# Patient Record
Sex: FEMALE | Race: BLACK OR AFRICAN AMERICAN | NOT HISPANIC OR LATINO | Employment: OTHER | URBAN - METROPOLITAN AREA
[De-identification: names, ages, dates, MRNs, and addresses within clinical notes are randomized per-mention and may not be internally consistent; named-entity substitution may affect disease eponyms.]

---

## 2017-08-07 ENCOUNTER — OFFICE VISIT (OUTPATIENT)
Dept: OCCUPATIONAL MEDICINE | Facility: CLINIC | Age: 57
End: 2017-08-07
Payer: OTHER MISCELLANEOUS

## 2017-08-07 VITALS
WEIGHT: 145 LBS | OXYGEN SATURATION: 97 % | BODY MASS INDEX: 24.75 KG/M2 | RESPIRATION RATE: 16 BRPM | HEART RATE: 94 BPM | HEIGHT: 64 IN | DIASTOLIC BLOOD PRESSURE: 75 MMHG | TEMPERATURE: 99 F | SYSTOLIC BLOOD PRESSURE: 107 MMHG

## 2017-08-07 DIAGNOSIS — S63.501A SPRAIN OF RIGHT WRIST, INITIAL ENCOUNTER: ICD-10-CM

## 2017-08-07 DIAGNOSIS — S43.401A SPRAIN OF RIGHT SHOULDER, UNSPECIFIED SHOULDER SPRAIN TYPE, INITIAL ENCOUNTER: ICD-10-CM
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
## 2017-08-07 PROCEDURE — 99203 OFFICE O/P NEW LOW 30 MIN: CPT | Mod: S$GLB,,, | Performed by: NURSE PRACTITIONER

## 2017-08-07 PROCEDURE — 3008F BODY MASS INDEX DOCD: CPT | Mod: S$GLB,,, | Performed by: NURSE PRACTITIONER

## 2017-08-07 RX ORDER — LISINOPRIL 10 MG/1
10 TABLET ORAL DAILY
COMMUNITY
End: 2020-09-30

## 2017-08-07 RX ORDER — TRAMADOL HYDROCHLORIDE 100 MG/1
100 TABLET, EXTENDED RELEASE ORAL DAILY
COMMUNITY
End: 2020-09-30
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
## 2018-12-12 DIAGNOSIS — M19.012 OSTEOARTHRITIS OF LEFT SHOULDER: Primary | ICD-10-CM

## 2019-01-02 ENCOUNTER — CLINICAL SUPPORT (OUTPATIENT)
Dept: REHABILITATION | Facility: OTHER | Age: 59
End: 2019-01-02
Payer: MEDICAID

## 2019-01-02 DIAGNOSIS — G89.29 CHRONIC LEFT SHOULDER PAIN: ICD-10-CM

## 2019-01-02 DIAGNOSIS — M25.512 CHRONIC LEFT SHOULDER PAIN: ICD-10-CM

## 2019-01-02 DIAGNOSIS — R53.1 DECREASED STRENGTH: ICD-10-CM

## 2019-01-02 DIAGNOSIS — M25.612 DECREASED RANGE OF MOTION OF LEFT SHOULDER: ICD-10-CM

## 2019-01-02 PROCEDURE — 97161 PT EVAL LOW COMPLEX 20 MIN: CPT | Mod: PN

## 2019-01-02 NOTE — PLAN OF CARE
[facility]  Physical Therapy Initial Evaluation    Name: Brandy Alegria  Clinic Number: [id number]    Therapy Diagnosis:   Encounter Diagnoses   Name Primary?    Chronic left shoulder pain     Decreased range of motion of left shoulder     Decreased strength      Physician: Haven Estrada, NP    Physician Orders: PT Eval and Treat   Medical Diagnosis from Referral: primary OA - left shoulder  Evaluation Date: 2019  Authorization Period Expiration: 2019  Plan of Care Expiration: 2019  Visit # / Visits authorized:     Time In: 10:00  Time Out: 11:00  Total Billable Time: 60 minutes    Precautions: Standard, smoker, Hx neck/back pain    Subjective   Date of onset:   History of current condition - BRANDY reports: Insidious onset of L shoulder pain since  without HUMBERTO or trauma at onset. Pt notes gradual worsening with severe pain and difficulty with raising arm, extending, reaching behind the back. Pt was told by the MD that it was arthritis. Pt reports that she must keep the arm close to her side otherwise doing too much or reaching too far will make it pop and hurt.    Finished PT for neck and back 1-2 months ago.  R handed. Kids live with her to help as needed. Needs occasional assistance with HHCs and ADLs, including dressing.      Medical History:   Past Medical History:   Diagnosis Date    Hypertension        Surgical History:   Brandy Alegria  has a past surgical history that includes  section.    Medications:   Brandy TURK has a current medication list which includes the following prescription(s): lisinopril and tramadol.    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[employer])  Occupation: not working  Prior Level of Function: independent with all ADLs,  "HHCs  Current Level of Function: occasionally requires assistance with ADLs, such as dressing, and is UA to perform regular ADLs/HHCs due to increased pain    Pain:  Current 9/10, worst 10/10, best 9/10   Location: left shoulder   Description: Variable - deep inside, dull ache - occasional sharp pain up to neck and down LUE  Aggravating Factors: sleeping on L side, using L arm to reach in all directions  Easing Factors: muscle relaxers, tylenol - change position and keep the arm at the side, heating pad, warm bath    Pts goals: "I don't really want to be here at therapy, but I just want something to help with my pain."    Objective     Posture Alignment: slouched posture with rounded shoulders, poor endurance and requires occasional trunk SB to R with prop up on RUE  DTR's: 1+  Dermatomes: Sensation: Light Touch: Intact    Cervical AROM: WFL all directions, no pain. Max Limited = flex (chin 2" from chest)    Shoulder  Right   Left  Pain/Dysfunction with Movement    AROM PROM MMT AROM PROM MMT    flexion 150 165 3+ 90* 100* 3- *indicates Guarding/pain limits A/PROM. Empty EF with PROM 2* pain     extension 55 NT NT 35 36* NT    abduction 160 170 3+ 60* 70* 3-    Internal rotation T7 NT 4 Mid-glute* 40* 4    ER at 0° abd 76 NT 4 45* 50* 4      Scapular Strength: grossly 3-/5  Elbow ROM: WNL nneka  Elbow Strength: Right Left  -Flexion  4+ 4  -Extension  4+ 4    Flexibility:   - Pectoralis Major/Minor: poor  - Latissimus Dorsi: NT 2* time constraints, assess prn  -Subscapularis: NT 2* time constraints, assess prn    Joint Mobility: 2-/6 inf/AP glides  Special Tests: UA to accurately assess 2* pain/guarding        CMS Impairment/Limitation/Restriction for FOTO Shoulder Survey    Therapist reviewed FOTO scores for Aileen Alegria on 1/2/2019.   FOTO documents entered into [employer] - see Media section.    Limitation Score: 53%  Category: Mobility    Current : CK = at least 40% but < 60% impaired, limited or restricted  Goal: CJ = " at least 20% but < 40% impaired, limited or restricted  Discharge: N/A         TREATMENT   Treatment Time In: 10:30  Treatment Time Out: 11:00  Total Treatment time separate from Evaluation: 30 minutes    BRANDY received therapeutic exercises to develop strength, endurance, ROM, flexibility, posture and core stabilization for 15 minutes including:  Table flexion slides 15x  Table scaption slides 15x  Pendulum hang 2 min x 2#  Pendulums M/L 10x 2#    BRANDY received the following manual therapy techniques: Joint mobilizations and Manual traction were applied to the: L shoulder for 8 minutes    Home Exercises and Patient Education Provided    Education provided:   - - Patient educated regarding surgical procedure, pathogenesis, diagnosis, protocol, prognosis, POC, and HEP. Written Home Exercises Provided with written and verbal instructions for frequency and duration of the following exercises: see media tab in EMR for written directions. Pt educated on HEP and activity modifications to reduce c/o pain and improve overall function.     - Pt was educated in posture and body mechanics.  Encouraged pt to use HEP several times throughout the day to reduce and prevent symptom occurrence or from worsening pain as well as to improve functional mobiltiy and ease with tasks    - Pt also educated on use of modalities prn to reduce c/o pain and dysfunction.     - Pt educated on clinic's cancellation/no-show policy of missing 3 consecutive PT appointments, which will result in an automatic discharge from therapy services 2* to non-compliance, unless otherwise stated.     - Patient demo good understanding of the education provided. Patient demo good return demo of skill of exercises.        Written Home Exercises Provided: yes.  Exercises were reviewed and BRANDY was able to demonstrate them prior to the end of the session.  BRANDY demonstrated good  understanding of the education provided.     See EMR under Media for exercises  provided 1/2/2019.    Assessment   Aileen TURK is a 58 y.o. female referred to outpatient Physical Therapy with a medical diagnosis of primary OA of the left shoulder. Pt presents with marked limitations in joint mobility, active and passive ROM due to pain and guarding, as well as strength, which limits pt with all ADLs. Pt also presents with poor postural awareness and endurance, which continues to facilitate malalignment of the GHJ and facilitate pain and difficulty with functional mobility.    Pt prognosis is Good.   Pt will benefit from skilled outpatient Physical Therapy to address the deficits stated above and in the chart below, provide pt/family education, and to maximize pt's level of independence.     Plan of care discussed with patient: Yes  Pt's spiritual, cultural and educational needs considered and patient is agreeable to the plan of care and goals as stated below:     Anticipated Barriers for therapy: requires transportation, dwelling location ([facility]), financial considerations    Medical Necessity is demonstrated by the following  History  Co-morbidities and personal factors that may impact the plan of care Co-morbidities:   coping style/mechanism, difficulty sleeping, education level, excessive commute time/distance, financial considerations, level of undertstanding of current condition and transportation assistance required    Personal Factors:   education level  coping style  social background  lifestyle  character     moderate   Examination  Body Structures and Functions, activity limitations and participation restrictions that may impact the plan of care Body Regions:   back  upper extremities  trunk    Body Systems:    gross symmetry  ROM  strength  gross coordinated movement  motor control  motor learning  joint mobility, posture    Participation Restrictions:   ADLs, HHCs, dressing/grooming, cooking, sleeping    Activity limitations:   Learning and applying knowledge  no  deficits    General Tasks and Commands  no deficits    Communication  no deficits    Mobility  lifting and carrying objects    Self care  washing oneself (bathing, drying, washing hands)  caring for body parts (brushing teeth, shaving, grooming)  toileting  dressing  eating  drinking  looking after one's health    Domestic Life  shopping  cooking  doing house work (cleaning house, washing dishes, laundry)  assisting others    Interactions/Relationships  no deficits    Life Areas  employment    Community and Social Life  community life  recreation and leisure  [employer] and spirituality         moderate   Clinical Presentation stable and uncomplicated low   Decision Making/ Complexity Score: low     Goals:  Short Term Goals (4 Weeks):   1. Pt to demonstrate improved PROM by 10% to allow pt to perform self care activities with increased ease.  2. Pt to demonstrate improved flexibility by a half grade to allow improved ADLs with increased ease.   3. Pt will report <7/10 pain within the left shoulder for ease with donning/doffing sling.  4. Pt will report being independent with her HEP for maintenance of improvements gained during therapy sessions  5. Pt to demonstrate improved functional ability with FOTO limitation <=47% disability.    Long Term Goals (20 Weeks):   1. Pt to demonstrate improved ROM to WNL, R=L, to allow pt to perform self care with increased ease.  2. Pt to demonstrate improved flexibility by a full grade to allow improved postural alignment with increased ease.  3. Pt will demonstrate >=4+/5 strength within the left shoulder for ease with house hold chores  4. Pt to demonstrate improved functional ability with FOTO limitation <=40% disability.  5. Pt independent with HEP and demonstrates good return technique.      Plan   Plan of care Certification: 1/2/2019 to 04/02/2019.    Outpatient Physical Therapy 2 times weekly for 8 weeks to include the following interventions: Aquatic Therapy, Electrical  Stimulation prn, Iontophoresis (with dexamethasone prn), Manual Therapy, Moist Heat/ Ice, Neuromuscular Re-ed, Patient Education, Self Care, Therapeutic Activites, Therapeutic Exercise and IASTYM, therapeutic taping, dry needling. Progress HEP towards D/C. Recommend F/U with MD if symptoms worsen or do not resolve. Patient may be seen by a PTA for treatment to carry out their plan of care.  Face-to-face conferences will be held.        Hailey Zacarias, PT

## 2019-02-01 ENCOUNTER — CLINICAL SUPPORT (OUTPATIENT)
Dept: REHABILITATION | Facility: HOSPITAL | Age: 59
End: 2019-02-01
Payer: MEDICAID

## 2019-02-01 DIAGNOSIS — G89.29 CHRONIC LEFT SHOULDER PAIN: ICD-10-CM

## 2019-02-01 DIAGNOSIS — R53.1 DECREASED STRENGTH: ICD-10-CM

## 2019-02-01 DIAGNOSIS — M25.612 DECREASED RANGE OF MOTION OF LEFT SHOULDER: ICD-10-CM

## 2019-02-01 DIAGNOSIS — M25.512 CHRONIC LEFT SHOULDER PAIN: ICD-10-CM

## 2019-02-01 PROCEDURE — 97140 MANUAL THERAPY 1/> REGIONS: CPT | Mod: PO

## 2019-02-01 PROCEDURE — 97110 THERAPEUTIC EXERCISES: CPT | Mod: PO

## 2019-02-01 NOTE — PROGRESS NOTES
"  Physical Therapy Daily Treatment Note     Name: Aileen Alegria  Clinic Number: [id number]    Therapy Diagnosis:   Encounter Diagnoses   Name Primary?    Chronic left shoulder pain     Decreased range of motion of left shoulder     Decreased strength      Physician: Haven Estrada NP    Visit Date: 2/1/2019  Physician Orders: PT Eval and Treat   Medical Diagnosis from Referral: primary OA - left shoulder  Evaluation Date: 1/2/2019  Authorization Period Expiration: 02/28/2019  Plan of Care Expiration: 04/02/2019  Visit # / Visits authorized: 2/ 20    Time In: 2:10  Time Out:3:05  Total Billable Time: 40 minutes    Precautions: Standard, smoker, Hx neck/back pain    Subjective     Pt reports: her L shoulder pain is the same since she was last seen in PT 4 weeks ago.  States as long as she keeps her arm at her side, she doesn't have pain. But once she reaches forward or backward, the pain gets severe. C/o difficulty sleeping due to L shoulder pain.  Pain anterior and posteriorly.    Objective     MADELINE received therapeutic exercises to develop strength, endurance, ROM, flexibility and posture for 26 minutes including:    scaption ball rolls x 15  scap squeeze on 1/2 roll 5" hold x 2 minutes  Pulleys flexion/scaption 1 min each  Supine cane flexion 1 x 10  Serratus punches 2 x 10  SL cap squeeze x 2 minutes    MADELINE received the following manual therapy techniques: Joint mobilizations were applied to the: L shoulder for 8 minutes, including:    Gentle PROM all planes L shoulder  Grade II inferior/posterior mobs L GH joint    PROM L shoulder:  Flexion 90 deg  Abduction 90 deg  ER 30 deg  IR 60 deg    Reviewed HEP issued previously, pt states she does them "sometimes". PT educated pt on importance of daily stretching use of ice    Assessment     Patient previously evaluated at different location on 1/2/2019, returns to PT this date as transfer patient.  She presents with moderate functional limitations secondary to " L shoulder pain, weakness, and limited AROM/PROM. Pt demo moderate mm guarding during PROM with PT.  She tolerates AAROM and low level scapular recruitment well this date, denies increased pain throughout session. Pt will benefit from progressive therex initially focused on restoring normal mobility and decreasing pain/inflammation with progression of AAROM, AROM, and RC and periscapular strengthening as tolerated.        MADELINE is progressing well towards her goals.   Pt prognosis is Good.     Pt will continue to benefit from skilled outpatient physical therapy to address the deficits listed in the problem list box on initial evaluation, provide pt/family education and to maximize pt's level of independence in the home and community environment.     Pt's spiritual, cultural and educational needs considered and pt agreeable to plan of care and goals.    Anticipated barriers to physical therapy:     Goals:   Short Term Goals (4 Weeks):   1. Pt to demonstrate improved PROM by 10% to allow pt to perform self care activities with increased ease.  2. Pt to demonstrate improved flexibility by a half grade to allow improved ADLs with increased ease.   3. Pt will report <7/10 pain within the left shoulder for ease with donning/doffing sling.  4. Pt will report being independent with her HEP for maintenance of improvements gained during therapy sessions  5. Pt to demonstrate improved functional ability with FOTO limitation <=47% disability.     Long Term Goals (20 Weeks):   1. Pt to demonstrate improved ROM to WNL, R=L, to allow pt to perform self care with increased ease.  2. Pt to demonstrate improved flexibility by a full grade to allow improved postural alignment with increased ease.  3. Pt will demonstrate >=4+/5 strength within the left shoulder for ease with house hold chores  4. Pt to demonstrate improved functional ability with FOTO limitation <=40% disability.  5. Pt independent with HEP and demonstrates good  return technique    Plan     Plan of care Certification: 1/2/2019 to 04/02/2019.     Outpatient Physical Therapy 2 times weekly for 8 weeks to include the following interventions: Aquatic Therapy, Electrical Stimulation prn, Iontophoresis (with dexamethasone prn), Manual Therapy, Moist Heat/ Ice, Neuromuscular Re-ed, Patient Education, Self Care, Therapeutic Activites, Therapeutic Exercise, therapeutic taping, dry needling. Progress HEP towards D/C. Recommend F/U with MD if symptoms worsen or do not resolve    Liliane Ramirez, PT

## 2019-02-05 ENCOUNTER — CLINICAL SUPPORT (OUTPATIENT)
Dept: REHABILITATION | Facility: HOSPITAL | Age: 59
End: 2019-02-05
Payer: MEDICAID

## 2019-02-05 DIAGNOSIS — M25.512 CHRONIC LEFT SHOULDER PAIN: ICD-10-CM

## 2019-02-05 DIAGNOSIS — R53.1 DECREASED STRENGTH: ICD-10-CM

## 2019-02-05 DIAGNOSIS — G89.29 CHRONIC LEFT SHOULDER PAIN: ICD-10-CM

## 2019-02-05 DIAGNOSIS — M25.612 DECREASED RANGE OF MOTION OF LEFT SHOULDER: ICD-10-CM

## 2019-02-05 PROCEDURE — 97110 THERAPEUTIC EXERCISES: CPT | Mod: PO

## 2019-02-05 PROCEDURE — 97140 MANUAL THERAPY 1/> REGIONS: CPT | Mod: PO

## 2019-02-05 NOTE — PROGRESS NOTES
"  Physical Therapy Daily Treatment Note     Name: Aileen Alegria  Clinic Number: [id number]    Therapy Diagnosis:   Encounter Diagnoses   Name Primary?    Chronic left shoulder pain     Decreased range of motion of left shoulder     Decreased strength      Physician: Haven Estrada NP    Visit Date: 2/5/2019  Physician Orders: PT Eval and Treat   Medical Diagnosis from Referral: primary OA - left shoulder  Evaluation Date: 1/2/2019  Authorization Period Expiration: 02/28/2019  Plan of Care Expiration: 04/02/2019  Visit # / Visits authorized: 3/ 20    Time In: 10:02  Time Out:11:00  Total Billable Time: 40 minutes    Precautions: Standard, smoker, Hx neck/back pain    Subjective     Pt reports: her L arm felt better after last session, but overall "It's horrible".    Objective     MADELINE received therapeutic exercises to develop strength, endurance, ROM, flexibility and posture for 26 minutes including:    Standing ball rolls flexion/scaption 2 min each  scap squeeze on 1/2 roll 5" hold x 2 minutes  Pulleys flexion/scaption 2 min each  Supine cane flexion 2 x 10  Serratus punches 3 x 10  SL ER 3 x 10  Supine cane ER 5 x 30"    MADELINE received the following manual therapy techniques: Joint mobilizations were applied to the: L shoulder for 8 minutes, including:    Gentle PROM all planes L shoulder  Grade II inferior/posterior mobs L GH joint    Reviewed current HEP and educated pt on importance of stretching to end ranges daily    Assessment     Patient reporting poor compliance with HEP, stating she's only been doing the pendulums.  Moderate limitations in capsular mobility which is preventing normal shoulder mechanics.    MADELINE is progressing well towards her goals.   Pt prognosis is Good.     Pt will continue to benefit from skilled outpatient physical therapy to address the deficits listed in the problem list box on initial evaluation, provide pt/family education and to maximize pt's level of independence in " the home and community environment.     Pt's spiritual, cultural and educational needs considered and pt agreeable to plan of care and goals.    Anticipated barriers to physical therapy:     Goals:   Short Term Goals (4 Weeks):   1. Pt to demonstrate improved PROM by 10% to allow pt to perform self care activities with increased ease.  2. Pt to demonstrate improved flexibility by a half grade to allow improved ADLs with increased ease.   3. Pt will report <7/10 pain within the left shoulder for ease with donning/doffing sling.  4. Pt will report being independent with her HEP for maintenance of improvements gained during therapy sessions  5. Pt to demonstrate improved functional ability with FOTO limitation <=47% disability.     Long Term Goals (20 Weeks):   1. Pt to demonstrate improved ROM to WNL, R=L, to allow pt to perform self care with increased ease.  2. Pt to demonstrate improved flexibility by a full grade to allow improved postural alignment with increased ease.  3. Pt will demonstrate >=4+/5 strength within the left shoulder for ease with house hold chores  4. Pt to demonstrate improved functional ability with FOTO limitation <=40% disability.  5. Pt independent with HEP and demonstrates good return technique    Plan     Plan of care Certification: 1/2/2019 to 04/02/2019.     Outpatient Physical Therapy 2 times weekly for 8 weeks to include the following interventions: Aquatic Therapy, Electrical Stimulation prn, Iontophoresis (with dexamethasone prn), Manual Therapy, Moist Heat/ Ice, Neuromuscular Re-ed, Patient Education, Self Care, Therapeutic Activites, Therapeutic Exercise, therapeutic taping, dry needling. Progress HEP towards D/C. Recommend F/U with MD if symptoms worsen or do not resolve    Liliane Ramirez, PT

## 2019-02-07 ENCOUNTER — CLINICAL SUPPORT (OUTPATIENT)
Dept: REHABILITATION | Facility: HOSPITAL | Age: 59
End: 2019-02-07
Payer: MEDICAID

## 2019-02-07 DIAGNOSIS — G89.29 CHRONIC LEFT SHOULDER PAIN: ICD-10-CM

## 2019-02-07 DIAGNOSIS — M25.512 CHRONIC LEFT SHOULDER PAIN: ICD-10-CM

## 2019-02-07 DIAGNOSIS — R53.1 DECREASED STRENGTH: ICD-10-CM

## 2019-02-07 DIAGNOSIS — M25.612 DECREASED RANGE OF MOTION OF LEFT SHOULDER: ICD-10-CM

## 2019-02-07 PROCEDURE — 97110 THERAPEUTIC EXERCISES: CPT | Mod: PO

## 2019-02-07 PROCEDURE — 97140 MANUAL THERAPY 1/> REGIONS: CPT | Mod: PO

## 2019-02-07 NOTE — PROGRESS NOTES
"  Physical Therapy Daily Treatment Note     Name: Aileen Alegria  Clinic Number: [id number]    Therapy Diagnosis:   Encounter Diagnoses   Name Primary?    Chronic left shoulder pain     Decreased range of motion of left shoulder     Decreased strength      Physician: Haven Estrada NP    Visit Date: 2/7/2019  Physician Orders: PT Eval and Treat   Medical Diagnosis from Referral: primary OA - left shoulder  Evaluation Date: 1/2/2019  Authorization Period Expiration: 02/28/2019  Plan of Care Expiration: 04/02/2019  Visit # / Visits authorized: 4/ 20    Time In: 10:02  Time Out:11:00  Total Billable Time: 50 minutes    Precautions: Standard, smoker, Hx neck/back pain    Subjective     Pt reports: having 9/10 pain in her arm all day yesterday, stating she holds her arm for relief.  Patient states overall the pain in her arm in the past week has stayed the same, but states some days the pain feels better, usually after therapy. Continues to have pain with sleep, and cannot tolerate L SL    Objective     AROM scaption upon arrival 110 deg.      MADELINE received therapeutic exercises to develop strength, endurance, ROM, flexibility and posture for 26 minutes including:    Standing ball rolls flexion/scaption 2 min each  scap squeeze on 1/2 roll 5" hold x 2 minutes  Pulleys flexion/scaption 2 min each  Supine cane flexion 2 x 10  Serratus punches 3 x 10  SL ER 3 x 10  Supine cane ER 5 x 30"  Supine pec stretch on 1/2 roll 2m x 2  Prone row 2 x 10  Prone extension 3 x 10    MADELINE received the following manual therapy techniques: Joint mobilizations were applied to the: L shoulder for 8 minutes, including:    Gentle PROM all planes L shoulder  Grade II inferior/posterior mobs L GH joint  Passive pec/biceps stretch    Assessment     Shoulder mobility improving, yet pt denies decrease in pain levels. Tolerates progression of scapular strengthening well this date.    MADELINE is progressing well towards her goals.   Pt " prognosis is Good.     Pt will continue to benefit from skilled outpatient physical therapy to address the deficits listed in the problem list box on initial evaluation, provide pt/family education and to maximize pt's level of independence in the home and community environment.     Pt's spiritual, cultural and educational needs considered and pt agreeable to plan of care and goals.    Anticipated barriers to physical therapy:     Goals:   Short Term Goals (4 Weeks):   1. Pt to demonstrate improved PROM by 10% to allow pt to perform self care activities with increased ease.  2. Pt to demonstrate improved flexibility by a half grade to allow improved ADLs with increased ease.   3. Pt will report <7/10 pain within the left shoulder for ease with donning/doffing sling.  4. Pt will report being independent with her HEP for maintenance of improvements gained during therapy sessions  5. Pt to demonstrate improved functional ability with FOTO limitation <=47% disability.     Long Term Goals (20 Weeks):   1. Pt to demonstrate improved ROM to WNL, R=L, to allow pt to perform self care with increased ease.  2. Pt to demonstrate improved flexibility by a full grade to allow improved postural alignment with increased ease.  3. Pt will demonstrate >=4+/5 strength within the left shoulder for ease with house hold chores  4. Pt to demonstrate improved functional ability with FOTO limitation <=40% disability.  5. Pt independent with HEP and demonstrates good return technique    Plan     Plan of care Certification: 1/2/2019 to 04/02/2019.     Outpatient Physical Therapy 2 times weekly for 8 weeks to include the following interventions: Aquatic Therapy, Electrical Stimulation prn, Iontophoresis (with dexamethasone prn), Manual Therapy, Moist Heat/ Ice, Neuromuscular Re-ed, Patient Education, Self Care, Therapeutic Activites, Therapeutic Exercise, therapeutic taping, dry needling. Progress HEP towards D/C. Recommend F/U with MD if  symptoms worsen or do not resolve    Liliane Ramirez, PT

## 2019-02-18 ENCOUNTER — CLINICAL SUPPORT (OUTPATIENT)
Dept: REHABILITATION | Facility: HOSPITAL | Age: 59
End: 2019-02-18
Payer: MEDICAID

## 2019-02-18 DIAGNOSIS — M25.512 CHRONIC LEFT SHOULDER PAIN: ICD-10-CM

## 2019-02-18 DIAGNOSIS — R53.1 DECREASED STRENGTH: ICD-10-CM

## 2019-02-18 DIAGNOSIS — G89.29 CHRONIC LEFT SHOULDER PAIN: ICD-10-CM

## 2019-02-18 DIAGNOSIS — M25.612 DECREASED RANGE OF MOTION OF LEFT SHOULDER: ICD-10-CM

## 2019-02-18 PROCEDURE — 97110 THERAPEUTIC EXERCISES: CPT | Mod: PO

## 2019-02-18 NOTE — PROGRESS NOTES
"  Physical Therapy Daily Treatment Note     Name: Aileen Alegria  Clinic Number: [id number]    Therapy Diagnosis:   Encounter Diagnoses   Name Primary?    Chronic left shoulder pain     Decreased range of motion of left shoulder     Decreased strength      Physician: Haven Estrada NP    Visit Date: 2/18/2019  Physician Orders: PT Eval and Treat   Medical Diagnosis from Referral: primary OA - left shoulder  Evaluation Date: 1/2/2019  Authorization Period Expiration: 02/28/2019  Plan of Care Expiration: 04/02/2019  Visit # / Visits authorized: 5/ 20    Time In: 8:33  Time Out:9:15  Total Billable Time: 30 minutes    Precautions: Standard, smoker, Hx neck/back pain    Subjective     Pt reports: increased L shoulder pain in the last two weeks, states the pain will now refer across to her R shoulder.  No improvements noted in functional use.  Current pain report: 11/10. States when she tries to reach overhead or pushes through her L arm, she feels stabbing pain "deep in my shoulder".  Pt states she is planning to contact MD to make appt regarding progressively increasing pain.   Objective     AROM L shoulder:  Flexion: 97 deg  Abduction: 90 deg  IR thumb to sacrum (R thumb to T12)  Extension: 35 deg  **reported increased pain at all ranges    PROM:  Flexion 120 deg  Abduction 100 deg  ER 35 deg (at 70 deg abd)  IR 35 deg (at 70 deg abd)  **c/o pain at all end ranges    Palpation: moderate tenderness reported at L UT  Special testing: unable to perform due to mm guarding      MADELINE received therapeutic exercises to develop strength, endurance, ROM, flexibility and posture for 26 minutes including:    Standing ball rolls flexion/scaption 2 min each  scap squeeze on 1/2 roll 5" hold x 2 minutes  Pulleys flexion/scaption 2 min each  Supine cane flexion 2 x 10  Serratus punches 3 x 10  SL ER 3 x 10-NP  Supine cane ER 5 x 30"-NP  Supine pec stretch on towel roll 2m x 2  Prone row 3 x 10  Prone extension 3 x 10    MADELINE " received the following manual therapy techniques: Joint mobilizations were applied to the: L shoulder for 8 minutes, including:    Gentle PROM all planes L shoulder  Grade II inferior/posterior mobs L GH joint  Passive pec/biceps stretch    Assessment     Patient has attended 5 sessions of physical therapy for treatment of L shoulder pain, limited mobility, and weakness . Patient is currently making very minimal progress regarding strength and mobility and is reporting progressively worsening L UE pain.  Difficult to perform special testing due to mm guarding, however signs and symptoms are consistent with rotator cuff and possible labral involvement and pt would benefit from further diagnostic testing at this time.  Patient will benefit from continued skilled PT to progress further towards long term goals, however if no significant improvement noted in the next 2 weeks, will plan to discharge and refer patient back to MD.    MADELINE is progressing well towards her goals.   Pt prognosis is Good.     Pt will continue to benefit from skilled outpatient physical therapy to address the deficits listed in the problem list box on initial evaluation, provide pt/family education and to maximize pt's level of independence in the home and community environment.     Pt's spiritual, cultural and educational needs considered and pt agreeable to plan of care and goals.    Anticipated barriers to physical therapy:     Goals:   Short Term Goals (4 Weeks):   1. Pt to demonstrate improved PROM by 10% to allow pt to perform self care activities with increased ease.  2. Pt to demonstrate improved flexibility by a half grade to allow improved ADLs with increased ease.   3. Pt will report <7/10 pain within the left shoulder for ease with donning/doffing sling.  4. Pt will report being independent with her HEP for maintenance of improvements gained during therapy sessions  5. Pt to demonstrate improved functional ability with FOTO  limitation <=47% disability.     Long Term Goals (20 Weeks):   1. Pt to demonstrate improved ROM to WNL, R=L, to allow pt to perform self care with increased ease.  2. Pt to demonstrate improved flexibility by a full grade to allow improved postural alignment with increased ease.  3. Pt will demonstrate >=4+/5 strength within the left shoulder for ease with house hold chores  4. Pt to demonstrate improved functional ability with FOTO limitation <=40% disability.  5. Pt independent with HEP and demonstrates good return technique    Plan     Plan of care Certification: 1/2/2019 to 04/02/2019.     Outpatient Physical Therapy 2 times weekly for 8 weeks to include the following interventions: Aquatic Therapy, Electrical Stimulation prn, Iontophoresis (with dexamethasone prn), Manual Therapy, Moist Heat/ Ice, Neuromuscular Re-ed, Patient Education, Self Care, Therapeutic Activites, Therapeutic Exercise, therapeutic taping, dry needling. Progress HEP towards D/C. Recommend F/U with MD if symptoms worsen or do not resolve.   Patient to call referring to make appt due to progressively worsening pain.    Liliane Ramirez, PT

## 2019-02-20 ENCOUNTER — CLINICAL SUPPORT (OUTPATIENT)
Dept: REHABILITATION | Facility: HOSPITAL | Age: 59
End: 2019-02-20
Payer: MEDICAID

## 2019-02-20 DIAGNOSIS — M25.512 CHRONIC LEFT SHOULDER PAIN: ICD-10-CM

## 2019-02-20 DIAGNOSIS — M25.612 DECREASED RANGE OF MOTION OF LEFT SHOULDER: ICD-10-CM

## 2019-02-20 DIAGNOSIS — G89.29 CHRONIC LEFT SHOULDER PAIN: ICD-10-CM

## 2019-02-20 DIAGNOSIS — R53.1 DECREASED STRENGTH: ICD-10-CM

## 2019-02-20 PROCEDURE — 97110 THERAPEUTIC EXERCISES: CPT | Mod: PO

## 2019-02-20 PROCEDURE — 97140 MANUAL THERAPY 1/> REGIONS: CPT | Mod: PO

## 2019-02-20 NOTE — PROGRESS NOTES
"  Physical Therapy Daily Treatment Note     Name: Aileen Alegria  Clinic Number: [id number]    Therapy Diagnosis:   Encounter Diagnoses   Name Primary?    Chronic left shoulder pain     Decreased range of motion of left shoulder     Decreased strength      Physician: Haven Estrada NP    Visit Date: 2/20/2019  Physician Orders: PT Eval and Treat   Medical Diagnosis from Referral: primary OA - left shoulder  Evaluation Date: 1/2/2019  Authorization Period Expiration: 02/28/2019  Plan of Care Expiration: 04/02/2019  Visit # / Visits authorized: 6/ 20    Time In: 8:30  Time Out:9:20  Total Billable Time: 50 minutes    Precautions: Standard, smoker, Hx neck/back pain    Subjective     Pt reports: increased L shoulder pain in the last two weeks, states the pain will now refer across to her R shoulder.  No improvements noted in functional use.  Current pain report: 11/10. States when she tries to reach overhead or pushes through her L arm, she feels stabbing pain "deep in my shoulder".  Patient to f/u with referring MD this date.   Objective     AROM L shoulder:  Flexion: 97 deg  Abduction: 90 deg  IR thumb to sacrum (R thumb to T12)  Extension: 35 deg  **reported increased pain at all ranges    PROM:  Flexion 120 deg  Abduction 100 deg  ER 35 deg (at 70 deg abd)  IR 35 deg (at 70 deg abd)  **c/o pain at all end ranges    Palpation: moderate tenderness reported at L UT  Special testing: unable to perform due to mm guarding      MADELINE received therapeutic exercises to develop strength, endurance, ROM, flexibility and posture for 42 minutes including:    Standing ball rolls flexion/scaption 2 min each  scap squeeze on 1/2 roll 5" hold x 2 minutes  Pulleys flexion/scaption 2 min each  Supine cane flexion 2 x 10  Serratus punches 3 x 10  SL ER 3 x 10  Supine cane ER 5 x 30"-NP  Supine pec stretch on towel roll 2m x 2  Prone row 3 x 10  Prone extension 3 x 10    MADELINE received the following manual therapy techniques: " Joint mobilizations were applied to the: L shoulder for 8 minutes, including:    Gentle PROM all planes L shoulder  Grade II inferior/posterior mobs L GH joint  Passive pec/biceps stretch    Assessment     Patient has attended 6 sessions of physical therapy for treatment of L shoulder pain, limited mobility, and weakness . Patient is currently making very minimal progress regarding strength and mobility and is reporting progressively worsening L UE pain.  Difficult to perform special testing due to mm guarding, however signs and symptoms are consistent with rotator cuff and possible labral involvement and pt would benefit from further diagnostic testing at this time.      MADELINE is progressing well towards her goals.   Pt prognosis is Good.     Pt will continue to benefit from skilled outpatient physical therapy to address the deficits listed in the problem list box on initial evaluation, provide pt/family education and to maximize pt's level of independence in the home and community environment.     Pt's spiritual, cultural and educational needs considered and pt agreeable to plan of care and goals.    Anticipated barriers to physical therapy:     Goals:   Short Term Goals (4 Weeks):   1. Pt to demonstrate improved PROM by 10% to allow pt to perform self care activities with increased ease. MET  2. Pt to demonstrate improved flexibility by a half grade to allow improved ADLs with increased ease.  IN PROGRESS  3. Pt will report <7/10 pain within the left shoulder IN PROGRESS  4. Pt will report being independent with her HEP for maintenance of improvements gained during therapy sessions MET  5. Pt to demonstrate improved functional ability with FOTO limitation <=47% disability.     Long Term Goals (16 Weeks):   1. Pt to demonstrate improved ROM to WNL, R=L, to allow pt to perform self care with increased ease.  2. Pt to demonstrate improved flexibility by a full grade to allow improved postural alignment with  increased ease.  3. Pt will demonstrate >=4+/5 strength within the left shoulder for ease with house hold chores  4. Pt to demonstrate improved functional ability with FOTO limitation <=40% disability.  5. Pt independent with HEP and demonstrates good return technique    Plan     Plan of care Certification: 1/2/2019 to 04/02/2019.     Outpatient Physical Therapy 2 times weekly for 8 weeks to include the following interventions: Aquatic Therapy, Electrical Stimulation prn, Iontophoresis (with dexamethasone prn), Manual Therapy, Moist Heat/ Ice, Neuromuscular Re-ed, Patient Education, Self Care, Therapeutic Activites, Therapeutic Exercise, therapeutic taping, dry needling. Progress HEP towards D/C. Recommend F/U with MD if symptoms worsen or do not resolve.   Patient to f/u with referring MD this date.    Liliane Ramirez, PT

## 2019-03-18 ENCOUNTER — CLINICAL SUPPORT (OUTPATIENT)
Dept: REHABILITATION | Facility: HOSPITAL | Age: 59
End: 2019-03-18
Payer: MEDICAID

## 2019-03-18 DIAGNOSIS — G89.29 CHRONIC LEFT SHOULDER PAIN: ICD-10-CM

## 2019-03-18 DIAGNOSIS — M25.612 DECREASED RANGE OF MOTION OF LEFT SHOULDER: ICD-10-CM

## 2019-03-18 DIAGNOSIS — R53.1 DECREASED STRENGTH: ICD-10-CM

## 2019-03-18 DIAGNOSIS — M25.512 CHRONIC LEFT SHOULDER PAIN: ICD-10-CM

## 2019-03-18 PROCEDURE — 97110 THERAPEUTIC EXERCISES: CPT | Mod: PO

## 2019-03-18 NOTE — PROGRESS NOTES
"  Physical Therapy Daily Treatment Note     Name: Aileen Alegria  Clinic Number: [id number]    Therapy Diagnosis:   Encounter Diagnoses   Name Primary?    Chronic left shoulder pain     Decreased range of motion of left shoulder     Decreased strength      Physician: Haven Estrada NP    Visit Date: 3/18/2019  Physician Orders: PT Eval and Treat   Medical Diagnosis from Referral: primary OA - left shoulder  Evaluation Date: 1/2/2019  Authorization Period Expiration: 02/28/2019  Plan of Care Expiration: 04/02/2019  Visit # / Visits authorized: 7/ 20    Time In: 12:37  Time Out:1:22  Total Billable Time: 30 minutes    Precautions: Standard, smoker, Hx neck/back pain    Subjective     Pt reports: she rec'd cortisone injection in L shoulder and since then the pain has been better and she can move it easier.  Reports 7/10 L shoulder pain upon arrival.  Objective       MADELINE received therapeutic exercises to develop strength, endurance, ROM, flexibility and posture for 42 minutes including:    Standing ball rolls flexion/scaption 2 min each  scap squeeze on 1/2 roll 5" hold x 2 minutes  Pulleys flexion/scaption 2 min each  Supine cane flexion 2 x 10 with 1# dowel  Serratus punches 3 x 10  SL ER 3 x 10  Supine cane ER 5 x 30"-NP  Supine pec stretch on towel roll 2m x 2  Prone row 3 x 10 1#  Prone extension 3 x 10 1#    MADELINE received the following manual therapy techniques: Joint mobilizations were applied to the: L shoulder for 8 minutes, including:  Not performed this date:  Gentle PROM all planes L shoulder  Grade II inferior/posterior mobs L GH joint  Passive pec/biceps stretch    Assessment     Patient returns to PT this date after receiving cortisone injection reporting decreased pain and improved mobility.  She continues to demo weakness in RC and periscapular mm's and will benefit from continued skilled PT to restore functional strength and mobility.     MADELINE is progressing well towards her goals.   Pt " prognosis is Good.     Pt will continue to benefit from skilled outpatient physical therapy to address the deficits listed in the problem list box on initial evaluation, provide pt/family education and to maximize pt's level of independence in the home and community environment.     Pt's spiritual, cultural and educational needs considered and pt agreeable to plan of care and goals.    Anticipated barriers to physical therapy:     Goals:   Short Term Goals (4 Weeks):   1. Pt to demonstrate improved PROM by 10% to allow pt to perform self care activities with increased ease. MET  2. Pt to demonstrate improved flexibility by a half grade to allow improved ADLs with increased ease.  IN PROGRESS  3. Pt will report <7/10 pain within the left shoulder IN PROGRESS  4. Pt will report being independent with her HEP for maintenance of improvements gained during therapy sessions MET  5. Pt to demonstrate improved functional ability with FOTO limitation <=47% disability.     Long Term Goals (16 Weeks):   1. Pt to demonstrate improved ROM to WNL, R=L, to allow pt to perform self care with increased ease.  2. Pt to demonstrate improved flexibility by a full grade to allow improved postural alignment with increased ease.  3. Pt will demonstrate >=4+/5 strength within the left shoulder for ease with house hold chores  4. Pt to demonstrate improved functional ability with FOTO limitation <=40% disability.  5. Pt independent with HEP and demonstrates good return technique    Plan     Plan of care Certification: 1/2/2019 to 04/02/2019.     Outpatient Physical Therapy 2 times weekly for 8 weeks to include the following interventions: Aquatic Therapy, Electrical Stimulation prn, Iontophoresis (with dexamethasone prn), Manual Therapy, Moist Heat/ Ice, Neuromuscular Re-ed, Patient Education, Self Care, Therapeutic Activites, Therapeutic Exercise, therapeutic taping, dry needling. Progress HEP towards D/C. Recommend F/U with MD if symptoms  worsen or do not resolve.   Patient to f/u with referring MD this date.    Liliane Ramirez, PT

## 2019-03-25 ENCOUNTER — CLINICAL SUPPORT (OUTPATIENT)
Dept: REHABILITATION | Facility: HOSPITAL | Age: 59
End: 2019-03-25
Payer: MEDICAID

## 2019-03-25 DIAGNOSIS — M25.612 DECREASED RANGE OF MOTION OF LEFT SHOULDER: ICD-10-CM

## 2019-03-25 DIAGNOSIS — R53.1 DECREASED STRENGTH: ICD-10-CM

## 2019-03-25 DIAGNOSIS — M25.512 CHRONIC LEFT SHOULDER PAIN: ICD-10-CM

## 2019-03-25 DIAGNOSIS — G89.29 CHRONIC LEFT SHOULDER PAIN: ICD-10-CM

## 2019-03-25 PROCEDURE — 97110 THERAPEUTIC EXERCISES: CPT | Mod: PO

## 2019-03-25 PROCEDURE — 97140 MANUAL THERAPY 1/> REGIONS: CPT | Mod: PO

## 2019-03-25 NOTE — PROGRESS NOTES
Physical Therapy Daily Treatment Note     Name: Aileen Alegria  Clinic Number: [id number]    Therapy Diagnosis:   Encounter Diagnoses   Name Primary?    Chronic left shoulder pain     Decreased range of motion of left shoulder     Decreased strength      Physician: Haven Estrada NP    Visit Date: 3/25/2019  Physician Orders: PT Eval and Treat   Medical Diagnosis from Referral: primary OA - left shoulder  Evaluation Date: 1/2/2019  Authorization Period Expiration: 02/28/2019  Plan of Care Expiration: 04/02/2019  Visit # / Visits authorized: 8/ 20    Time In: 9:39  Time Out:10:25  Total Billable Time: 45 minutes    Precautions: Standard, smoker, Hx neck/back pain    Subjective     Pt reports: she still has difficulty reaching up behind her back.    Objective       MADELINE received therapeutic exercises to develop strength, endurance, ROM, flexibility and posture for 38 minutes including:    Standing wall wipes B flexion 2 x 10  Pulleys flexion/scaption 2 min each  Supine cane flexion 2 x 10 with 2# dowel  Serratus punches 3 x 12 2#  SL ER 3 x 10 1#  supine pec stretch on towel roll 2m   Prone row 3 x 10 2#  Prone extension 3 x 12 1#  B shoulder extension BTB 3 x 10  B tricep pulldowns BTB 3 x 10    MADELINE received the following manual therapy techniques: Joint mobilizations were applied to the: L shoulder for 8 minutes, including:    Gentle PROM all planes L shoulder  Grade II inferior/posterior mobs L GH joint  Passive pec/biceps stretch    Assessment     Good tolerance PRE's. Continues with painful/tight end feel all end ranges of PROM except ER.    MADELINE is progressing well towards her goals.   Pt prognosis is Good.     Pt will continue to benefit from skilled outpatient physical therapy to address the deficits listed in the problem list box on initial evaluation, provide pt/family education and to maximize pt's level of independence in the home and community environment.     Pt's spiritual, cultural and  educational needs considered and pt agreeable to plan of care and goals.    Anticipated barriers to physical therapy:     Goals:   Short Term Goals (4 Weeks):   1. Pt to demonstrate improved PROM by 10% to allow pt to perform self care activities with increased ease. MET  2. Pt to demonstrate improved flexibility by a half grade to allow improved ADLs with increased ease.  IN PROGRESS  3. Pt will report <7/10 pain within the left shoulder IN PROGRESS  4. Pt will report being independent with her HEP for maintenance of improvements gained during therapy sessions MET  5. Pt to demonstrate improved functional ability with FOTO limitation <=47% disability.     Long Term Goals (16 Weeks):   1. Pt to demonstrate improved ROM to WNL, R=L, to allow pt to perform self care with increased ease.  2. Pt to demonstrate improved flexibility by a full grade to allow improved postural alignment with increased ease.  3. Pt will demonstrate >=4+/5 strength within the left shoulder for ease with house hold chores  4. Pt to demonstrate improved functional ability with FOTO limitation <=40% disability.  5. Pt independent with HEP and demonstrates good return technique    Plan     Plan of care Certification: 1/2/2019 to 04/02/2019.     Outpatient Physical Therapy 2 times weekly for 8 weeks to include the following interventions: Aquatic Therapy, Electrical Stimulation prn, Iontophoresis (with dexamethasone prn), Manual Therapy, Moist Heat/ Ice, Neuromuscular Re-ed, Patient Education, Self Care, Therapeutic Activites, Therapeutic Exercise, therapeutic taping, dry needling. Progress HEP towards D/C. Recommend F/U with MD if symptoms worsen or do not resolve.   Patient to f/u with referring MD this date.    Liliane Ramirez, PT

## 2019-03-28 ENCOUNTER — CLINICAL SUPPORT (OUTPATIENT)
Dept: REHABILITATION | Facility: HOSPITAL | Age: 59
End: 2019-03-28
Payer: MEDICAID

## 2019-03-28 DIAGNOSIS — G89.29 CHRONIC LEFT SHOULDER PAIN: ICD-10-CM

## 2019-03-28 DIAGNOSIS — M25.612 DECREASED RANGE OF MOTION OF LEFT SHOULDER: ICD-10-CM

## 2019-03-28 DIAGNOSIS — R53.1 DECREASED STRENGTH: ICD-10-CM

## 2019-03-28 DIAGNOSIS — M25.512 CHRONIC LEFT SHOULDER PAIN: ICD-10-CM

## 2019-03-28 PROCEDURE — 97110 THERAPEUTIC EXERCISES: CPT | Mod: PO

## 2019-03-28 PROCEDURE — 97140 MANUAL THERAPY 1/> REGIONS: CPT | Mod: PO

## 2019-03-29 NOTE — PROGRESS NOTES
Physical Therapy Daily Treatment Note     Name: Aileen Alegria  Clinic Number: [id number]    Therapy Diagnosis:   Encounter Diagnoses   Name Primary?    Chronic left shoulder pain     Decreased range of motion of left shoulder     Decreased strength      Physician: Haven Estrada NP    Visit Date: 3/28/2019  Physician Orders: PT Eval and Treat   Medical Diagnosis from Referral: primary OA - left shoulder  Evaluation Date: 1/2/2019  Authorization Period Expiration: 02/28/2019  Plan of Care Expiration: 04/13/2019  Visit # / Visits authorized: 9/ 20    Time In: 10:00  Time Out:10:58  Total Billable Time: 30 minutes    Precautions: Standard, smoker, Hx neck/back pain    Subjective     Pt reports: she's been able to move her arm more now with less pain. Denies increased pain after last session.    Objective       MADELINE received therapeutic exercises to develop strength, endurance, ROM, flexibility and posture for 38 minutes including:    Standing wall wipes B flexion 2 x 10  Pulleys flexion/scaption 2 min each  Supine cane flexion 2 x 10 with 2# dowel  Serratus punches 3 x 12 2#  SL ER 3 x 10 1#  supine pec stretch on 1/2 roll 2m   Prone row 3 x 10 2#  Prone extension 3 x 12 1#  B shoulder extension BTB 3 x 10  B tricep pulldowns BTB 3 x 10    MADELINE received the following manual therapy techniques: Joint mobilizations were applied to the: L shoulder for 11 minutes, including:    Gentle PROM all planes L shoulder  Grade II inferior/posterior mobs L GH joint  Passive pec/biceps stretch    Assessment     Continues to progress well towards LTGs with increased strength and mobility and pain c/o steadily improving.    MADELINE is progressing well towards her goals.   Pt prognosis is Good.     Pt will continue to benefit from skilled outpatient physical therapy to address the deficits listed in the problem list box on initial evaluation, provide pt/family education and to maximize pt's level of independence in the home  and community environment.     Pt's spiritual, cultural and educational needs considered and pt agreeable to plan of care and goals.    Anticipated barriers to physical therapy:     Goals:   Short Term Goals (4 Weeks):   1. Pt to demonstrate improved PROM by 10% to allow pt to perform self care activities with increased ease. MET  2. Pt to demonstrate improved flexibility by a half grade to allow improved ADLs with increased ease.  IN PROGRESS  3. Pt will report <7/10 pain within the left shoulder IN PROGRESS  4. Pt will report being independent with her HEP for maintenance of improvements gained during therapy sessions MET  5. Pt to demonstrate improved functional ability with FOTO limitation <=47% disability.     Long Term Goals (16 Weeks):   1. Pt to demonstrate improved ROM to WNL, R=L, to allow pt to perform self care with increased ease.  2. Pt to demonstrate improved flexibility by a full grade to allow improved postural alignment with increased ease.  3. Pt will demonstrate >=4+/5 strength within the left shoulder for ease with house hold chores  4. Pt to demonstrate improved functional ability with FOTO limitation <=40% disability.  5. Pt independent with HEP and demonstrates good return technique    Plan     Plan of care Certification:      Outpatient Physical Therapy 2 times weekly for 8 weeks to include the following interventions: Aquatic Therapy, Electrical Stimulation prn, Iontophoresis (with dexamethasone prn), Manual Therapy, Moist Heat/ Ice, Neuromuscular Re-ed, Patient Education, Self Care, Therapeutic Activites, Therapeutic Exercise, therapeutic taping, dry needling. Progress HEP towards D/C. Recommend F/U with MD if symptoms worsen or do not resolve.       Liliane Ramirez, PT

## 2019-04-02 ENCOUNTER — CLINICAL SUPPORT (OUTPATIENT)
Dept: REHABILITATION | Facility: HOSPITAL | Age: 59
End: 2019-04-02
Payer: MEDICAID

## 2019-04-02 DIAGNOSIS — M25.512 CHRONIC LEFT SHOULDER PAIN: ICD-10-CM

## 2019-04-02 DIAGNOSIS — R53.1 DECREASED STRENGTH: ICD-10-CM

## 2019-04-02 DIAGNOSIS — M25.612 DECREASED RANGE OF MOTION OF LEFT SHOULDER: ICD-10-CM

## 2019-04-02 DIAGNOSIS — G89.29 CHRONIC LEFT SHOULDER PAIN: ICD-10-CM

## 2019-04-02 PROCEDURE — 97140 MANUAL THERAPY 1/> REGIONS: CPT | Mod: PO

## 2019-04-02 PROCEDURE — 97110 THERAPEUTIC EXERCISES: CPT | Mod: PO

## 2019-04-02 NOTE — PROGRESS NOTES
Physical Therapy Daily Treatment Note     Name: Aileen Alegria  Clinic Number: [id number]    Therapy Diagnosis:   No diagnosis found.  Physician: Haven Estrada NP    Visit Date: 4/2/2019  Physician Orders: PT Eval and Treat   Medical Diagnosis from Referral: primary OA - left shoulder  Evaluation Date: 1/2/2019  Authorization Period Expiration: 02/28/2019  Plan of Care Expiration: 04/13/2019  Visit # / Visits authorized: 10/ 20    Time In: 1:00  Time Out:1:30  Total Billable Time: 30 minutes  **pt reporting time constraints this date    Precautions: Standard, smoker, Hx neck/back pain    Subjective     Pt reports: continued improvement.     Objective       AROM:  Flexion 144 deg  abd 125 deg  IR thumb to L3 (R thumb to T9) deg    PROM:  Flexion 155  ER 70 (at 70 deg abd)  scaption 165  IR 70 deg (at 70 deg abd)    MADELINE received therapeutic exercises to develop strength, endurance, ROM, flexibility and posture for 38 minutes including:  Bolded ex's performed this date:    Standing wall wipes B flexion 2 x 10  Pulleys flexion/scaption 2 min each  Supine cane flexion 2 x 10 with 2# dowel  Serratus punches 3 x 12 2#  SL ER 3 x 10 1#  supine pec stretch on 1/2 roll 2m   Prone row 3 x 10 2#  Prone extension 3 x 12 1#  B shoulder extension BTB 3 x 10  B tricep pulldowns BTB 3 x 10  Prone hor abd 3 x 12      MADELINE received the following manual therapy techniques: Joint mobilizations were applied to the: L shoulder for 11 minutes, including:    Gentle PROM all planes L shoulder  Grade II inferior/posterior mobs L GH joint  Passive pec/biceps stretch    Assessment     AROM against gravity remains limited primarily due to pain in upper arm at all end ranges, passive mobility near WNL all planes.    MADELINE is progressing well towards her goals.   Pt prognosis is Good.     Pt will continue to benefit from skilled outpatient physical therapy to address the deficits listed in the problem list box on initial evaluation,  provide pt/family education and to maximize pt's level of independence in the home and community environment.     Pt's spiritual, cultural and educational needs considered and pt agreeable to plan of care and goals.    Anticipated barriers to physical therapy:     Goals:   Short Term Goals (4 Weeks):   1. Pt to demonstrate improved PROM by 10% to allow pt to perform self care activities with increased ease. MET  2. Pt to demonstrate improved flexibility by a half grade to allow improved ADLs with increased ease.  IN PROGRESS  3. Pt will report <7/10 pain within the left shoulder IN PROGRESS  4. Pt will report being independent with her HEP for maintenance of improvements gained during therapy sessions MET  5. Pt to demonstrate improved functional ability with FOTO limitation <=47% disability.     Long Term Goals (16 Weeks):   1. Pt to demonstrate improved ROM to WNL, R=L, to allow pt to perform self care with increased ease.  2. Pt to demonstrate improved flexibility by a full grade to allow improved postural alignment with increased ease.  3. Pt will demonstrate >=4+/5 strength within the left shoulder for ease with house hold chores  4. Pt to demonstrate improved functional ability with FOTO limitation <=40% disability.  5. Pt independent with HEP and demonstrates good return technique    Plan     Plan of care Certification:      Outpatient Physical Therapy 2 times weekly for 8 weeks to include the following interventions: Aquatic Therapy, Electrical Stimulation prn, Iontophoresis (with dexamethasone prn), Manual Therapy, Moist Heat/ Ice, Neuromuscular Re-ed, Patient Education, Self Care, Therapeutic Activites, Therapeutic Exercise, therapeutic taping, dry needling. Progress HEP towards D/C. Recommend F/U with MD if symptoms worsen or do not resolve.       Liliane Ramirez, PT

## 2019-04-04 ENCOUNTER — CLINICAL SUPPORT (OUTPATIENT)
Dept: REHABILITATION | Facility: HOSPITAL | Age: 59
End: 2019-04-04
Payer: MEDICAID

## 2019-04-04 DIAGNOSIS — G89.29 CHRONIC LEFT SHOULDER PAIN: ICD-10-CM

## 2019-04-04 DIAGNOSIS — M25.512 CHRONIC LEFT SHOULDER PAIN: ICD-10-CM

## 2019-04-04 DIAGNOSIS — M25.612 DECREASED RANGE OF MOTION OF LEFT SHOULDER: ICD-10-CM

## 2019-04-04 DIAGNOSIS — R53.1 DECREASED STRENGTH: ICD-10-CM

## 2019-04-04 PROCEDURE — 97140 MANUAL THERAPY 1/> REGIONS: CPT | Mod: PO

## 2019-04-04 PROCEDURE — 97110 THERAPEUTIC EXERCISES: CPT | Mod: PO

## 2019-04-08 ENCOUNTER — CLINICAL SUPPORT (OUTPATIENT)
Dept: REHABILITATION | Facility: HOSPITAL | Age: 59
End: 2019-04-08
Payer: MEDICAID

## 2019-04-08 DIAGNOSIS — R53.1 DECREASED STRENGTH: ICD-10-CM

## 2019-04-08 DIAGNOSIS — M25.612 DECREASED RANGE OF MOTION OF LEFT SHOULDER: ICD-10-CM

## 2019-04-08 DIAGNOSIS — M25.512 CHRONIC LEFT SHOULDER PAIN: ICD-10-CM

## 2019-04-08 DIAGNOSIS — G89.29 CHRONIC LEFT SHOULDER PAIN: ICD-10-CM

## 2019-04-08 PROCEDURE — 97110 THERAPEUTIC EXERCISES: CPT | Mod: PO

## 2019-04-08 PROCEDURE — 97140 MANUAL THERAPY 1/> REGIONS: CPT | Mod: PO

## 2019-04-09 NOTE — PROGRESS NOTES
"  Physical Therapy Daily Treatment Note     Name: Aileen Alegria  Clinic Number: [id number]    Therapy Diagnosis:   Encounter Diagnoses   Name Primary?    Chronic left shoulder pain     Decreased range of motion of left shoulder     Decreased strength      Physician: Haven Estrada NP    Visit Date: 4/8/2019  Physician Orders: PT Eval and Treat   Medical Diagnosis from Referral: primary OA - left shoulder  Evaluation Date: 1/2/2019  Authorization Period Expiration: 02/28/2019  Plan of Care Expiration: 04/13/2019  Visit # / Visits authorized: 11/ 20    Time In: 10:30  Time Out:11:14  Total Billable Time: 24 minutes    Precautions: Standard, smoker, Hx neck/back pain    Subjective     Pt reports: she woke up over the weekend with pain in her L csp that was radiating into her L arm, went to ER, was given injections and oral meds. States she continues with L csp pain and is having difficulty turning her head.    Objective     Enters clinic with csp resting in slight R SB and moderate flexion with increased tone noted B UT.    Began with MH to L csp/scapula x 10 minutes f/b manual tx and then therex. Held several UE ex's to minimize strain to cervical spine.    MADELINE received therapeutic exercises to develop strength, endurance, ROM, flexibility and posture for 8 minutes including:  Bolded ex's performed this date:    Standing wall wipes B flexion 2 x 10  Pulleys flexion/scaption 2 min each  Supine cane flexion 2 x 10   scap squeezes 2 minutes x 3"  Supine csp rotation  Serratus punches 3 x 12 2#  SL ER 3 x 10 1#  supine pec stretch on 1/2 roll 2m   Prone row 3 x 10 2#  Prone extension 3 x 12 1#  B shoulder extension BTB 3 x 10  B tricep pulldowns BTB 3 x 10  Prone hor abd 3 x 12      MADELINE received the following manual therapy techniques: Joint mobilizations were applied to the: L shoulder for 16 minutes, including:  STM L UT/levator scapula/csp paraspinals  Grade II upglides B mid to lower csp   Grade II manual " csp traction  Gentle PROM all planes L shoulder  Grade II inferior/posterior mobs L GH joint  Passive pec/biceps stretch    Assessment     Pt demo improved cervical/thoracic posture at end of session, no significant restrictions noted in csp. Treatment modified to minimize strain to csp, will plan to resume UE therex next session with progression as tolerated.       MADELINE is progressing well towards her goals.   Pt prognosis is Good.     Pt will continue to benefit from skilled outpatient physical therapy to address the deficits listed in the problem list box on initial evaluation, provide pt/family education and to maximize pt's level of independence in the home and community environment.     Pt's spiritual, cultural and educational needs considered and pt agreeable to plan of care and goals.    Anticipated barriers to physical therapy:     Goals:   Short Term Goals (4 Weeks):   1. Pt to demonstrate improved PROM by 10% to allow pt to perform self care activities with increased ease. MET  2. Pt to demonstrate improved flexibility by a half grade to allow improved ADLs with increased ease.  IN PROGRESS  3. Pt will report <7/10 pain within the left shoulder IN PROGRESS  4. Pt will report being independent with her HEP for maintenance of improvements gained during therapy sessions MET  5. Pt to demonstrate improved functional ability with FOTO limitation <=47% disability.     Long Term Goals (16 Weeks):   1. Pt to demonstrate improved ROM to WNL, R=L, to allow pt to perform self care with increased ease.  2. Pt to demonstrate improved flexibility by a full grade to allow improved postural alignment with increased ease.  3. Pt will demonstrate >=4+/5 strength within the left shoulder for ease with house hold chores  4. Pt to demonstrate improved functional ability with FOTO limitation <=40% disability.  5. Pt independent with HEP and demonstrates good return technique    Plan     Plan of care Certification:       Outpatient Physical Therapy 2 times weekly for 8 weeks to include the following interventions: Aquatic Therapy, Electrical Stimulation prn, Iontophoresis (with dexamethasone prn), Manual Therapy, Moist Heat/ Ice, Neuromuscular Re-ed, Patient Education, Self Care, Therapeutic Activites, Therapeutic Exercise, therapeutic taping, dry needling. Progress HEP towards D/C. Recommend F/U with MD if symptoms worsen or do not resolve.       Liliane Ramirez, PT

## 2019-04-11 ENCOUNTER — CLINICAL SUPPORT (OUTPATIENT)
Dept: REHABILITATION | Facility: HOSPITAL | Age: 59
End: 2019-04-11
Payer: MEDICAID

## 2019-04-11 DIAGNOSIS — M25.512 CHRONIC LEFT SHOULDER PAIN: ICD-10-CM

## 2019-04-11 DIAGNOSIS — G89.29 CHRONIC LEFT SHOULDER PAIN: ICD-10-CM

## 2019-04-11 DIAGNOSIS — R53.1 DECREASED STRENGTH: ICD-10-CM

## 2019-04-11 DIAGNOSIS — M25.612 DECREASED RANGE OF MOTION OF LEFT SHOULDER: ICD-10-CM

## 2019-04-11 PROCEDURE — 97140 MANUAL THERAPY 1/> REGIONS: CPT | Mod: PO

## 2019-04-11 NOTE — PROGRESS NOTES
"  Physical Therapy Daily Treatment Note     Name: Aileen Alegria  Clinic Number: [id number]    Therapy Diagnosis:   Encounter Diagnoses   Name Primary?    Chronic left shoulder pain     Decreased range of motion of left shoulder     Decreased strength      Physician: Haven Estrada NP    Visit Date: 4/11/2019  Physician Orders: PT Eval and Treat   Medical Diagnosis from Referral: primary OA - left shoulder  Evaluation Date: 1/2/2019  Authorization Period Expiration: 02/28/2019  Plan of Care Expiration: 04/13/2019  Visit # / Visits authorized: 12/ 20    Time In: 9:30  Time Out: 10:00  Total Billable Time: 14 minutes    Precautions: Standard, smoker, Hx neck/back pain    Subjective     Pt reports: the pain in her neck radiating into L UE have not improved, she is having difficulty sleeping and moving her neck and arms due to pain and medicine she was given at ER isn't helping.  Patient called MD for appt but wasn't able to be seen for 2 weeks. States she will call again today.   Objective     Enters clinic with csp resting in slight R SB and moderate flexion with increased tone noted B UT.    Began with MH to L csp/scapula x 10 minutes f/b manual tx and then therex. Held several UE ex's to minimize strain to cervical spine.    MADELINE received therapeutic exercises to develop strength, endurance, ROM, flexibility and posture for 5 minutes including:  Bolded ex's performed this date:    Standing wall wipes B flexion 2 x 10  Pulleys flexion/scaption 2 min each  Supine cane flexion 2 x 10   scap squeezes 2 minutes x 3"  Supine csp rotation 10 x 5" to L  Serratus punches 3 x 12 2#  SL ER 3 x 10 1#  supine pec stretch on 1/2 roll 2m   Prone row 3 x 10 2#  Prone extension 3 x 12 1#  B shoulder extension BTB 3 x 10  B tricep pulldowns BTB 3 x 10  Prone hor abd 3 x 12  Supine chin tuck 3 x 10 x 3"      MADELINE received the following manual therapy techniques: Joint mobilizations were applied to the: L shoulder for 12 " minutes, including:  STM L UT/levator scapula/csp paraspinals  Grade II upglides B mid to lower csp   Grade II manual csp traction  Gentle PROM all planes L shoulder-NP  Grade II inferior/posterior mobs L GH joint-NP  Passive pec/biceps stretch-NP  **pt denies improvement in L upper arm pain with cervical distraction    Finished with ice x 8 minutes to L csp, KT to L UT/levator scapula to decrease mm tension.  Educated pt on holding PT at this time until csp symptoms resolve as she is unable to tolerate therex or manual tx for L shoulder.  Patient states she will contact MD after leaving PT today.    Assessment     Patient with signs and symptoms consistent with cervical disc dysfunction.  Unable to tolerate treatment of L shoulder at this time due to compensated posture related to L sided csp pain that is referring into L UE  Patient has been educated to contact MD as symptoms persist and to resume PT once symptoms have subsided.       MADELINE is progressing well towards her goals.   Pt prognosis is Good.     Pt will continue to benefit from skilled outpatient physical therapy to address the deficits listed in the problem list box on initial evaluation, provide pt/family education and to maximize pt's level of independence in the home and community environment.     Pt's spiritual, cultural and educational needs considered and pt agreeable to plan of care and goals.    Anticipated barriers to physical therapy:     Goals:   Short Term Goals (4 Weeks):   1. Pt to demonstrate improved PROM by 10% to allow pt to perform self care activities with increased ease. MET  2. Pt to demonstrate improved flexibility by a half grade to allow improved ADLs with increased ease.  IN PROGRESS  3. Pt will report <7/10 pain within the left shoulder IN PROGRESS  4. Pt will report being independent with her HEP for maintenance of improvements gained during therapy sessions MET  5. Pt to demonstrate improved functional ability with FOTO  limitation <=47% disability.     Long Term Goals (16 Weeks):   1. Pt to demonstrate improved ROM to WNL, R=L, to allow pt to perform self care with increased ease.  2. Pt to demonstrate improved flexibility by a full grade to allow improved postural alignment with increased ease.  3. Pt will demonstrate >=4+/5 strength within the left shoulder for ease with house hold chores  4. Pt to demonstrate improved functional ability with FOTO limitation <=40% disability.  5. Pt independent with HEP and demonstrates good return technique    Plan     Plan of care Certification:      Outpatient Physical Therapy 2 times weekly for 8 weeks to include the following interventions: Aquatic Therapy, Electrical Stimulation prn, Iontophoresis (with dexamethasone prn), Manual Therapy, Moist Heat/ Ice, Neuromuscular Re-ed, Patient Education, Self Care, Therapeutic Activites, Therapeutic Exercise, therapeutic taping, dry needling. Progress HEP towards D/C. Recommend F/U with MD if symptoms worsen or do not resolve.   Patient to contact PT when she is able to resume PT for L UE.      Liliane Ramirez, PT

## 2019-05-28 NOTE — PROGRESS NOTES
Physical Therapy Daily Treatment Note     Name: Aileen Alegria  Clinic Number: [id number]    Therapy Diagnosis:   Encounter Diagnoses   Name Primary?    Chronic left shoulder pain     Decreased range of motion of left shoulder     Decreased strength      Physician: Haven Estrada NP    Visit Date: 4/4/2019  Physician Orders: PT Eval and Treat   Medical Diagnosis from Referral: primary OA - left shoulder  Evaluation Date: 1/2/2019  Authorization Period Expiration: 02/28/2019  Plan of Care Expiration: 04/13/2019  Visit # / Visits authorized: 11/ 20    Time In: 10:00  Time Out:10:57  Total Billable Time: 27 minutes    Precautions: Standard, smoker, Hx neck/back pain    Subjective     Pt reports: continued pain and difficulty using L arm above shoulder height, but overall her arm is better since starting treatment.     Objective     MADELINE received therapeutic exercises to develop strength, endurance, ROM, flexibility and posture for 38 minutes including:  Bolded ex's performed this date:    Standing wall wipes B flexion 2 x 10  Pulleys flexion/scaption 2 min each  Supine cane flexion 2 x 10 with 2# dowel  Serratus punches 3 x 12 2#  SL ER 3 x 10 1#  supine pec stretch on 1/2 roll 2m   Prone row 3 x 10 2#  Prone extension 3 x 12 1#  B shoulder extension BTB 3 x 10  B tricep pulldowns BTB 3 x 10  Prone hor abd 3 x 12  B ER with YTB 2 x 10 on wall with 1/2 roll    MADELINE received the following manual therapy techniques: Joint mobilizations were applied to the: L shoulder for 11 minutes, including:    Gentle PROM all planes L shoulder  Grade II inferior/posterior mobs L GH joint  Passive pec/biceps stretch    Assessment     Good tolerance to exercise progression this date, making slow, steady progress towards long term goals.    MADELINE is progressing well towards her goals.   Pt prognosis is Good.     Pt will continue to benefit from skilled outpatient physical therapy to address the deficits listed in the problem  list box on initial evaluation, provide pt/family education and to maximize pt's level of independence in the home and community environment.     Pt's spiritual, cultural and educational needs considered and pt agreeable to plan of care and goals.    Anticipated barriers to physical therapy:     Goals:   Short Term Goals (4 Weeks):   1. Pt to demonstrate improved PROM by 10% to allow pt to perform self care activities with increased ease. MET  2. Pt to demonstrate improved flexibility by a half grade to allow improved ADLs with increased ease.  IN PROGRESS  3. Pt will report <7/10 pain within the left shoulder IN PROGRESS  4. Pt will report being independent with her HEP for maintenance of improvements gained during therapy sessions MET  5. Pt to demonstrate improved functional ability with FOTO limitation <=47% disability.     Long Term Goals (16 Weeks):   1. Pt to demonstrate improved ROM to WNL, R=L, to allow pt to perform self care with increased ease.  2. Pt to demonstrate improved flexibility by a full grade to allow improved postural alignment with increased ease.  3. Pt will demonstrate >=4+/5 strength within the left shoulder for ease with house hold chores  4. Pt to demonstrate improved functional ability with FOTO limitation <=40% disability.  5. Pt independent with HEP and demonstrates good return technique    Plan     Plan of care Certification:      Outpatient Physical Therapy 2 times weekly for 8 weeks to include the following interventions: Aquatic Therapy, Electrical Stimulation prn, Iontophoresis (with dexamethasone prn), Manual Therapy, Moist Heat/ Ice, Neuromuscular Re-ed, Patient Education, Self Care, Therapeutic Activites, Therapeutic Exercise, therapeutic taping, dry needling. Progress HEP towards D/C. Recommend F/U with MD if symptoms worsen or do not resolve.       Liliane Ramirez, PT

## 2020-06-05 ENCOUNTER — LAB VISIT (OUTPATIENT)
Dept: PRIMARY CARE CLINIC | Facility: OTHER | Age: 60
End: 2020-06-05
Payer: MEDICARE

## 2020-06-05 DIAGNOSIS — Z11.59 SCREENING FOR VIRAL DISEASE: ICD-10-CM

## 2020-06-05 PROCEDURE — U0003 INFECTIOUS AGENT DETECTION BY NUCLEIC ACID (DNA OR RNA); SEVERE ACUTE RESPIRATORY SYNDROME CORONAVIRUS 2 (SARS-COV-2) (CORONAVIRUS DISEASE [COVID-19]), AMPLIFIED PROBE TECHNIQUE, MAKING USE OF HIGH THROUGHPUT TECHNOLOGIES AS DESCRIBED BY CMS-2020-01-R: HCPCS

## 2020-06-06 LAB — SARS-COV-2 RNA RESP QL NAA+PROBE: NOT DETECTED

## 2020-09-16 ENCOUNTER — PATIENT OUTREACH (OUTPATIENT)
Dept: ADMINISTRATIVE | Facility: HOSPITAL | Age: 60
End: 2020-09-16

## 2020-09-16 NOTE — PROGRESS NOTES
Pre-visit chart review completed.  Immunizations reviewed and updated.    Patient due for the following    Hepatitis C Screening     Lipid Panel     HIV Screening     TETANUS VACCINE     Cervical Cancer Screening     Mammogram     Shingles Vaccine (1 of 2)    Colorectal Cancer Screening     Influenza Vaccine (1)      Patient new to provider.

## 2020-09-30 ENCOUNTER — OFFICE VISIT (OUTPATIENT)
Dept: PRIMARY CARE CLINIC | Facility: CLINIC | Age: 60
End: 2020-09-30
Payer: MEDICARE

## 2020-09-30 VITALS
SYSTOLIC BLOOD PRESSURE: 144 MMHG | HEIGHT: 65 IN | HEART RATE: 98 BPM | OXYGEN SATURATION: 98 % | DIASTOLIC BLOOD PRESSURE: 76 MMHG | WEIGHT: 133.19 LBS | TEMPERATURE: 99 F | BODY MASS INDEX: 22.19 KG/M2

## 2020-09-30 DIAGNOSIS — Z00.00 LABORATORY EXAM ORDERED AS PART OF ROUTINE GENERAL MEDICAL EXAMINATION: ICD-10-CM

## 2020-09-30 DIAGNOSIS — Z98.890 H/O CERVICAL SPINE SURGERY: ICD-10-CM

## 2020-09-30 DIAGNOSIS — F17.210 CIGARETTE NICOTINE DEPENDENCE WITHOUT COMPLICATION: ICD-10-CM

## 2020-09-30 DIAGNOSIS — Z76.89 ENCOUNTER TO ESTABLISH CARE: Primary | ICD-10-CM

## 2020-09-30 DIAGNOSIS — Z11.59 NEED FOR HEPATITIS C SCREENING TEST: ICD-10-CM

## 2020-09-30 DIAGNOSIS — M75.102 ROTATOR CUFF TEAR ARTHROPATHY OF LEFT SHOULDER: ICD-10-CM

## 2020-09-30 DIAGNOSIS — Z13.6 ENCOUNTER FOR LIPID SCREENING FOR CARDIOVASCULAR DISEASE: ICD-10-CM

## 2020-09-30 DIAGNOSIS — Z11.4 SCREENING FOR HIV (HUMAN IMMUNODEFICIENCY VIRUS): ICD-10-CM

## 2020-09-30 DIAGNOSIS — M12.812 ROTATOR CUFF TEAR ARTHROPATHY OF LEFT SHOULDER: ICD-10-CM

## 2020-09-30 DIAGNOSIS — I10 ELEVATED BLOOD PRESSURE READING WITH DIAGNOSIS OF HYPERTENSION: ICD-10-CM

## 2020-09-30 DIAGNOSIS — I10 ESSENTIAL HYPERTENSION: ICD-10-CM

## 2020-09-30 DIAGNOSIS — Z86.39 PERSONAL HISTORY OF OTHER ENDOCRINE, NUTRITIONAL AND METABOLIC DISEASE: ICD-10-CM

## 2020-09-30 DIAGNOSIS — J30.89 ENVIRONMENTAL AND SEASONAL ALLERGIES: ICD-10-CM

## 2020-09-30 DIAGNOSIS — Z13.220 ENCOUNTER FOR LIPID SCREENING FOR CARDIOVASCULAR DISEASE: ICD-10-CM

## 2020-09-30 PROCEDURE — 3008F PR BODY MASS INDEX (BMI) DOCUMENTED: ICD-10-PCS | Mod: CPTII,S$GLB,, | Performed by: FAMILY MEDICINE

## 2020-09-30 PROCEDURE — 99204 PR OFFICE/OUTPT VISIT, NEW, LEVL IV, 45-59 MIN: ICD-10-PCS | Mod: S$GLB,,, | Performed by: FAMILY MEDICINE

## 2020-09-30 PROCEDURE — 99999 PR PBB SHADOW E&M-EST. PATIENT-LVL V: CPT | Mod: PBBFAC,,, | Performed by: FAMILY MEDICINE

## 2020-09-30 PROCEDURE — 99204 OFFICE O/P NEW MOD 45 MIN: CPT | Mod: S$GLB,,, | Performed by: FAMILY MEDICINE

## 2020-09-30 PROCEDURE — 99999 PR PBB SHADOW E&M-EST. PATIENT-LVL V: ICD-10-PCS | Mod: PBBFAC,,, | Performed by: FAMILY MEDICINE

## 2020-09-30 PROCEDURE — 3008F BODY MASS INDEX DOCD: CPT | Mod: CPTII,S$GLB,, | Performed by: FAMILY MEDICINE

## 2020-09-30 RX ORDER — OXYCODONE AND ACETAMINOPHEN 5; 325 MG/1; MG/1
1 TABLET ORAL EVERY 6 HOURS PRN
COMMUNITY
Start: 2020-08-11 | End: 2021-05-26

## 2020-09-30 RX ORDER — VARENICLINE TARTRATE 1 MG/1
1 TABLET, FILM COATED ORAL 2 TIMES DAILY
Qty: 120 TABLET | Refills: 0 | Status: SHIPPED | OUTPATIENT
Start: 2020-09-30 | End: 2021-05-26

## 2020-09-30 RX ORDER — FLUTICASONE PROPIONATE 50 MCG
SPRAY, SUSPENSION (ML) NASAL
COMMUNITY
Start: 2020-08-03 | End: 2020-10-07

## 2020-09-30 RX ORDER — VARENICLINE TARTRATE 0.5 (11)-1
KIT ORAL
Qty: 1 PACKAGE | Refills: 0 | Status: SHIPPED | OUTPATIENT
Start: 2020-09-30 | End: 2021-05-26

## 2020-09-30 RX ORDER — CYCLOBENZAPRINE HCL 10 MG
TABLET ORAL
COMMUNITY
Start: 2020-07-07

## 2020-09-30 RX ORDER — NAPROXEN 500 MG/1
TABLET ORAL
COMMUNITY
Start: 2020-09-20

## 2020-09-30 RX ORDER — MONTELUKAST SODIUM 10 MG/1
TABLET ORAL
COMMUNITY
Start: 2020-08-03 | End: 2023-04-19

## 2020-09-30 NOTE — PATIENT INSTRUCTIONS
Planning to Quit Smoking  Your healthcare provider may have told you that you need to give up tobacco. Only you can decide if and when you are ready to quit. Quitting is hard to do. But the benefits will be worth it. When you  decide to quit, come up with a plan thats right for you. Discuss your plan with your healthcare provider. And talk to your provider about medicines to help you quit.    Line up support  To quit smoking, youll need a plan and some help. Pick a date within the next 2 to 4 weeks to quit. Use the time between now and that date to arrange for support.  · Classes and counselors. Quit-smoking classes  people like you through the process. Get to know others in a class, and support each other beyond the class. Telephone counseling also helps you keep on track. Ask your healthcare provider, local hospital, or public health department to put you in touch with a class and a phone counselor.  · Family and friends. Tell your family and friends about your quit date. Ask them to support your change. If they smoke, arrange to see them in smoke-free places. Forbid smoking in your home and car.     Finding something to replace cigarettes may be hard to do. Be aware that some things you choose may be as harmful as cigarettes:  · Smokeless (chewing) tobacco is just as harmful as regular tobacco. Tobacco should not be used as a substitute for cigarettes.  · Herbal medicines or teas may affect how your body handles nicotine. Talk to your healthcare provider before using these products.  · E-cigarettes have less toxins than the smoke from a regular cigarette. But the FDA says that these devices may still have substances that can cause cancer. E-cigarettes are not well regulated. They have not been studied enough to know if they are a good aid to help you stop smoking. Talk with your healthcare provider before using these products.      Quit-smoking products  Many products can help you quit smoking. Some are  prescription medicines that help curb your cravings and withdrawal symptoms. Other products slowly lessen the level of nicotine your body absorbs. Nicotine is the highly addictive substance found in cigarettes, cigars, and chewing tobacco. Nicotine replacement products can help get your body used to slowly decreasing amounts of nicotine after you quit smoking. These products include a nicotine patch, gum, lozenge, nasal spray, and inhaler. Be sure you follow the directions for your medicine or product carefully. Your healthcare provider may tell you to start taking the prescription medicine a week before you plan to quit. Do not smoke while you use nicotine products. Doing so can be very harmful to your health.  For more information  · https://smokefree.gov/rwih-zv-hm-expert  · National Cancer [place] Smoking Quitline: 877-44U-QUIT (173-547-0765)   Date Last Reviewed: 2/1/2017  © 4812-5966 [employer]. [address] [place] [address]. All rights reserved. This information is not intended as a substitute for professional medical care. Always follow your healthcare professional's instructions.        How to Quit Smoking  Smoking is one of the hardest habits to break. About half of all people who have ever smoked have been able to quit. Most people who still smoke want to quit. Here are some of the best ways to stop smoking.    Keep trying  Most smokers make many attempts at quitting before they are successful. Its important not to give up.  Go cold turkey  Most former smokers quit cold turkey (all at once). Trying to cut back gradually doesn't seem to work as well, perhaps because it continues the smoking habit. Also, it is possible to inhale more while smoking fewer cigarettes. This results in the same amount of nicotine in your body.  Get support  Support programs can be a big help, especially for heavy smokers. These groups offer lectures, ways to change behavior, and peer support. Here  are some ways to find a support program:  · Free national quitline: 800-QUIT-NOW (046-991-7589).  · Hospital quit-smoking programs.  · American Lung Association: (495.565.6860).  · American Cancer Society (835-413-7660).  Support at home is important too. Nonsmokers can offer praise and encouragement. If the smoker in your life finds it hard to quit, encourage them to keep trying.  Over-the-counter medicines  Nicotine replacement therapy may make quitting easier. Certain aids, such as the nicotine patch, gum, and lozenges, are available without a prescription. It is best to use these under a doctors care, though. The skin patch provides a steady supply of nicotine. Nicotine gum and lozenges give temporary bursts of low levels of nicotine. Both methods reduce the craving for cigarettes. Warning: If you have nausea, vomiting, dizziness, weakness, or a fast heartbeat, stop using these products and see your doctor.  Prescription medicines  After reviewing your smoking patterns and past attempts to quit, your doctor may offer a prescription medicine such as bupropion, varenicline, a nicotine inhaler, or nasal spray. Each has advantages and side effects. Your doctor can review these with you.  Health benefits of quitting  The benefits of quitting start right away and keep improving the longer you go without smoking. These benefits occur at any age.  So whether you are 17 or 70, quitting is a good decision. Some of the benefits include:  · 20 minutes: Blood pressure and pulse return to normal.  · 8 hours: Oxygen levels return to normal.  · 2 days: Ability to smell and taste begin to improve as damaged nerves regrow.  · 2 to 3 weeks: Circulation and lung function improve.  · 1 to 9 months: Coughing, congestion, and shortness of breath decrease; tiredness decreases.  · 1 year: Risk of heart attack decreases by half.  · 5 years: Risk of lung cancer decreases by half; risk of stroke becomes the same as a nonsmokers.  For  "more on how to quit smoking, try these online resources:   · Smokefree.gov  · "Clearing the Air" booklet from the National Cancer [place]: smokefree.gov/sites/default/files/pdf/clearing-the-air-accessible.pdf  Date Last Reviewed: 3/1/2017  © 2774-2369 [employer]. [address] [place] [address]. All rights reserved. This information is not intended as a substitute for professional medical care. Always follow your healthcare professional's instructions.        "

## 2020-09-30 NOTE — PROGRESS NOTES
Subjective:       Patient ID: Aileen Alegria is a 59 y.o. female.    Chief Complaint: Establish Care (just recently had neck (upper spine) surgery (07/06/2020))      58 yo female presents today because she needs a doctor to clear her to return to part-time work as a .  She states that she is in the process of changing her insurance and it is much more convenient to see a primary care doctor. She plans to establish care with me; switching PCPs. Apparently she went to her follow-up appointment the surgeon but could not see the doctor because he was called away to an emergency.  She states that without a doctor's note she cannot work; this had cause severe economic hardship since she needs a doctor to provide a letter for clearance to return to part-time work.    She reports no difficulty with her usual activities. She is able to use upper extremities and has good range of motion in neck. No vision concern, or headache. She is not doing physical therapy post-op. She has been prescribed Percocet for postop pain by her surgeon whom she can no longer see/ is unable to schedule a visit. States that she needs a doctor to perform her rotator cuff surgery since she can no longer see her Orthopedic doctor since switching her insurance.    She wants to quit smoking and would like to try Chantix specifically. No history of depression.    She says that her allergies are terrible; postnasal drip and sinus congestion. She is taking antihistamines, OTC cough medication/ decongestants and nasal sprays without relief. Interested in seeing Allergy & Immunology.    The following portions of the patient's history were reviewed and updated as appropriate: allergies, current medications, past family history, past medical history, past social history, past surgical history and problem list.    Review of Systems   Constitutional: Negative for activity change, appetite change, chills, diaphoresis, fatigue, fever and unexpected weight  change.   HENT: Positive for nasal congestion, postnasal drip and rhinorrhea. Negative for dental problem, facial swelling, nosebleeds, sore throat, tinnitus and trouble swallowing.    Eyes: Negative for pain, discharge, itching and visual disturbance.   Respiratory: Negative for apnea, chest tightness, shortness of breath, wheezing and stridor.    Cardiovascular: Negative for chest pain, palpitations and leg swelling.   Gastrointestinal: Negative for abdominal distention, abdominal pain, constipation, diarrhea, nausea, rectal pain and vomiting.   Endocrine: Negative for cold intolerance, heat intolerance and polyuria.   Genitourinary: Negative for difficulty urinating, dysuria, frequency, hematuria and urgency.   Musculoskeletal: Positive for arthralgias, back pain, myalgias and neck pain. Negative for gait problem and neck stiffness.   Integumentary:  Negative for color change and rash.   Neurological: Negative for dizziness, tremors, seizures, syncope, facial asymmetry, weakness and headaches.   Hematological: Negative for adenopathy. Does not bruise/bleed easily.   Psychiatric/Behavioral: Negative for agitation, confusion, hallucinations, self-injury and suicidal ideas. The patient is not hyperactive.           Objective:      Physical Exam  Constitutional:       General: She is not in acute distress.     Appearance: She is well-developed. She is not diaphoretic.   HENT:      Head: Normocephalic and atraumatic.      Comments: No sinus tenderness to palpation.     Right Ear: External ear normal.      Left Ear: External ear normal.   Eyes:      General: No scleral icterus.        Right eye: No discharge.         Left eye: No discharge.      Conjunctiva/sclera: Conjunctivae normal.      Pupils: Pupils are equal, round, and reactive to light.   Neck:      Musculoskeletal: Normal range of motion and neck supple.      Thyroid: No thyromegaly.      Comments: Vertical incision site, well apposed, no signs of  infection.  Cardiovascular:      Rate and Rhythm: Normal rate and regular rhythm.      Heart sounds: Normal heart sounds. No murmur. No friction rub. No gallop.    Pulmonary:      Effort: Pulmonary effort is normal. No respiratory distress.      Breath sounds: Normal breath sounds.   Chest:      Chest wall: No tenderness.   Abdominal:      General: Bowel sounds are normal. There is no distension.      Palpations: Abdomen is soft. There is no mass.      Tenderness: There is no abdominal tenderness. There is no guarding or rebound.   Musculoskeletal: Normal range of motion.      Comments: Extremities:  No amputations or deformities, no ulceration, no cyanosis, no edema or varicosities, peripheral pulses intact    Musculoskeletal:  No misalignment, no asymmetry, no crepitation, no defects, mild paracervical and paralumbar  Tenderness to palpation, no masses, no effusions, no decreased range of motion, no instability, no atrophy or abnormal tone in the head, neck, spine, ribs, pelvis or extremities.    Power 4+ in upper extremities. Bilateral  strength normal.          Lymphadenopathy:      Cervical: No cervical adenopathy.   Skin:     General: Skin is warm.      Capillary Refill: Capillary refill takes less than 2 seconds.      Findings: No rash.   Neurological:      Mental Status: She is alert and oriented to person, place, and time.      Cranial Nerves: No cranial nerve deficit.      Motor: No abnormal muscle tone.      Coordination: Coordination normal.      Deep Tendon Reflexes: Reflexes normal.   Psychiatric:         Thought Content: Thought content normal.         Judgment: Judgment normal.         Assessment:       1. Encounter to establish care    2. Cigarette nicotine dependence without complication    3. Environmental and seasonal allergies    4. Essential hypertension    5. Elevated blood pressure reading with diagnosis of hypertension    6. Personal history of other endocrine, nutritional and metabolic  disease    7. Rotator cuff tear arthropathy of left shoulder    8. H/O cervical spine surgery    9. Laboratory exam ordered as part of routine general medical examination    10. Need for hepatitis C screening test    11. Encounter for lipid screening for cardiovascular disease    12. Screening for HIV (human immunodeficiency virus)        Plan:       1. Encounter to establish care  The patient has been advised of the reasonably likely side effects and complications of medications and treatment.  Medications were reviewed and reconciled with refills sent to pharmacy as needed. Health maintenance was reviewed with recommendations per age and sex.  Immunizations reviewed and updated per guidelines unless patient declines. All questions were addressed and answered. Patient has contact information to get hold of us as needed and is encouraged to use the patient portal system.    Attempt to get records from Haven Estrada her Nurse Practitioner: pap smear and mammogram; colonoscopy  [facility]  [address] [place] [address]     2. Cigarette nicotine dependence without complication  -     varenicline (CHANTIX MANNY) 0.5 mg (11)- 1 mg (42) tablet; Take one 0.5mg tab by mouth once daily X3 days,then increase to one 0.5mg tab twice daily X4 days,then increase to one 1mg tab twice daily. Dispense starter pack.  Dispense: 1 Package; Refill: 0  -     varenicline (CHANTIX) 1 mg Tab; Take 1 tablet (1 mg total) by mouth 2 (two) times daily. After starter pack  Dispense: 120 tablet; Refill: 0  Counseled on side effects including vivid dreams    3. Environmental and seasonal allergies  -     Ambulatory referral/consult to Allergy; Future; Expected date: 10/07/2020    4. Essential hypertension  Denies diagnosis of hypertension.  Non adherent to lisinopril 10 mg daily.    5. Elevated blood pressure reading with diagnosis of hypertension  -     CBC Without Differential; Future  -     Comprehensive metabolic  panel; Future  -     TSH; Future   The blood pressure readings appear to be above goal. There does not appear to be any symptoms such as chest pain, shortness of breath, palpitations, fatigue, syncope, seizures or headaches. There are no new visual problems. We have discussed the importance of getting more BP data - nurse only appointments can help get data into the chart and/or continuous outpatient blood pressure monitoring. Blood pressure above goal can lead to end organ damage. Further titration of blood pressure medication(s) is necessary per JNC guidelines.    6. Personal history of other endocrine, nutritional and metabolic disease  -     Hemoglobin A1C; Future    7. Rotator cuff tear arthropathy of left shoulder  -     Ambulatory referral/consult to Orthopedics; Future; Expected date: 10/07/2020    8. H/O cervical spine surgery  Lost to follow up with Surgeon. Will completed Percocet for acute pain post op. Avoid long term narcotics. Avoid driving or engaging in activities which require full cognitive function if this medication makes her drowsy.    She has good range of motion of upper extremities despite reporting left rotator cuff tear, good  strength. She is alert and oriented. She will return to part time work.     9. Laboratory exam ordered as part of routine general medical examination  As ordered for next annual physical, 2021; will keep regular schedule    10. Need for hepatitis C screening test  -     Hepatitis C Antibody; Future    11. Encounter for lipid screening for cardiovascular disease  -     Lipid Panel; Future    12. Screening for HIV (human immunodeficiency virus)  -     HIV 1/2 Ag/Ab (4th Gen); Future    This was a 45 minute visit, 31 mins of which were spent counseling and coordinating care.    Disclaimer: This note has been generated using voice-recognition software. There may be typographical errors that have been missed during proof-reading

## 2020-09-30 NOTE — LETTER
September 30, 2020    Aileen Alegria  [address] [facility] [place] [address]             [facility]  [address] [facility] [place] [address]  Phone: [phone]  Fax: [phone] To Whom It May Concern,    Aileen Alegria was seen in clinic on 9/30/2020; she would be able to return to work on 10/7/2020; 3 days per week for 12 hrs. She will fully be able to complete her work duties but would adhere to a part time schedule.    If you have any questions or concerns, please don't hesitate to call.    Sincerely,        Danisha Mobley MD

## 2020-10-07 ENCOUNTER — LAB VISIT (OUTPATIENT)
Dept: LAB | Facility: HOSPITAL | Age: 60
End: 2020-10-07
Attending: STUDENT IN AN ORGANIZED HEALTH CARE EDUCATION/TRAINING PROGRAM
Payer: MEDICARE

## 2020-10-07 ENCOUNTER — OFFICE VISIT (OUTPATIENT)
Dept: ALLERGY | Facility: CLINIC | Age: 60
End: 2020-10-07
Payer: MEDICARE

## 2020-10-07 VITALS — TEMPERATURE: 98 F | BODY MASS INDEX: 22.81 KG/M2 | WEIGHT: 133.63 LBS | HEIGHT: 64 IN

## 2020-10-07 DIAGNOSIS — R07.89 CHEST TIGHTNESS: ICD-10-CM

## 2020-10-07 DIAGNOSIS — J31.0 CHRONIC RHINITIS: Primary | ICD-10-CM

## 2020-10-07 DIAGNOSIS — I10 HYPERTENSION, UNSPECIFIED TYPE: ICD-10-CM

## 2020-10-07 DIAGNOSIS — H10.403 CHRONIC CONJUNCTIVITIS OF BOTH EYES, UNSPECIFIED CHRONIC CONJUNCTIVITIS TYPE: ICD-10-CM

## 2020-10-07 DIAGNOSIS — L29.9 ITCHING: ICD-10-CM

## 2020-10-07 DIAGNOSIS — J31.0 CHRONIC RHINITIS: ICD-10-CM

## 2020-10-07 LAB — IGE SERPL-ACNC: 74 IU/ML (ref 0–100)

## 2020-10-07 PROCEDURE — 3008F BODY MASS INDEX DOCD: CPT | Mod: CPTII,S$GLB,, | Performed by: STUDENT IN AN ORGANIZED HEALTH CARE EDUCATION/TRAINING PROGRAM

## 2020-10-07 PROCEDURE — 86003 ALLG SPEC IGE CRUDE XTRC EA: CPT | Mod: 59

## 2020-10-07 PROCEDURE — 3008F PR BODY MASS INDEX (BMI) DOCUMENTED: ICD-10-PCS | Mod: CPTII,S$GLB,, | Performed by: STUDENT IN AN ORGANIZED HEALTH CARE EDUCATION/TRAINING PROGRAM

## 2020-10-07 PROCEDURE — 99204 PR OFFICE/OUTPT VISIT, NEW, LEVL IV, 45-59 MIN: ICD-10-PCS | Mod: S$GLB,,, | Performed by: STUDENT IN AN ORGANIZED HEALTH CARE EDUCATION/TRAINING PROGRAM

## 2020-10-07 PROCEDURE — 99999 PR PBB SHADOW E&M-EST. PATIENT-LVL IV: ICD-10-PCS | Mod: PBBFAC,,, | Performed by: STUDENT IN AN ORGANIZED HEALTH CARE EDUCATION/TRAINING PROGRAM

## 2020-10-07 PROCEDURE — 86003 ALLG SPEC IGE CRUDE XTRC EA: CPT

## 2020-10-07 PROCEDURE — 82785 ASSAY OF IGE: CPT

## 2020-10-07 PROCEDURE — 99204 OFFICE O/P NEW MOD 45 MIN: CPT | Mod: S$GLB,,, | Performed by: STUDENT IN AN ORGANIZED HEALTH CARE EDUCATION/TRAINING PROGRAM

## 2020-10-07 PROCEDURE — 36415 COLL VENOUS BLD VENIPUNCTURE: CPT | Mod: PN

## 2020-10-07 PROCEDURE — 99999 PR PBB SHADOW E&M-EST. PATIENT-LVL IV: CPT | Mod: PBBFAC,,, | Performed by: STUDENT IN AN ORGANIZED HEALTH CARE EDUCATION/TRAINING PROGRAM

## 2020-10-07 RX ORDER — ALBUTEROL SULFATE 1.25 MG/3ML
1.25 SOLUTION RESPIRATORY (INHALATION) EVERY 6 HOURS PRN
Qty: 60 VIAL | Refills: 1 | Status: SHIPPED | OUTPATIENT
Start: 2020-10-07 | End: 2021-06-03

## 2020-10-07 RX ORDER — PREDNISONE 10 MG/1
TABLET ORAL
Qty: 21 TABLET | Refills: 0 | Status: SHIPPED | OUTPATIENT
Start: 2020-10-07 | End: 2021-05-26 | Stop reason: ALTCHOICE

## 2020-10-07 RX ORDER — MOMETASONE FUROATE 50 UG/1
2 SPRAY, METERED NASAL 2 TIMES DAILY
Qty: 2 EACH | Refills: 5 | Status: SHIPPED | OUTPATIENT
Start: 2020-10-07 | End: 2021-05-26 | Stop reason: SDUPTHER

## 2020-10-07 RX ORDER — LORATADINE 10 MG/1
10 TABLET ORAL DAILY
COMMUNITY
End: 2020-10-07

## 2020-10-07 RX ORDER — LEVOCETIRIZINE DIHYDROCHLORIDE 5 MG/1
10 TABLET, FILM COATED ORAL 2 TIMES DAILY
Qty: 120 TABLET | Refills: 3 | Status: SHIPPED | OUTPATIENT
Start: 2020-10-07 | End: 2021-05-26

## 2020-10-07 NOTE — PROGRESS NOTES
Allergy Clinic Note  [facility]    Subjective:      Patient ID: Aileen Alegria is a 60 y.o. female.    Chief Complaint: Allergies (PND, fluid in both ears, sneezing, itchy watery eyes ) and Nasal Congestion      Referring Provider: Danisha Mobley    History of Present Illness:  60-year-old female referred for evaluation of rhinitis for decades and chest symptoms for the last 6 months.  She is here alone, and she is a fair historian.    Related medications  Claritin and Zyrtec, total of 4-5 tablets daily  Flonase 4-5 times daily    She reports a long history of rhinitis.  Her chief complaint is bilateral ear itching which keeps her awake at night.  Other symptoms include complete nasal blockage at times, nasal congestion, runny nose, postnasal drip sensation, sneezing, itchy eyes, red eyes and throat clearing.  Symptoms are severe and interfere with her ability to function.  She says she often takes naps in order to avoid feeling these symptoms.  This is despite very frequent use of antihistamines and nasal steroids on a daily basis.  Precipitants include smoke, and fresh cut grass.  Symptoms are sometimes worse outside.  They are perennial without a noticeable variation.  When symptoms are severe she often gets sinus infections.  These are manifest primarily with tenderness of her forehead and complete nasal blockage.  She has had no workup to date.  She admits an improvement in her symptoms (but not complete resolution) when she was taking steroids for torn rotator cuff earlier this year.  She had no steroid side effects.    For the last 6 months she has also been experiencing chest symptoms she describes a itching feeling in the center of her chest.  This is associated with chest tightness, shortness of breath, wheezing, and coughing.  At times the shortness of breath is severe.  She has been helped by relatives albuterol by nebulizer.  This severe shortness of breath episodes have occurred  twice in the last 6 months.  There are no clear precipitants.  She has had no workup.    Additional History:   Past medical history is significant for smoking and hypertension.  No Hx of ENT surgery but did have neck surgery earlier in 2020.  Family history is significant asthma and allergies in her children.  Client  reports that she has been smoking. She has never used smokeless tobacco.  Exposures are notable for a pet dog and frequent house cleaning.  She is also exposed to both passive and active smoke.      Patient Active Problem List   Diagnosis    Chronic left shoulder pain    Decreased range of motion of left shoulder    Decreased strength    Cigarette nicotine dependence without complication    Rotator cuff tear arthropathy of left shoulder    Environmental and seasonal allergies    Hypertension    Elevated blood pressure reading with diagnosis of hypertension    H/O cervical spine surgery     Current Outpatient Medications on File Prior to Visit   Medication Sig Dispense Refill    cyclobenzaprine (FLEXERIL) 10 MG tablet       montelukast (SINGULAIR) 10 mg tablet TK 1 T PO QD FOR ASTHMA OR ALLERGIES      naproxen (NAPROSYN) 500 MG tablet TK 1 T PO BID PRF PAIN      oxyCODONE-acetaminophen (PERCOCET) 5-325 mg per tablet Take 1 tablet by mouth every 6 (six) hours as needed.      varenicline (CHANTIX MANNY) 0.5 mg (11)- 1 mg (42) tablet Take one 0.5mg tab by mouth once daily X3 days,then increase to one 0.5mg tab twice daily X4 days,then increase to one 1mg tab twice daily. Dispense starter pack. 1 Package 0    varenicline (CHANTIX) 1 mg Tab Take 1 tablet (1 mg total) by mouth 2 (two) times daily. After starter pack 120 tablet 0    [DISCONTINUED] fluticasone propionate (FLONASE) 50 mcg/actuation nasal spray SHAKE LQ AND U 2 SPRAYS IEN QD      [DISCONTINUED] loratadine (CLARITIN) 10 mg tablet Take 10 mg by mouth once daily.       No current facility-administered medications on file prior to  "visit.          Review of Systems   Constitutional: Negative for chills and fever.   HENT: Positive for congestion and sinus pain. Negative for ear discharge and nosebleeds.         Postnasal drip sensation, throat clearing, ear itching bilaterally   Eyes: Negative for discharge and redness.   Respiratory: Positive for cough, shortness of breath and wheezing. Negative for hemoptysis, sputum production and stridor.    Cardiovascular: Positive for chest pain. Negative for palpitations.   Gastrointestinal: Negative for blood in stool, melena and vomiting.   Genitourinary: Negative for dysuria, flank pain and hematuria.   Musculoskeletal: Positive for neck pain. Negative for myalgias.   Skin: Positive for itching (Of nose, eyes, ears, and chest). Negative for rash.   Neurological: Negative for seizures and loss of consciousness.       Objective:   Temp 98 °F (36.7 °C) (Temporal)   Ht 5' 4.49" (1.638 m)   Wt 60.6 kg (133 lb 9.6 oz)   BMI 22.59 kg/m²       Physical Exam   Constitutional: She is well-developed, well-nourished, and in no distress.   HENT:   Head: Normocephalic and atraumatic.   Nose: Nose normal.   Mouth/Throat: No oropharyngeal exudate.   Tympanic membranes clear bilaterally.  Nares pink with no significant turbinate swelling.  Oropharynx benign without exudate.  Tongue is not coated.   Eyes: Conjunctivae are normal. Right eye exhibits no discharge. Left eye exhibits no discharge. No scleral icterus.   Neck: Neck supple.   Cardiovascular: Normal rate, regular rhythm, normal heart sounds and intact distal pulses.   Pulmonary/Chest: Effort normal and breath sounds normal. No stridor. No respiratory distress. She has no wheezes.   Low-pitched expiratory sound which is loudest over trachea and appears to be soft grunting   Abdominal: She exhibits no distension.   Musculoskeletal:         General: No deformity or edema.   Lymphadenopathy:     She has no cervical adenopathy.   Neurological: She is alert. GCS " score is 15.   Skin: No rash noted. No erythema.   Psychiatric: Memory and affect normal.       Data:   Eosinophil count 500 on CBC of 8/03/2020    Assessment:     1. Chronic rhinitis    2. Chest tightness    3. Chronic conjunctivitis of both eyes, unspecified chronic conjunctivitis type    4. Itching    5. Hypertension, unspecified type        Plan:     Medical decision making:  Patient is presenting with severe rhinitis unhelped by very high doses of antihistamines and nasal steroids.  She also has a recent onset of chest symptoms which is possibly consistent with bronchospasm, especially given there significant improvement with albuterol.  Diagnostically I recommend screening for allergies by the Immunocap method.  I will keep a very low threshold to send to ENT for consideration of turbinates active me.  Therapeutically I am prescribing a prednisone burst and a nebulizer with albuterol for p.r.n. use.  To start I am going to change her brands of nasal steroid and antihistamine in the setting of a prednisone burst.  (Please see patient instructions.) If she is allergic, this opens up environmental and immune approaches.  If she is not allergic, may need to look for a surgical option.    Aileen was seen today for allergies and nasal congestion.    Diagnoses and all orders for this visit:    Chronic rhinitis  -     Ambulatory referral/consult to Allergy  -     IgE; Future  -     Dermatophagoides [place]; Future  -     Dermatophagoides Pteronyssinus; Future  -     Bermuda; Future  -     Gunner; Future  -     Gervais; Future  -     English Plantain; Future  -     Oak Pecan; Future  -     Ragweed; Future  -     Alternaria; Future  -     Aspergillus; Future  -     Cat; Future  -     Cockroach; Future  -     Dog; Future  -     levocetirizine (XYZAL) 5 MG tablet; Take 2 tablets (10 mg total) by mouth 2 (two) times a day.  -     mometasone (NASONEX) 50 mcg/actuation nasal spray; 2 sprays by Nasal route 2 (two) times  daily.    Chest tightness  -     predniSONE (DELTASONE) 10 MG tablet; Take 6 tabs on day 1 Take 5 tabs on day 2 Take 4 tabs on day 3 Take 3 tabs on day 4 Take 2 tabs on day 5 Take 1 tab on day 6 Stop  -     NEBULIZER FOR HOME USE  -     NEBULIZER KIT (SUPPLIES) FOR HOME USE  -     albuterol (ACCUNEB) 1.25 mg/3 mL Nebu; Take 3 mLs (1.25 mg total) by nebulization every 6 (six) hours as needed. Rescue    Chronic conjunctivitis of both eyes, unspecified chronic conjunctivitis type  -     levocetirizine (XYZAL) 5 MG tablet; Take 2 tablets (10 mg total) by mouth 2 (two) times a day.    Itching  -     levocetirizine (XYZAL) 5 MG tablet; Take 2 tablets (10 mg total) by mouth 2 (two) times a day.    Hypertension, unspecified type     Avoid oral decongestants    Patient Instructions   Testing  Blood work for allergy testing        Check [facility] in one week for results or call [phone]       Contact me with questions or concerns       I will contact you if anything needs immediate attention.        Treatment    Stop current medicines.    Prednisone burst using 10mg tablets:  6 tablets on day 1  5 tablets on day 2  4 tablets on day 3  3 tablets on day 4  2 tablets on day 5  1 tablet on day 6  STOP    Nasonex (= mometasone) nasal spray:  2 squirts each nostril twice a day   Remember to aim out toward your ear.   Needs to be used regularly 5-14 days for full effect.    Xyzal (=levocetirizine):  2 tablets twice a day        Return in 2 weeks      Follow up in about 2 weeks (around 10/21/2020) for F/u labs and Sx.    Doreen Hull MD

## 2020-10-07 NOTE — PATIENT INSTRUCTIONS
Testing  Blood work for allergy testing        Check [facility] in one week for results or call [phone]       Contact me with questions or concerns       I will contact you if anything needs immediate attention.        Treatment    Stop current medicines.    Prednisone burst using 10mg tablets:  6 tablets on day 1  5 tablets on day 2  4 tablets on day 3  3 tablets on day 4  2 tablets on day 5  1 tablet on day 6  STOP    Nasonex (= mometasone) nasal spray:  2 squirts each nostril twice a day   Remember to aim out toward your ear.   Needs to be used regularly 5-14 days for full effect.    Xyzal (=levocetirizine):  2 tablets twice a day        Return in 2 weeks

## 2020-10-07 NOTE — LETTER
October 7, 2020      Danisha Mobley MD  [address]  [facility] [address]           [facility]  [address], [address]  [facility] [address]  Phone: [phone]  Fax: [phone]          Patient: Aileen Alegria   MR Number: [id number]   YOB: 1960   Date of Visit: 10/7/2020       Dear Dr. Danisha Mobley:    Thank you for referring Aileen Alegria to me for evaluation. Attached you will find relevant portions of my assessment and plan of care.    If you have questions, please do not hesitate to call me. I look forward to following Aileen Alegria along with you.    Sincerely,    Doreen Hull MD    Enclosure  CC:  No Recipients    If you would like to receive this communication electronically, please contact [email or web address] or [phone] to request more information on [facility] access.    For providers and/or their staff who would like to refer a patient to [facility], please contact us through our one-stop-shop provider referral line, [facility], at [phone].    If you feel you have received this communication in error or would no longer like to receive these types of communications, please e-mail [email or web address]

## 2020-10-08 ENCOUNTER — TELEPHONE (OUTPATIENT)
Dept: ALLERGY | Facility: CLINIC | Age: 60
End: 2020-10-08

## 2020-10-08 NOTE — TELEPHONE ENCOUNTER
Called pharmacy gave verbal for nebulizer, left message for pt telling her that nebulizer was called in to [facility].    ----- Message from Elissa Yung sent at 10/8/2020 12:52 PM CDT -----  Regarding: Ref to nebulizer  Contact: [phone]  Pt was seen yesterday and medication was called in but pt needs the nebulizer which was not called in.  Pt uses [facility] [address] at [phone].  Please call in today.  Please call pt with update.  Or leave messasge on her voice mail.

## 2020-10-13 LAB
A ALTERNATA IGE QN: <0.1 KU/L
A FUMIGATUS IGE QN: <0.1 KU/L
BERMUDA GRASS IGE QN: <0.1 KU/L
CAT DANDER IGE QN: <0.1 KU/L
CEDAR IGE QN: <0.1 KU/L
D FARINAE IGE QN: 6.44 KU/L
D PTERONYSS IGE QN: 15.9 KU/L
DEPRECATED A ALTERNATA IGE RAST QL: NORMAL
DEPRECATED A FUMIGATUS IGE RAST QL: NORMAL
DEPRECATED BERMUDA GRASS IGE RAST QL: NORMAL
DEPRECATED CAT DANDER IGE RAST QL: NORMAL
DEPRECATED CEDAR IGE RAST QL: NORMAL
DEPRECATED D FARINAE IGE RAST QL: ABNORMAL
DEPRECATED D PTERONYSS IGE RAST QL: ABNORMAL
DEPRECATED DOG DANDER IGE RAST QL: ABNORMAL
DEPRECATED ENGL PLANTAIN IGE RAST QL: NORMAL
DEPRECATED ROACH IGE RAST QL: NORMAL
DEPRECATED TIMOTHY IGE RAST QL: NORMAL
DEPRECATED WEST RAGWEED IGE RAST QL: ABNORMAL
DEPRECATED WHITE OAK IGE RAST QL: NORMAL
DOG DANDER IGE QN: 8.38 KU/L
ENGL PLANTAIN IGE QN: <0.1 KU/L
ROACH IGE QN: <0.1 KU/L
TIMOTHY IGE QN: <0.1 KU/L
WEST RAGWEED IGE QN: 0.11 KU/L
WHITE OAK IGE QN: <0.1 KU/L

## 2020-10-23 ENCOUNTER — TELEPHONE (OUTPATIENT)
Dept: ORTHOPEDICS | Facility: CLINIC | Age: 60
End: 2020-10-23

## 2020-10-26 ENCOUNTER — TELEPHONE (OUTPATIENT)
Dept: ORTHOPEDICS | Facility: CLINIC | Age: 60
End: 2020-10-26

## 2020-10-26 DIAGNOSIS — M25.512 LEFT SHOULDER PAIN, UNSPECIFIED CHRONICITY: Primary | ICD-10-CM

## 2020-10-26 NOTE — TELEPHONE ENCOUNTER
----- Message from Cleo Obrien sent at 10/26/2020 10:31 AM CDT -----  Regarding: Pt  Reason: Pt returning call regard to xray images. Pt state you can get images from [facility] or [facility]. Asked pt was she able to get the images and faxed them over pt state the [facility] doesn't answer their phone. Please call      Communication: [phone]

## 2020-10-26 NOTE — TELEPHONE ENCOUNTER
Spoke with pt. Advised pt that if she has the xrays on a disc she could bring them otherwise we would need to order a new xray. Pt stated a new xray could be ordered. Images ordered and scheduled. All questions answered. Pt verbalized understanding.

## 2020-10-27 ENCOUNTER — OFFICE VISIT (OUTPATIENT)
Dept: ORTHOPEDICS | Facility: CLINIC | Age: 60
End: 2020-10-27
Payer: MEDICARE

## 2020-10-27 VITALS
DIASTOLIC BLOOD PRESSURE: 85 MMHG | SYSTOLIC BLOOD PRESSURE: 129 MMHG | HEART RATE: 85 BPM | HEIGHT: 64 IN | BODY MASS INDEX: 24.01 KG/M2 | WEIGHT: 140.63 LBS

## 2020-10-27 DIAGNOSIS — M75.102 ROTATOR CUFF SYNDROME, LEFT: Primary | ICD-10-CM

## 2020-10-27 PROCEDURE — 3008F PR BODY MASS INDEX (BMI) DOCUMENTED: ICD-10-PCS | Mod: CPTII,S$GLB,, | Performed by: ORTHOPAEDIC SURGERY

## 2020-10-27 PROCEDURE — 99203 PR OFFICE/OUTPT VISIT, NEW, LEVL III, 30-44 MIN: ICD-10-PCS | Mod: 25,S$GLB,, | Performed by: ORTHOPAEDIC SURGERY

## 2020-10-27 PROCEDURE — 20610 DRAIN/INJ JOINT/BURSA W/O US: CPT | Mod: LT,S$GLB,, | Performed by: ORTHOPAEDIC SURGERY

## 2020-10-27 PROCEDURE — 20610 LARGE JOINT ASPIRATION/INJECTION: L SUBACROMIAL BURSA: ICD-10-PCS | Mod: LT,S$GLB,, | Performed by: ORTHOPAEDIC SURGERY

## 2020-10-27 PROCEDURE — 99203 OFFICE O/P NEW LOW 30 MIN: CPT | Mod: 25,S$GLB,, | Performed by: ORTHOPAEDIC SURGERY

## 2020-10-27 PROCEDURE — 99999 PR PBB SHADOW E&M-EST. PATIENT-LVL V: CPT | Mod: PBBFAC,,, | Performed by: ORTHOPAEDIC SURGERY

## 2020-10-27 PROCEDURE — 99999 PR PBB SHADOW E&M-EST. PATIENT-LVL V: ICD-10-PCS | Mod: PBBFAC,,, | Performed by: ORTHOPAEDIC SURGERY

## 2020-10-27 PROCEDURE — 3008F BODY MASS INDEX DOCD: CPT | Mod: CPTII,S$GLB,, | Performed by: ORTHOPAEDIC SURGERY

## 2020-10-27 RX ORDER — TRIAMCINOLONE ACETONIDE 40 MG/ML
40 INJECTION, SUSPENSION INTRA-ARTICULAR; INTRAMUSCULAR
Status: DISCONTINUED | OUTPATIENT
Start: 2020-10-27 | End: 2020-10-27 | Stop reason: HOSPADM

## 2020-10-27 RX ADMIN — TRIAMCINOLONE ACETONIDE 40 MG: 40 INJECTION, SUSPENSION INTRA-ARTICULAR; INTRAMUSCULAR at 11:10

## 2020-10-27 NOTE — PROCEDURES
Large Joint Aspiration/Injection: L subacromial bursa    Date/Time: 10/27/2020 11:00 AM  Performed by: Terrence Luo MD  Authorized by: Terrence Luo MD     Consent Done?:  Yes (Verbal)  Indications:  Pain  Timeout: prior to procedure the correct patient, procedure, and site was verified    Prep: patient was prepped and draped in usual sterile fashion    Local anesthesia used?: No      Details:  Needle Size:  22 G  Ultrasonic Guidance for needle placement?: No    Approach:  Posterior  Location:  Shoulder  Site:  L subacromial bursa  Medications:  40 mg triamcinolone acetonide 40 mg/mL  Patient tolerance:  Patient tolerated the procedure well with no immediate complications

## 2020-10-27 NOTE — LETTER
October 27, 2020      Danisha Mobley MD  [address] [facility] [address]           [facility]  Orthopedics  [address], [facility] [address]  [facility] [address]  Phone: [phone]  Fax: [phone]          Patient: Aileen Alegria   MR Number: [id number]   YOB: 1960   Date of Visit: 10/27/2020       Dear Dr. Danisha Mobley:    Thank you for referring Aileen Alegria to me for evaluation. Attached you will find relevant portions of my assessment and plan of care.    If you have questions, please do not hesitate to call me. I look forward to following Aileen Alegria along with you.    Sincerely,    Terrence Luo MD    Enclosure  CC:  No Recipients    If you would like to receive this communication electronically, please contact [email or web address] or [phone] to request more information on [facility] access.    For providers and/or their staff who would like to refer a patient to [facility], please contact us through our one-stop-shop provider referral line, [facility], at [phone].    If you feel you have received this communication in error or would no longer like to receive these types of communications, please e-mail [email or web address]

## 2020-10-27 NOTE — PROGRESS NOTES
Subjective:    Patient ID:  Aileen Alegria is a 60 y.o. y.o. female who presents for initial visit for Pain of the Left Shoulder      59 yo female, RHD, reports 2 year h/o atraumatic left shoulder pain. Pain intermittent, localized to anteriorly, sharp and aggravated with forward and/or overhead reaching/lifting as well as reaching behind the back. She has been followed at [facility] and states she has had a left shoulder MRI which per her report showed a rotator cuff tear. She has had PT and cortisone injections in the past. Notes improvement with the latter and requests repeat cortisone injection today. She is s/p cervical spine surgery at [facility] in 2020.             Past Medical History:   Diagnosis Date    Allergy     Environmental and seasonal allergies     Hypertension         Past Surgical History:   Procedure Laterality Date     SECTION      NECK SURGERY  2020    C 2-C7       Review of patient's allergies indicates:   Allergen Reactions    Diphenhydramine hcl Anaphylaxis          Current Outpatient Medications:     albuterol (ACCUNEB) 1.25 mg/3 mL Nebu, Take 3 mLs (1.25 mg total) by nebulization every 6 (six) hours as needed. Rescue, Disp: 60 vial, Rfl: 1    cyclobenzaprine (FLEXERIL) 10 MG tablet, , Disp: , Rfl:     levocetirizine (XYZAL) 5 MG tablet, Take 2 tablets (10 mg total) by mouth 2 (two) times a day., Disp: 120 tablet, Rfl: 3    mometasone (NASONEX) 50 mcg/actuation nasal spray, 2 sprays by Nasal route 2 (two) times daily., Disp: 2 each, Rfl: 5    naproxen (NAPROSYN) 500 MG tablet, TK 1 T PO BID PRF PAIN, Disp: , Rfl:     varenicline (CHANTIX) 1 mg Tab, Take 1 tablet (1 mg total) by mouth 2 (two) times daily. After starter pack, Disp: 120 tablet, Rfl: 0    montelukast (SINGULAIR) 10 mg tablet, TK 1 T PO QD FOR ASTHMA OR ALLERGIES, Disp: , Rfl:     oxyCODONE-acetaminophen (PERCOCET) 5-325 mg per tablet, Take 1 tablet by mouth every 6 (six) hours as needed., Disp: , Rfl:      predniSONE (DELTASONE) 10 MG tablet, Take 6 tabs on day 1 Take 5 tabs on day 2 Take 4 tabs on day 3 Take 3 tabs on day 4 Take 2 tabs on day 5 Take 1 tab on day 6 Stop (Patient not taking: Reported on 10/27/2020), Disp: 21 tablet, Rfl: 0    varenicline (CHANTIX MANNY) 0.5 mg (11)- 1 mg (42) tablet, Take one 0.5mg tab by mouth once daily X3 days,then increase to one 0.5mg tab twice daily X4 days,then increase to one 1mg tab twice daily. Dispense starter pack. (Patient not taking: Reported on 10/27/2020), Disp: 1 Package, Rfl: 0    Social History     Socioeconomic History    Marital status: Single     Spouse name: Not on file    Number of children: Not on file    Years of education: Not on file    Highest education level: Not on file   Occupational History    Not on file   Social Needs    Financial resource strain: Not on file    Food insecurity     Worry: Not on file     Inability: Not on file    Transportation needs     Medical: Not on file     Non-medical: Not on file   Tobacco Use    Smoking status: Current Every Day Smoker    Smokeless tobacco: Never Used   Substance and Sexual Activity    Alcohol use: Yes     Comment: socially    Drug use: Not on file    Sexual activity: Not on file   Lifestyle    Physical activity     Days per week: 7 days     Minutes per session: Not on file    Stress: Not at all   Relationships    Social connections     Talks on phone: Not on file     Gets together: Not on file     Attends [employer] service: Not on file     Active member of club or organization: Not on file     Attends meetings of clubs or organizations: Not on file     Relationship status: Not on file   Other Topics Concern    Not on file   Social History Narrative    Not on file        Family History   Problem Relation Age of Onset    Lung cancer Mother     Dementia Father     Hypertension Father     Prostate cancer Father     Hypertension Sister     No Known Problems Brother         Review of Systems  "  Constitutional: Negative for chills and fever.   HENT: Negative for hearing loss.    Eyes: Negative for blurred vision.   Respiratory: Negative for shortness of breath.    Cardiovascular: Negative for chest pain.   Gastrointestinal: Negative for nausea and vomiting.   Genitourinary: Negative for dysuria.   Musculoskeletal: Negative for myalgias.   Skin: Negative for rash.   Neurological: Negative for speech change and loss of consciousness.   Endo/Heme/Allergies: Does not bruise/bleed easily.   Psychiatric/Behavioral: Negative for depression.        Objective:     /85 (BP Location: Right arm, Patient Position: Sitting, BP Method: Small (Automatic))   Pulse 85   Ht 5' 4.49" (1.638 m)   Wt 63.8 kg (140 lb 10.5 oz)   BMI 23.78 kg/m²     Ortho Exam     59 yo female in NAD; alert, oriented x 3; normal mood and affect    C-spine: well-healed surgical scar; NT; limited end range motion    BUE: N/V intact; no skin lesions    Left shoulder: no swelling, erythema or warmth; tender bicipital groove; NT AC joint; ROM: 120 FE/abduction; 40 ER, IR to L5; positive Neer's/Virgil's; negative Hawkin's/Speed's/Winchester's/drop arm/lift-off    Imaging:     X-rays 3-view left shoulder taken today are independently reviewed by me and show mild AC joint degenerative change; soft tissue calcifications lateral acromion; no acute bony changes evident.     Report and imaging studies of left shoulder MRI are unavailable.      Assessment & Plan:      1. Rotator cuff syndrome, left       1.  Findings, diagnosis, treatment options/risks/benefits were reviewed  2.  Left shoulder subacromial cortisone injection administered as documented; post-injection instructions reviewed  3.  PT  4.  Maintain judicious activity modification/limitation  5.  Follow-up in 3 months  "

## 2021-04-05 ENCOUNTER — PATIENT MESSAGE (OUTPATIENT)
Dept: ADMINISTRATIVE | Facility: HOSPITAL | Age: 61
End: 2021-04-05

## 2021-04-26 ENCOUNTER — PATIENT MESSAGE (OUTPATIENT)
Dept: RESEARCH | Facility: HOSPITAL | Age: 61
End: 2021-04-26

## 2021-05-26 ENCOUNTER — OFFICE VISIT (OUTPATIENT)
Dept: ALLERGY | Facility: CLINIC | Age: 61
End: 2021-05-26
Payer: COMMERCIAL

## 2021-05-26 ENCOUNTER — TELEPHONE (OUTPATIENT)
Dept: OTOLARYNGOLOGY | Facility: CLINIC | Age: 61
End: 2021-05-26

## 2021-05-26 VITALS — HEIGHT: 64 IN | BODY MASS INDEX: 23.93 KG/M2 | WEIGHT: 140.19 LBS

## 2021-05-26 DIAGNOSIS — J34.89 NASAL PAIN: ICD-10-CM

## 2021-05-26 DIAGNOSIS — J30.81 ALLERGIC RHINITIS DUE TO ANIMAL (CAT) (DOG) HAIR AND DANDER: ICD-10-CM

## 2021-05-26 DIAGNOSIS — J30.89 ALLERGIC RHINITIS DUE TO HOUSE DUST MITE: ICD-10-CM

## 2021-05-26 DIAGNOSIS — J30.9 CHRONIC ALLERGIC RHINITIS: Primary | ICD-10-CM

## 2021-05-26 DIAGNOSIS — L29.9 ITCHING: ICD-10-CM

## 2021-05-26 DIAGNOSIS — R06.02 SOB (SHORTNESS OF BREATH): ICD-10-CM

## 2021-05-26 DIAGNOSIS — H10.403 CHRONIC CONJUNCTIVITIS OF BOTH EYES, UNSPECIFIED CHRONIC CONJUNCTIVITIS TYPE: ICD-10-CM

## 2021-05-26 PROCEDURE — 3008F PR BODY MASS INDEX (BMI) DOCUMENTED: ICD-10-PCS | Mod: CPTII,S$GLB,, | Performed by: STUDENT IN AN ORGANIZED HEALTH CARE EDUCATION/TRAINING PROGRAM

## 2021-05-26 PROCEDURE — 99999 PR PBB SHADOW E&M-EST. PATIENT-LVL III: CPT | Mod: PBBFAC,,, | Performed by: STUDENT IN AN ORGANIZED HEALTH CARE EDUCATION/TRAINING PROGRAM

## 2021-05-26 PROCEDURE — 3008F BODY MASS INDEX DOCD: CPT | Mod: CPTII,S$GLB,, | Performed by: STUDENT IN AN ORGANIZED HEALTH CARE EDUCATION/TRAINING PROGRAM

## 2021-05-26 PROCEDURE — 99999 PR PBB SHADOW E&M-EST. PATIENT-LVL III: ICD-10-PCS | Mod: PBBFAC,,, | Performed by: STUDENT IN AN ORGANIZED HEALTH CARE EDUCATION/TRAINING PROGRAM

## 2021-05-26 PROCEDURE — 99215 PR OFFICE/OUTPT VISIT, EST, LEVL V, 40-54 MIN: ICD-10-PCS | Mod: S$GLB,,, | Performed by: STUDENT IN AN ORGANIZED HEALTH CARE EDUCATION/TRAINING PROGRAM

## 2021-05-26 PROCEDURE — 1125F AMNT PAIN NOTED PAIN PRSNT: CPT | Mod: S$GLB,,, | Performed by: STUDENT IN AN ORGANIZED HEALTH CARE EDUCATION/TRAINING PROGRAM

## 2021-05-26 PROCEDURE — 1125F PR PAIN SEVERITY QUANTIFIED, PAIN PRESENT: ICD-10-PCS | Mod: S$GLB,,, | Performed by: STUDENT IN AN ORGANIZED HEALTH CARE EDUCATION/TRAINING PROGRAM

## 2021-05-26 PROCEDURE — 99215 OFFICE O/P EST HI 40 MIN: CPT | Mod: S$GLB,,, | Performed by: STUDENT IN AN ORGANIZED HEALTH CARE EDUCATION/TRAINING PROGRAM

## 2021-05-26 RX ORDER — TRAMADOL HYDROCHLORIDE 50 MG/1
TABLET ORAL
COMMUNITY
Start: 2021-05-18

## 2021-05-26 RX ORDER — MELOXICAM 15 MG/1
15 TABLET ORAL DAILY
COMMUNITY
Start: 2021-05-18

## 2021-05-26 RX ORDER — FAMOTIDINE 20 MG/1
20 TABLET, FILM COATED ORAL DAILY
COMMUNITY
Start: 2021-05-18 | End: 2023-08-17 | Stop reason: SDUPTHER

## 2021-05-26 RX ORDER — MOMETASONE FUROATE 50 UG/1
2 SPRAY, METERED NASAL 2 TIMES DAILY
Qty: 2 EACH | Refills: 5 | Status: SHIPPED | OUTPATIENT
Start: 2021-05-26

## 2021-05-26 RX ORDER — METHOCARBAMOL 500 MG/1
TABLET, FILM COATED ORAL
COMMUNITY
Start: 2021-05-18

## 2021-05-26 RX ORDER — LEVOCETIRIZINE DIHYDROCHLORIDE 5 MG/1
15 TABLET, FILM COATED ORAL 2 TIMES DAILY
Qty: 360 TABLET | Refills: 5 | Status: SHIPPED | OUTPATIENT
Start: 2021-05-26 | End: 2021-06-04 | Stop reason: SDUPTHER

## 2021-05-28 ENCOUNTER — LAB VISIT (OUTPATIENT)
Dept: LAB | Facility: HOSPITAL | Age: 61
End: 2021-05-28
Attending: FAMILY MEDICINE
Payer: MEDICARE

## 2021-05-28 DIAGNOSIS — Z13.6 ENCOUNTER FOR LIPID SCREENING FOR CARDIOVASCULAR DISEASE: ICD-10-CM

## 2021-05-28 DIAGNOSIS — Z13.220 ENCOUNTER FOR LIPID SCREENING FOR CARDIOVASCULAR DISEASE: ICD-10-CM

## 2021-05-28 DIAGNOSIS — I10 ELEVATED BLOOD PRESSURE READING WITH DIAGNOSIS OF HYPERTENSION: ICD-10-CM

## 2021-05-28 DIAGNOSIS — Z86.39 PERSONAL HISTORY OF OTHER ENDOCRINE, NUTRITIONAL AND METABOLIC DISEASE: ICD-10-CM

## 2021-05-28 DIAGNOSIS — Z11.4 SCREENING FOR HIV (HUMAN IMMUNODEFICIENCY VIRUS): ICD-10-CM

## 2021-05-28 DIAGNOSIS — Z11.59 NEED FOR HEPATITIS C SCREENING TEST: ICD-10-CM

## 2021-05-28 LAB
ALBUMIN SERPL BCP-MCNC: 3.7 G/DL (ref 3.5–5.2)
ALP SERPL-CCNC: 95 U/L (ref 55–135)
ALT SERPL W/O P-5'-P-CCNC: 7 U/L (ref 10–44)
ANION GAP SERPL CALC-SCNC: 9 MMOL/L (ref 8–16)
AST SERPL-CCNC: 12 U/L (ref 10–40)
BILIRUB SERPL-MCNC: 0.6 MG/DL (ref 0.1–1)
BUN SERPL-MCNC: 8 MG/DL (ref 6–20)
CALCIUM SERPL-MCNC: 9.5 MG/DL (ref 8.7–10.5)
CHLORIDE SERPL-SCNC: 105 MMOL/L (ref 95–110)
CHOLEST SERPL-MCNC: 185 MG/DL (ref 120–199)
CHOLEST/HDLC SERPL: 4 {RATIO} (ref 2–5)
CO2 SERPL-SCNC: 27 MMOL/L (ref 23–29)
CREAT SERPL-MCNC: 0.9 MG/DL (ref 0.5–1.4)
ERYTHROCYTE [DISTWIDTH] IN BLOOD BY AUTOMATED COUNT: 14.9 % (ref 11.5–14.5)
EST. GFR  (AFRICAN AMERICAN): >60 ML/MIN/1.73 M^2
EST. GFR  (NON AFRICAN AMERICAN): >60 ML/MIN/1.73 M^2
ESTIMATED AVG GLUCOSE: 143 MG/DL (ref 68–131)
GLUCOSE SERPL-MCNC: 120 MG/DL (ref 70–110)
HBA1C MFR BLD: 6.6 % (ref 4–5.6)
HCT VFR BLD AUTO: 39.6 % (ref 37–48.5)
HCV AB SERPL QL IA: NEGATIVE
HDLC SERPL-MCNC: 46 MG/DL (ref 40–75)
HDLC SERPL: 24.9 % (ref 20–50)
HGB BLD-MCNC: 12.9 G/DL (ref 12–16)
HIV 1+2 AB+HIV1 P24 AG SERPL QL IA: NEGATIVE
LDLC SERPL CALC-MCNC: 122.8 MG/DL (ref 63–159)
MCH RBC QN AUTO: 29.3 PG (ref 27–31)
MCHC RBC AUTO-ENTMCNC: 32.6 G/DL (ref 32–36)
MCV RBC AUTO: 90 FL (ref 82–98)
NONHDLC SERPL-MCNC: 139 MG/DL
PLATELET # BLD AUTO: 187 K/UL (ref 150–450)
PMV BLD AUTO: 13 FL (ref 9.2–12.9)
POTASSIUM SERPL-SCNC: 3.7 MMOL/L (ref 3.5–5.1)
PROT SERPL-MCNC: 6.9 G/DL (ref 6–8.4)
RBC # BLD AUTO: 4.41 M/UL (ref 4–5.4)
SODIUM SERPL-SCNC: 141 MMOL/L (ref 136–145)
TRIGL SERPL-MCNC: 81 MG/DL (ref 30–150)
TSH SERPL DL<=0.005 MIU/L-ACNC: 1.38 UIU/ML (ref 0.4–4)
WBC # BLD AUTO: 8.27 K/UL (ref 3.9–12.7)

## 2021-05-28 PROCEDURE — 83036 HEMOGLOBIN GLYCOSYLATED A1C: CPT | Performed by: FAMILY MEDICINE

## 2021-05-28 PROCEDURE — 80061 LIPID PANEL: CPT | Performed by: FAMILY MEDICINE

## 2021-05-28 PROCEDURE — 84443 ASSAY THYROID STIM HORMONE: CPT | Performed by: FAMILY MEDICINE

## 2021-05-28 PROCEDURE — 36415 COLL VENOUS BLD VENIPUNCTURE: CPT | Mod: PN | Performed by: FAMILY MEDICINE

## 2021-05-28 PROCEDURE — 86703 HIV-1/HIV-2 1 RESULT ANTBDY: CPT | Performed by: FAMILY MEDICINE

## 2021-05-28 PROCEDURE — 80053 COMPREHEN METABOLIC PANEL: CPT | Performed by: FAMILY MEDICINE

## 2021-05-28 PROCEDURE — 85027 COMPLETE CBC AUTOMATED: CPT | Performed by: FAMILY MEDICINE

## 2021-05-28 PROCEDURE — 86803 HEPATITIS C AB TEST: CPT | Performed by: FAMILY MEDICINE

## 2021-06-01 PROBLEM — E11.9 TYPE 2 DIABETES MELLITUS WITHOUT COMPLICATION, WITHOUT LONG-TERM CURRENT USE OF INSULIN: Status: ACTIVE | Noted: 2021-06-01

## 2021-06-01 PROBLEM — E78.5 HYPERLIPIDEMIA LDL GOAL <100: Status: ACTIVE | Noted: 2021-06-01

## 2021-06-02 ENCOUNTER — TELEPHONE (OUTPATIENT)
Dept: PRIMARY CARE CLINIC | Facility: CLINIC | Age: 61
End: 2021-06-02

## 2021-06-03 DIAGNOSIS — R07.89 CHEST TIGHTNESS: ICD-10-CM

## 2021-06-03 RX ORDER — ALBUTEROL SULFATE 1.25 MG/3ML
SOLUTION RESPIRATORY (INHALATION)
Qty: 225 ML | Refills: 1 | Status: SHIPPED | OUTPATIENT
Start: 2021-06-03 | End: 2021-08-11

## 2021-06-04 ENCOUNTER — IMMUNIZATION (OUTPATIENT)
Dept: PHARMACY | Facility: CLINIC | Age: 61
End: 2021-06-04
Payer: COMMERCIAL

## 2021-06-04 ENCOUNTER — OFFICE VISIT (OUTPATIENT)
Dept: PRIMARY CARE CLINIC | Facility: CLINIC | Age: 61
End: 2021-06-04
Payer: MEDICARE

## 2021-06-04 ENCOUNTER — IMMUNIZATION (OUTPATIENT)
Dept: PHARMACY | Facility: CLINIC | Age: 61
End: 2021-06-04

## 2021-06-04 ENCOUNTER — TELEPHONE (OUTPATIENT)
Dept: DIABETES | Facility: CLINIC | Age: 61
End: 2021-06-04

## 2021-06-04 VITALS
HEIGHT: 64 IN | BODY MASS INDEX: 23.78 KG/M2 | OXYGEN SATURATION: 98 % | WEIGHT: 139.31 LBS | HEART RATE: 85 BPM | TEMPERATURE: 98 F | RESPIRATION RATE: 18 BRPM | SYSTOLIC BLOOD PRESSURE: 127 MMHG | DIASTOLIC BLOOD PRESSURE: 67 MMHG

## 2021-06-04 DIAGNOSIS — M25.511 CHRONIC RIGHT SHOULDER PAIN: ICD-10-CM

## 2021-06-04 DIAGNOSIS — F17.210 CIGARETTE NICOTINE DEPENDENCE WITHOUT COMPLICATION: ICD-10-CM

## 2021-06-04 DIAGNOSIS — G89.29 CHRONIC RIGHT SHOULDER PAIN: ICD-10-CM

## 2021-06-04 DIAGNOSIS — Z91.89 10 YEAR RISK OF MI OR STROKE 7.5% OR GREATER: ICD-10-CM

## 2021-06-04 DIAGNOSIS — Z12.4 ENCOUNTER FOR PAP SMEAR OF CERVIX WITH HPV DNA COTESTING: ICD-10-CM

## 2021-06-04 DIAGNOSIS — Z12.31 SCREENING MAMMOGRAM, ENCOUNTER FOR: ICD-10-CM

## 2021-06-04 DIAGNOSIS — Z98.890 H/O CERVICAL SPINE SURGERY: ICD-10-CM

## 2021-06-04 DIAGNOSIS — J40 BRONCHITIS: ICD-10-CM

## 2021-06-04 DIAGNOSIS — Z00.00 ANNUAL PHYSICAL EXAM: Primary | ICD-10-CM

## 2021-06-04 DIAGNOSIS — M75.102 ROTATOR CUFF TEAR ARTHROPATHY OF LEFT SHOULDER: ICD-10-CM

## 2021-06-04 DIAGNOSIS — Z71.6 ENCOUNTER FOR SMOKING CESSATION COUNSELING: ICD-10-CM

## 2021-06-04 DIAGNOSIS — E11.9 ENCOUNTER FOR DIABETIC FOOT EXAM: ICD-10-CM

## 2021-06-04 DIAGNOSIS — J30.89 ALLERGIC RHINITIS DUE TO HOUSE DUST MITE: ICD-10-CM

## 2021-06-04 DIAGNOSIS — Z78.0 POSTMENOPAUSAL: ICD-10-CM

## 2021-06-04 DIAGNOSIS — E78.5 HYPERLIPIDEMIA LDL GOAL <100: ICD-10-CM

## 2021-06-04 DIAGNOSIS — E11.9 TYPE 2 DIABETES MELLITUS WITHOUT COMPLICATION, WITHOUT LONG-TERM CURRENT USE OF INSULIN: ICD-10-CM

## 2021-06-04 DIAGNOSIS — J30.89 ENVIRONMENTAL AND SEASONAL ALLERGIES: ICD-10-CM

## 2021-06-04 DIAGNOSIS — M12.812 ROTATOR CUFF TEAR ARTHROPATHY OF LEFT SHOULDER: ICD-10-CM

## 2021-06-04 PROBLEM — I10 ELEVATED BLOOD PRESSURE READING WITH DIAGNOSIS OF HYPERTENSION: Status: RESOLVED | Noted: 2020-09-30 | Resolved: 2021-06-04

## 2021-06-04 PROBLEM — M25.512 CHRONIC LEFT SHOULDER PAIN: Status: RESOLVED | Noted: 2019-01-02 | Resolved: 2021-06-04

## 2021-06-04 LAB
ALBUMIN/CREAT UR: 6.4 UG/MG (ref 0–30)
CREAT UR-MCNC: 326 MG/DL (ref 15–325)
MICROALBUMIN UR DL<=1MG/L-MCNC: 21 UG/ML

## 2021-06-04 PROCEDURE — 99499 UNLISTED E&M SERVICE: CPT | Mod: S$GLB,,, | Performed by: FAMILY MEDICINE

## 2021-06-04 PROCEDURE — 82043 UR ALBUMIN QUANTITATIVE: CPT | Performed by: FAMILY MEDICINE

## 2021-06-04 PROCEDURE — 99407 BEHAV CHNG SMOKING > 10 MIN: CPT | Mod: S$GLB,,, | Performed by: FAMILY MEDICINE

## 2021-06-04 PROCEDURE — 99396 PREV VISIT EST AGE 40-64: CPT | Mod: 25,GY,S$GLB, | Performed by: FAMILY MEDICINE

## 2021-06-04 PROCEDURE — 99999 PR PBB SHADOW E&M-EST. PATIENT-LVL V: ICD-10-PCS | Mod: PBBFAC,,, | Performed by: FAMILY MEDICINE

## 2021-06-04 PROCEDURE — 88175 CYTOPATH C/V AUTO FLUID REDO: CPT | Performed by: FAMILY MEDICINE

## 2021-06-04 PROCEDURE — 82570 ASSAY OF URINE CREATININE: CPT | Performed by: FAMILY MEDICINE

## 2021-06-04 PROCEDURE — 3008F BODY MASS INDEX DOCD: CPT | Mod: CPTII,S$GLB,, | Performed by: FAMILY MEDICINE

## 2021-06-04 PROCEDURE — 99999 PR PBB SHADOW E&M-EST. PATIENT-LVL V: CPT | Mod: PBBFAC,,, | Performed by: FAMILY MEDICINE

## 2021-06-04 PROCEDURE — 1125F AMNT PAIN NOTED PAIN PRSNT: CPT | Mod: S$GLB,,, | Performed by: FAMILY MEDICINE

## 2021-06-04 PROCEDURE — 3008F PR BODY MASS INDEX (BMI) DOCUMENTED: ICD-10-PCS | Mod: CPTII,S$GLB,, | Performed by: FAMILY MEDICINE

## 2021-06-04 PROCEDURE — 99499 RISK ADDL DX/OHS AUDIT: ICD-10-PCS | Mod: S$GLB,,, | Performed by: FAMILY MEDICINE

## 2021-06-04 PROCEDURE — 99396 PR PREVENTIVE VISIT,EST,40-64: ICD-10-PCS | Mod: 25,GY,S$GLB, | Performed by: FAMILY MEDICINE

## 2021-06-04 PROCEDURE — 87624 HPV HI-RISK TYP POOLED RSLT: CPT | Performed by: FAMILY MEDICINE

## 2021-06-04 PROCEDURE — 99407 PR TOBACCO USE CESSATION INTENSIVE >10 MINUTES: ICD-10-PCS | Mod: S$GLB,,, | Performed by: FAMILY MEDICINE

## 2021-06-04 PROCEDURE — 1125F PR PAIN SEVERITY QUANTIFIED, PAIN PRESENT: ICD-10-PCS | Mod: S$GLB,,, | Performed by: FAMILY MEDICINE

## 2021-06-04 RX ORDER — LEVOCETIRIZINE DIHYDROCHLORIDE 5 MG/1
5 TABLET, FILM COATED ORAL 2 TIMES DAILY
Qty: 180 TABLET | Refills: 3 | Status: SHIPPED | OUTPATIENT
Start: 2021-06-04 | End: 2022-01-19 | Stop reason: DRUGHIGH

## 2021-06-04 RX ORDER — BUPROPION HYDROCHLORIDE 150 MG/1
TABLET, EXTENDED RELEASE ORAL
Qty: 177 TABLET | Refills: 0 | Status: SHIPPED | OUTPATIENT
Start: 2021-06-04 | End: 2023-09-29

## 2021-06-04 RX ORDER — ASPIRIN 81 MG/1
81 TABLET ORAL DAILY
Qty: 90 TABLET | Refills: 3 | Status: SHIPPED | OUTPATIENT
Start: 2021-06-04 | End: 2023-09-29

## 2021-06-04 RX ORDER — ATORVASTATIN CALCIUM 40 MG/1
40 TABLET, FILM COATED ORAL NIGHTLY
Qty: 90 TABLET | Refills: 3 | Status: SHIPPED | OUTPATIENT
Start: 2021-06-04 | End: 2022-06-07

## 2021-06-10 LAB
HPV HR 12 DNA SPEC QL NAA+PROBE: NEGATIVE
HPV16 AG SPEC QL: NEGATIVE
HPV18 DNA SPEC QL NAA+PROBE: NEGATIVE

## 2021-06-11 ENCOUNTER — TELEPHONE (OUTPATIENT)
Dept: PRIMARY CARE CLINIC | Facility: CLINIC | Age: 61
End: 2021-06-11

## 2021-06-14 ENCOUNTER — OFFICE VISIT (OUTPATIENT)
Dept: PRIMARY CARE CLINIC | Facility: CLINIC | Age: 61
End: 2021-06-14
Payer: COMMERCIAL

## 2021-06-14 VITALS
OXYGEN SATURATION: 98 % | WEIGHT: 141.56 LBS | TEMPERATURE: 97 F | SYSTOLIC BLOOD PRESSURE: 130 MMHG | HEIGHT: 64 IN | HEART RATE: 78 BPM | RESPIRATION RATE: 16 BRPM | DIASTOLIC BLOOD PRESSURE: 80 MMHG | BODY MASS INDEX: 24.17 KG/M2

## 2021-06-14 DIAGNOSIS — F17.210 CIGARETTE NICOTINE DEPENDENCE WITHOUT COMPLICATION: ICD-10-CM

## 2021-06-14 DIAGNOSIS — E78.5 HYPERLIPIDEMIA LDL GOAL <100: Primary | ICD-10-CM

## 2021-06-14 DIAGNOSIS — E11.9 TYPE 2 DIABETES MELLITUS WITHOUT COMPLICATION, WITHOUT LONG-TERM CURRENT USE OF INSULIN: ICD-10-CM

## 2021-06-14 PROCEDURE — 3079F PR MOST RECENT DIASTOLIC BLOOD PRESSURE 80-89 MM HG: ICD-10-PCS | Mod: CPTII,S$GLB,, | Performed by: NURSE PRACTITIONER

## 2021-06-14 PROCEDURE — 3075F SYST BP GE 130 - 139MM HG: CPT | Mod: CPTII,S$GLB,, | Performed by: NURSE PRACTITIONER

## 2021-06-14 PROCEDURE — 1125F PR PAIN SEVERITY QUANTIFIED, PAIN PRESENT: ICD-10-PCS | Mod: S$GLB,,, | Performed by: NURSE PRACTITIONER

## 2021-06-14 PROCEDURE — 99215 PR OFFICE/OUTPT VISIT, EST, LEVL V, 40-54 MIN: ICD-10-PCS | Mod: S$GLB,,, | Performed by: NURSE PRACTITIONER

## 2021-06-14 PROCEDURE — 99499 UNLISTED E&M SERVICE: CPT | Mod: S$GLB,,, | Performed by: NURSE PRACTITIONER

## 2021-06-14 PROCEDURE — 99999 PR PBB SHADOW E&M-EST. PATIENT-LVL V: CPT | Mod: PBBFAC,,, | Performed by: NURSE PRACTITIONER

## 2021-06-14 PROCEDURE — 99499 RISK ADDL DX/OHS AUDIT: ICD-10-PCS | Mod: S$GLB,,, | Performed by: NURSE PRACTITIONER

## 2021-06-14 PROCEDURE — 3008F BODY MASS INDEX DOCD: CPT | Mod: CPTII,S$GLB,, | Performed by: NURSE PRACTITIONER

## 2021-06-14 PROCEDURE — 1125F AMNT PAIN NOTED PAIN PRSNT: CPT | Mod: S$GLB,,, | Performed by: NURSE PRACTITIONER

## 2021-06-14 PROCEDURE — 3075F PR MOST RECENT SYSTOLIC BLOOD PRESS GE 130-139MM HG: ICD-10-PCS | Mod: CPTII,S$GLB,, | Performed by: NURSE PRACTITIONER

## 2021-06-14 PROCEDURE — 99999 PR PBB SHADOW E&M-EST. PATIENT-LVL V: ICD-10-PCS | Mod: PBBFAC,,, | Performed by: NURSE PRACTITIONER

## 2021-06-14 PROCEDURE — 99215 OFFICE O/P EST HI 40 MIN: CPT | Mod: S$GLB,,, | Performed by: NURSE PRACTITIONER

## 2021-06-14 PROCEDURE — 3008F PR BODY MASS INDEX (BMI) DOCUMENTED: ICD-10-PCS | Mod: CPTII,S$GLB,, | Performed by: NURSE PRACTITIONER

## 2021-06-14 PROCEDURE — 3079F DIAST BP 80-89 MM HG: CPT | Mod: CPTII,S$GLB,, | Performed by: NURSE PRACTITIONER

## 2021-06-22 ENCOUNTER — PATIENT OUTREACH (OUTPATIENT)
Dept: ADMINISTRATIVE | Facility: OTHER | Age: 61
End: 2021-06-22

## 2021-07-06 ENCOUNTER — TELEPHONE (OUTPATIENT)
Dept: OTOLARYNGOLOGY | Facility: CLINIC | Age: 61
End: 2021-07-06

## 2021-07-07 ENCOUNTER — PATIENT MESSAGE (OUTPATIENT)
Dept: ADMINISTRATIVE | Facility: HOSPITAL | Age: 61
End: 2021-07-07

## 2021-09-10 ENCOUNTER — TELEPHONE (OUTPATIENT)
Dept: PRIMARY CARE CLINIC | Facility: CLINIC | Age: 61
End: 2021-09-10

## 2021-09-30 ENCOUNTER — HOSPITAL ENCOUNTER (OUTPATIENT)
Dept: RADIOLOGY | Facility: HOSPITAL | Age: 61
Discharge: HOME | End: 2021-09-30
Attending: FAMILY MEDICINE
Payer: MEDICARE

## 2021-09-30 VITALS — HEIGHT: 64 IN | BODY MASS INDEX: 26.29 KG/M2 | WEIGHT: 154 LBS

## 2021-09-30 DIAGNOSIS — Z12.31 SCREENING MAMMOGRAM, ENCOUNTER FOR: ICD-10-CM

## 2021-09-30 PROCEDURE — 77067 SCR MAMMO BI INCL CAD: CPT | Mod: TC,PN

## 2021-09-30 PROCEDURE — 77063 MAMMO DIGITAL SCREENING BILAT WITH TOMO: ICD-10-PCS | Mod: 26,,, | Performed by: RADIOLOGY

## 2021-09-30 PROCEDURE — 77063 BREAST TOMOSYNTHESIS BI: CPT | Mod: 26,,, | Performed by: RADIOLOGY

## 2021-09-30 PROCEDURE — 77067 MAMMO DIGITAL SCREENING BILAT WITH TOMO: ICD-10-PCS | Mod: 26,,, | Performed by: RADIOLOGY

## 2021-09-30 PROCEDURE — 77067 SCR MAMMO BI INCL CAD: CPT | Mod: 26,,, | Performed by: RADIOLOGY

## 2021-10-05 ENCOUNTER — PATIENT MESSAGE (OUTPATIENT)
Dept: ADMINISTRATIVE | Facility: HOSPITAL | Age: 61
End: 2021-10-05

## 2021-10-06 ENCOUNTER — TELEPHONE (OUTPATIENT)
Dept: RADIOLOGY | Facility: HOSPITAL | Age: 61
End: 2021-10-06

## 2021-10-06 ENCOUNTER — PATIENT MESSAGE (OUTPATIENT)
Dept: RADIOLOGY | Facility: HOSPITAL | Age: 61
End: 2021-10-06

## 2021-10-08 ENCOUNTER — HOSPITAL ENCOUNTER (OUTPATIENT)
Dept: RADIOLOGY | Facility: HOSPITAL | Age: 61
Discharge: HOME | End: 2021-10-08
Attending: FAMILY MEDICINE
Payer: MEDICARE

## 2021-10-08 DIAGNOSIS — R92.8 ABNORMAL FINDING ON BREAST IMAGING: ICD-10-CM

## 2021-10-08 PROCEDURE — 77061 MAMMO DIGITAL DIAGNOSTIC LEFT WITH TOMO: ICD-10-PCS | Mod: 26,LT,, | Performed by: RADIOLOGY

## 2021-10-08 PROCEDURE — 77061 BREAST TOMOSYNTHESIS UNI: CPT | Mod: TC,LT

## 2021-10-08 PROCEDURE — 77061 BREAST TOMOSYNTHESIS UNI: CPT | Mod: 26,LT,, | Performed by: RADIOLOGY

## 2021-10-08 PROCEDURE — 77065 DX MAMMO INCL CAD UNI: CPT | Mod: 26,LT,, | Performed by: RADIOLOGY

## 2021-10-08 PROCEDURE — 77065 MAMMO DIGITAL DIAGNOSTIC LEFT WITH TOMO: ICD-10-PCS | Mod: 26,LT,, | Performed by: RADIOLOGY

## 2021-10-20 ENCOUNTER — TELEPHONE (OUTPATIENT)
Dept: ADMINISTRATIVE | Facility: HOSPITAL | Age: 61
End: 2021-10-20

## 2021-11-30 ENCOUNTER — PATIENT OUTREACH (OUTPATIENT)
Dept: ADMINISTRATIVE | Facility: HOSPITAL | Age: 61
End: 2021-11-30
Payer: MEDICARE

## 2022-01-19 ENCOUNTER — TELEPHONE (OUTPATIENT)
Dept: ALLERGY | Facility: CLINIC | Age: 62
End: 2022-01-19

## 2022-01-19 ENCOUNTER — OFFICE VISIT (OUTPATIENT)
Dept: ALLERGY | Facility: CLINIC | Age: 62
End: 2022-01-19
Payer: MEDICARE

## 2022-01-19 ENCOUNTER — PATIENT MESSAGE (OUTPATIENT)
Dept: ADMINISTRATIVE | Facility: HOSPITAL | Age: 62
End: 2022-01-19
Payer: MEDICARE

## 2022-01-19 ENCOUNTER — PATIENT OUTREACH (OUTPATIENT)
Dept: ADMINISTRATIVE | Facility: OTHER | Age: 62
End: 2022-01-19
Payer: MEDICARE

## 2022-01-19 VITALS — HEIGHT: 64 IN | BODY MASS INDEX: 24.84 KG/M2 | WEIGHT: 145.5 LBS

## 2022-01-19 DIAGNOSIS — F17.200 SMOKING: ICD-10-CM

## 2022-01-19 DIAGNOSIS — Z77.22 PASSIVE SMOKE EXPOSURE: ICD-10-CM

## 2022-01-19 DIAGNOSIS — J45.901 EXACERBATION OF ASTHMA, UNSPECIFIED ASTHMA SEVERITY, UNSPECIFIED WHETHER PERSISTENT: Primary | ICD-10-CM

## 2022-01-19 DIAGNOSIS — J30.81 ALLERGIC RHINITIS DUE TO ANIMAL (CAT) (DOG) HAIR AND DANDER: ICD-10-CM

## 2022-01-19 DIAGNOSIS — J30.9 CHRONIC ALLERGIC RHINITIS: ICD-10-CM

## 2022-01-19 DIAGNOSIS — E11.9 TYPE 2 DIABETES MELLITUS WITHOUT COMPLICATION, UNSPECIFIED WHETHER LONG TERM INSULIN USE: Primary | ICD-10-CM

## 2022-01-19 DIAGNOSIS — I10 HYPERTENSION, UNSPECIFIED TYPE: ICD-10-CM

## 2022-01-19 DIAGNOSIS — J30.89 ALLERGIC RHINITIS DUE TO HOUSE DUST MITE: ICD-10-CM

## 2022-01-19 DIAGNOSIS — J45.909 ASTHMA, UNSPECIFIED ASTHMA SEVERITY, UNSPECIFIED WHETHER COMPLICATED, UNSPECIFIED WHETHER PERSISTENT: ICD-10-CM

## 2022-01-19 PROCEDURE — 3008F PR BODY MASS INDEX (BMI) DOCUMENTED: ICD-10-PCS | Mod: CPTII,S$GLB,, | Performed by: STUDENT IN AN ORGANIZED HEALTH CARE EDUCATION/TRAINING PROGRAM

## 2022-01-19 PROCEDURE — 3008F BODY MASS INDEX DOCD: CPT | Mod: CPTII,S$GLB,, | Performed by: STUDENT IN AN ORGANIZED HEALTH CARE EDUCATION/TRAINING PROGRAM

## 2022-01-19 PROCEDURE — 99215 PR OFFICE/OUTPT VISIT, EST, LEVL V, 40-54 MIN: ICD-10-PCS | Mod: S$GLB,,, | Performed by: STUDENT IN AN ORGANIZED HEALTH CARE EDUCATION/TRAINING PROGRAM

## 2022-01-19 PROCEDURE — 1159F MED LIST DOCD IN RCRD: CPT | Mod: CPTII,S$GLB,, | Performed by: STUDENT IN AN ORGANIZED HEALTH CARE EDUCATION/TRAINING PROGRAM

## 2022-01-19 PROCEDURE — 99215 OFFICE O/P EST HI 40 MIN: CPT | Mod: S$GLB,,, | Performed by: STUDENT IN AN ORGANIZED HEALTH CARE EDUCATION/TRAINING PROGRAM

## 2022-01-19 PROCEDURE — 99999 PR PBB SHADOW E&M-EST. PATIENT-LVL III: CPT | Mod: PBBFAC,,, | Performed by: STUDENT IN AN ORGANIZED HEALTH CARE EDUCATION/TRAINING PROGRAM

## 2022-01-19 PROCEDURE — 1159F PR MEDICATION LIST DOCUMENTED IN MEDICAL RECORD: ICD-10-PCS | Mod: CPTII,S$GLB,, | Performed by: STUDENT IN AN ORGANIZED HEALTH CARE EDUCATION/TRAINING PROGRAM

## 2022-01-19 PROCEDURE — 99999 PR PBB SHADOW E&M-EST. PATIENT-LVL III: ICD-10-PCS | Mod: PBBFAC,,, | Performed by: STUDENT IN AN ORGANIZED HEALTH CARE EDUCATION/TRAINING PROGRAM

## 2022-01-19 RX ORDER — LEVOCETIRIZINE DIHYDROCHLORIDE 5 MG/1
5 TABLET, FILM COATED ORAL 2 TIMES DAILY
Qty: 90 TABLET | Refills: 3 | Status: SHIPPED | OUTPATIENT
Start: 2022-01-19 | End: 2022-04-08 | Stop reason: SDUPTHER

## 2022-01-19 RX ORDER — BUDESONIDE AND FORMOTEROL FUMARATE DIHYDRATE 160; 4.5 UG/1; UG/1
2 AEROSOL RESPIRATORY (INHALATION) 2 TIMES DAILY PRN
Qty: 20.4 G | Refills: 1 | Status: SHIPPED | OUTPATIENT
Start: 2022-01-19 | End: 2023-02-08

## 2022-01-19 RX ORDER — PREDNISONE 20 MG/1
TABLET ORAL
Qty: 18 TABLET | Refills: 0 | Status: SHIPPED | OUTPATIENT
Start: 2022-01-19 | End: 2022-01-28

## 2022-01-19 NOTE — PROGRESS NOTES
"Allergy Clinic Note  [facility]    This note was created by combination of typed  and M-Modal dictation. Transcription errors may be present.  If there are any questions, please contact me.      Subjective:      Patient ID: Aileen Alegria is a 61 y.o. female.    Chief Complaint: Allergies, Asthma, Follow-up, Itching, Nasal Congestion, and Cough      Allergy problem list:     Chronic allergic rhinitis: dust mite, pet dog  Chest symptoms  Frequent sinusitis  Smoking  (Hypertension)    History of Present Illness:  60-year-old female with chronic rhinitis, chest symptoms, and itching  was last seen 5/26/21 for chest symptoms.  She is here today complaining of a 2-3 week history of both head and chest symptoms.  The history is difficult.    Related medications  --   This is from EMR.  Albuterol nebs -- >12 per day, 4 at a time  Singulair -- no taking  Nasonex -- not taking  Xyzal  -- not taking  (Pepcid) -- not sure      1/19/22:    Client says that for the last 2-3 weeks she has been experiencing both head and chest symptoms.  The head symptoms consist of a right-sided headache, itchy throat, itchy right ear, sore nostrils, runny nose, sneezing, soreness on the bridge of her nose extending toward her maxillary sinuses, and copious white material from her nose.  She denies fever or purulence.  She also denies ill exposures.      Over the same time.  She has been experiencing chest "itching" and tightness.  This is associated with severe shortness of breath she also admits to wheezing and coughing.  Symptoms are severe in interfere with her ADLs such as house cleaning and also interrupt her sleep.  She says she obtains complete relief with 1-4 albuterol ampules.  This relief last 4-5 hours.  She does awaken at night due to shortness of breath every night she is using at least 12 ampules of albuterol by neb per day.  She is not taking any other medications.    She reports her last asthma exacerbation " "was 2-3 months ago.      5/26/2021:  She reported that her breathing had been bad for the prior two months.  She is unable to tell me whether she is referring to breathing in her chest, breathing in her nose, or both.  The symptoms she can define are fatigue, itching of eyes and nose, headache and sore nose.  She also reports "grasping" but is unable to define further.  She reports she was blowing albuterol from her son's nebulizer into her nose but stopped due to drying sensation.  Did not know what medications she was taking.  She was referred to ENT, given her own nebulizer, increased her Xyzal dose to control the itching.    10/8/2020:  At her initial visit  she reported a long history of rhinitis.  Her chief complaint was bilateral ear itching which kept her awake at night.  Other symptoms include complete nasal blockage at times, nasal congestion, runny nose, postnasal drip sensation, sneezing, itchy eyes, red eyes and throat clearing.  Symptoms are severe and interfere with her ability to function.  She says she often takes naps in order to avoid feeling these symptoms.  This is despite very frequent use of antihistamines and nasal steroids on a daily basis.  Precipitants include smoke and fresh cut grass.  Symptoms are sometimes worse outside.  They are perennial without a noticeable variation.  When symptoms are severe she often gets sinus infections.  These are manifest primarily with tenderness of her forehead and complete nasal blockage.  She has had no workup to date.  She admits an improvement in her symptoms (but not complete resolution) when she was taking steroids for torn rotator cuff earlier this year.  She had no steroid side effects.    For the previous 6 months (since about April 2020) she had also been experiencing chest symptoms. She describes an itching feeling in the center of her chest.  This is associated with chest tightness, shortness of breath, wheezing, and coughing.  At times the " shortness of breath is severe.  She has been helped by a relative's albuterol by nebulizer.  These severe shortness of breath episodes have occurred twice in the last 6 months.  There are no clear precipitants.  She has had no workup.    Additional History:  Interval history is significant for new Dx of diabetes. Past medical history is significant for smoking and hypertension.  No Hx of ENT surgery but did have neck surgery in 2020.  Family history is significant asthma and allergies in her children.  Client  reports that she has been smoking cigarettes. She has never used smokeless tobacco.  Exposures are notable for a pet dog and frequent house cleaning.  She is also exposed to both passive and active smoke.      Patient Active Problem List   Diagnosis    Decreased range of motion of left shoulder    Decreased strength    Cigarette nicotine dependence without complication    Rotator cuff tear arthropathy of left shoulder    Environmental and seasonal allergies    H/O cervical spine surgery    Type 2 diabetes mellitus without complication, without long-term current use of insulin    Hyperlipidemia LDL goal <100    Allergic rhinitis due to house dust mite    Bronchitis    Chronic right shoulder pain    10 year risk of MI or stroke 7.5% or greater    Encounter for smoking cessation counseling     Current Outpatient Medications on File Prior to Visit   Medication Sig Dispense Refill    albuterol (ACCUNEB) 1.25 mg/3 mL Nebu USE 1 VIAL PER NEBULIZER EVERY 6 HOURS AS NEEDED 225 mL 1    aspirin (ECOTRIN) 81 MG EC tablet Take 1 tablet (81 mg total) by mouth once daily. 90 tablet 3    cyclobenzaprine (FLEXERIL) 10 MG tablet       meloxicam (MOBIC) 15 MG tablet Take 15 mg by mouth once daily.      atorvastatin (LIPITOR) 40 MG tablet Take 1 tablet (40 mg total) by mouth every evening. (Patient not taking: Reported on 1/19/2022) 90 tablet 3    buPROPion (WELLBUTRIN SR) 150 MG TBSR 12 hr tablet Take 1 tablet  "(150 mg total) by mouth once daily for 3 days, THEN 1 tablet (150 mg total) 2 (two) times daily. 177 tablet 0    famotidine (PEPCID) 20 MG tablet Take 20 mg by mouth once daily.      methocarbamoL (ROBAXIN) 500 MG Tab SMARTSI Tablet(s) By Mouth Every 12 Hours      mometasone (NASONEX) 50 mcg/actuation nasal spray 2 sprays by Nasal route 2 (two) times daily. (Patient not taking: Reported on 2022) 2 each 5    montelukast (SINGULAIR) 10 mg tablet TK 1 T PO QD FOR ASTHMA OR ALLERGIES      naproxen (NAPROSYN) 500 MG tablet TK 1 T PO BID PRF PAIN      traMADoL (ULTRAM) 50 mg tablet SMARTSI Tablet(s) By Mouth Every 12 Hours PRN      [DISCONTINUED] levocetirizine (XYZAL) 5 MG tablet Take 1 tablet (5 mg total) by mouth 2 (two) times a day. (Patient not taking: Reported on 2022) 180 tablet 3     No current facility-administered medications on file prior to visit.         Review of Systems   Constitutional: Negative for chills and fever.   HENT: Positive for congestion. Negative for ear discharge and nosebleeds.         Runny nose, ear itching,  Copious nasal discharge   Eyes: Negative for discharge and redness.   Respiratory: Positive for cough, shortness of breath and wheezing. Negative for hemoptysis, sputum production and stridor.         "Chest itching"   Cardiovascular: Positive for chest pain. Negative for palpitations.   Gastrointestinal: Negative for blood in stool, melena and vomiting.   Genitourinary: Negative for flank pain and hematuria.   Skin: Negative for itching and rash.   Neurological: Negative for seizures and loss of consciousness.   Endo/Heme/Allergies: Negative for polydipsia. Does not bruise/bleed easily.       Objective:   Ht 5' 4.02" (1.626 m)   Wt 66 kg (145 lb 8.1 oz)   BMI 24.96 kg/m²       Physical Exam  Constitutional:       Comments: Note:  2.5 hours after albuterol   HENT:      Head: Normocephalic and atraumatic.      Comments: Tympanic membranes are clear bilaterally.  " Nares are pink with Moderatet turbinate swelling.  Oropharynx benign without exudate.  Tongue is not coated.     Nose: No rhinorrhea.   Eyes:      General: No scleral icterus.     Conjunctiva/sclera: Conjunctivae normal.   Cardiovascular:      Rate and Rhythm: Normal rate and regular rhythm.      Pulses: Normal pulses.      Heart sounds: Normal heart sounds.   Pulmonary:      Effort: Pulmonary effort is normal. No respiratory distress.      Breath sounds: Normal breath sounds. No stridor. No wheezing or rales.      Comments: No obvious respiratory distress.  Speaking in short sentences.  No accessory muscle use.  A through G in a single breath.  Lungs clear.  Abdominal:      General: There is no distension.   Musculoskeletal:         General: No deformity.      Cervical back: Neck supple.      Right lower leg: No edema.      Left lower leg: No edema.   Lymphadenopathy:      Cervical: Cervical adenopathy present.   Skin:     Findings: No erythema or rash.   Neurological:      General: No focal deficit present.      Mental Status: She is alert.   Psychiatric:         Mood and Affect: Affect normal.         Cognition and Memory: Memory normal.         Data:   Aeroallergen testing by the Immunocap method (10/07/2020) was positive for dust mite and dog.   Class III   dust mite (Df and Dp), dog  All other allergens less than 0.35 KU/L. Ragweed 0.11  Total serum IgE 74    Eosinophil count 500 on CBC of 8/03/2020    Assessment:     1. Exacerbation of asthma, unspecified asthma severity, unspecified whether persistent    2. Chronic allergic rhinitis    3. Allergic rhinitis due to house dust mite    4. Allergic rhinitis due to animal (cat) (dog) hair and dander    5. Smoking    6. Passive smoke exposure    7. Asthma, unspecified asthma severity, unspecified whether complicated, unspecified whether persistent    8. Hypertension, unspecified type        Plan:     Medical decision making:   Client is it presenting with a 2 to 3  week history of asthma exacerbation refractory to beta agonists.  Her physical exam at the moment is fairly unremarkable but this is shortly after taking albuterol.    I feel she needs systemic steroids but does not need a higher level of care.  Therapeutically, I am prescribing an 8 day course of prednisone and also Symbicort 2 puffs twice a day.  Will decided future visit if Symbicort needs to be used regularly or can be used as needed.    Follow-up next week.    Client also has significant acute ENT symptoms.  I see no signs of a bacterial superinfection.  I think it is possible this may be due to her dust and dog allergies.  I restarted her Xyzal.      Aileen was seen today for allergies, asthma, follow-up, itching, nasal congestion and cough.    Diagnoses and all orders for this visit:    Exacerbation of asthma, unspecified asthma severity, unspecified whether persistent  -     predniSONE (DELTASONE) 20 MG tablet; 3 tablets a day for 5 days, then 1 tablet a day for 3 days, then stop.  -     NEBULIZER KIT (SUPPLIES) FOR HOME USE    Chronic allergic rhinitis  -     levocetirizine (XYZAL) 5 MG tablet; Take 1 tablet (5 mg total) by mouth 2 (two) times daily.    Allergic rhinitis due to house dust mite    Allergic rhinitis due to animal (cat) (dog) hair and dander    Smoking    Passive smoke exposure    Asthma, unspecified asthma severity, unspecified whether complicated, unspecified whether persistent  -     budesonide-formoterol 160-4.5 mcg (SYMBICORT) 160-4.5 mcg/actuation HFAA; Inhale 2 puffs into the lungs 2 (two) times daily as needed (shortness of breath or chest tightness). See written instructions.    Hypertension, unspecified type     Avoid oral decongestants    Patient Instructions   Treatment    Prednisone burst for 8 days (using 20 mg tablets)  3 tablets daily for 5 days,  Then 1 tablet daily for 3 days  Then stop.    Red Symbicort inhaler  2 puffs twice a day until next visit    Machine as often as  needed    Xyzal 2 tablets for itching or sneezing      Return next Friday            No follow-ups on file.    Doreen Hull MD    Coding by time

## 2022-01-19 NOTE — PROGRESS NOTES
Care Everywhere: updated  Immunization: updated  Health Maintenance: updated  Media Review: review for outside eye exam report   Legacy Review:   DIS:  Order placed: diabetic eye screening photo   Upcoming appts:  EFAX:  Task Tickets:  Referrals:

## 2022-01-19 NOTE — PATIENT INSTRUCTIONS
Treatment    Prednisone burst for 8 days (using 20 mg tablets)  3 tablets daily for 5 days,  Then 1 tablet daily for 3 days  Then stop.    Red Symbicort inhaler  2 puffs twice a day until next visit    Machine as often as needed    Xyzal 2 tablets for itching or sneezing      Return next Friday

## 2022-01-20 ENCOUNTER — TELEPHONE (OUTPATIENT)
Dept: ALLERGY | Facility: CLINIC | Age: 62
End: 2022-01-20
Payer: MEDICARE

## 2022-01-20 NOTE — TELEPHONE ENCOUNTER
----- Message from Marguerite Hammer sent at 1/20/2022  4:13 PM CST -----  Regarding: refill ( 2nd request)  Can the clinic reply in [facility]: no           Please refill the medication(s) listed below. Please call the patient when the prescription(s) is ready for  at this phone number    [phone]           Medication #1 levocetirizine (XYZAL) 5 MG tablet       Medication #2            Preferred Pharmacy: [id number] - [place] [id number] [facility]    States only one pill a day for 90 day is only what insurance will pay for

## 2022-01-21 ENCOUNTER — TELEPHONE (OUTPATIENT)
Dept: ALLERGY | Facility: CLINIC | Age: 62
End: 2022-01-21
Payer: MEDICARE

## 2022-01-21 ENCOUNTER — PES CALL (OUTPATIENT)
Dept: ADMINISTRATIVE | Facility: CLINIC | Age: 62
End: 2022-01-21
Payer: MEDICARE

## 2022-01-21 DIAGNOSIS — L29.9 ITCHING: Primary | ICD-10-CM

## 2022-01-21 RX ORDER — CETIRIZINE HYDROCHLORIDE 10 MG/1
10 TABLET ORAL DAILY
Qty: 90 TABLET | Refills: 3 | Status: SHIPPED | OUTPATIENT
Start: 2022-01-21 | End: 2023-04-19 | Stop reason: SDUPTHER

## 2022-01-21 NOTE — TELEPHONE ENCOUNTER
I spoke with the patient and advised that Dr. Hull sent in the corrected prescription and suggested that if 1 tablet daily was not enough the patient could buy extra medication to increase to 2 tablets daily over the counter. PT verbalized understanding.

## 2022-01-21 NOTE — PROGRESS NOTES
Rewrote Zyrtec per formulary restrictions.  Instructed patient to still take 2 a day, even through she will have to buy some over the counter.

## 2022-01-21 NOTE — TELEPHONE ENCOUNTER
----- Message from Doreen Hull MD sent at 1/21/2022  2:54 PM CST -----  Regarding: Zyrtec change  Contact: self @ [phone]  I rewrote the prescription as the insurance asked.    Please tell patient that if 1 is not enough, she can buy the remainder over the counter.  [facility] and [employer] have excellent prices.  ----- Message -----  From: Neyda Nolan MA  Sent: 1/21/2022   1:39 PM CST  To: MD Dr. Kurtis Hernandez,     The patient's insurance will not cover the Xyzal at 1 tablet BID. They will only cover 1 tablet once daily with a 90 day supply. Can we resend the prescription with those instructions?    ThanksNeyda   ----- Message -----  From: Renetta Cheek  Sent: 1/21/2022   8:39 AM CST  To: Kurtis Logan Staff    Pt says [employer] will not pay for her prescription of levocetirizine (XYZAL) 5 MG tablet for 2 tablets a day but will pay for 1 a day.  Pt is calling to see if a new prescription can be sent in for her.  Pls call.       [facility] #[id number] - [facility] [address] AT [facility] & [address]   Phone:  [phone]  Fax:  [phone]

## 2022-01-21 NOTE — TELEPHONE ENCOUNTER
----- Message from Renetta Cheek sent at 1/21/2022  8:37 AM CST -----  Contact: self @ [phone]  Pt says "[employer] will not pay for her prescription of levocetirizine (XYZAL) 5 MG tablet for 2 tablets a day but will pay for 1 a day.  Pt is calling to see if a new prescription can be sent in for her.  Pls call.       [facility] #[id number] - [facility] [address] AT [facility] & [place]   Phone:  [phone]  Fax:  [phone]

## 2022-01-21 NOTE — TELEPHONE ENCOUNTER
Spoke with the patient and informed her I sent a message to Dr. Hull to change the prescription directions so that the patient's insurance would cover the medication. We agreed I would send her a My Chart message once Dr. Hull responded.

## 2022-01-28 ENCOUNTER — OFFICE VISIT (OUTPATIENT)
Dept: ALLERGY | Facility: CLINIC | Age: 62
End: 2022-01-28
Payer: MEDICARE

## 2022-01-28 VITALS — HEIGHT: 64 IN | WEIGHT: 144.81 LBS | BODY MASS INDEX: 24.72 KG/M2

## 2022-01-28 DIAGNOSIS — K04.7 DENTAL ABSCESS: ICD-10-CM

## 2022-01-28 DIAGNOSIS — J30.81 ALLERGIC RHINITIS DUE TO ANIMAL (CAT) (DOG) HAIR AND DANDER: ICD-10-CM

## 2022-01-28 DIAGNOSIS — J30.9 CHRONIC ALLERGIC RHINITIS: ICD-10-CM

## 2022-01-28 DIAGNOSIS — F17.200 SMOKING: ICD-10-CM

## 2022-01-28 DIAGNOSIS — J30.89 ALLERGIC RHINITIS DUE TO HOUSE DUST MITE: ICD-10-CM

## 2022-01-28 DIAGNOSIS — Z77.22 PASSIVE SMOKE EXPOSURE: ICD-10-CM

## 2022-01-28 DIAGNOSIS — E11.9 TYPE 2 DIABETES MELLITUS WITHOUT COMPLICATION, WITHOUT LONG-TERM CURRENT USE OF INSULIN: ICD-10-CM

## 2022-01-28 DIAGNOSIS — J45.40 MODERATE PERSISTENT ASTHMA, UNSPECIFIED WHETHER COMPLICATED: Primary | ICD-10-CM

## 2022-01-28 DIAGNOSIS — H10.403 CHRONIC CONJUNCTIVITIS OF BOTH EYES, UNSPECIFIED CHRONIC CONJUNCTIVITIS TYPE: ICD-10-CM

## 2022-01-28 PROCEDURE — 99214 PR OFFICE/OUTPT VISIT, EST, LEVL IV, 30-39 MIN: ICD-10-PCS | Mod: S$GLB,,, | Performed by: STUDENT IN AN ORGANIZED HEALTH CARE EDUCATION/TRAINING PROGRAM

## 2022-01-28 PROCEDURE — 3008F BODY MASS INDEX DOCD: CPT | Mod: CPTII,S$GLB,, | Performed by: STUDENT IN AN ORGANIZED HEALTH CARE EDUCATION/TRAINING PROGRAM

## 2022-01-28 PROCEDURE — 99999 PR PBB SHADOW E&M-EST. PATIENT-LVL III: CPT | Mod: PBBFAC,,, | Performed by: STUDENT IN AN ORGANIZED HEALTH CARE EDUCATION/TRAINING PROGRAM

## 2022-01-28 PROCEDURE — 99499 RISK ADDL DX/OHS AUDIT: ICD-10-PCS | Mod: S$GLB,,, | Performed by: STUDENT IN AN ORGANIZED HEALTH CARE EDUCATION/TRAINING PROGRAM

## 2022-01-28 PROCEDURE — 99214 OFFICE O/P EST MOD 30 MIN: CPT | Mod: S$GLB,,, | Performed by: STUDENT IN AN ORGANIZED HEALTH CARE EDUCATION/TRAINING PROGRAM

## 2022-01-28 PROCEDURE — 1159F MED LIST DOCD IN RCRD: CPT | Mod: CPTII,S$GLB,, | Performed by: STUDENT IN AN ORGANIZED HEALTH CARE EDUCATION/TRAINING PROGRAM

## 2022-01-28 PROCEDURE — 3008F PR BODY MASS INDEX (BMI) DOCUMENTED: ICD-10-PCS | Mod: CPTII,S$GLB,, | Performed by: STUDENT IN AN ORGANIZED HEALTH CARE EDUCATION/TRAINING PROGRAM

## 2022-01-28 PROCEDURE — 1159F PR MEDICATION LIST DOCUMENTED IN MEDICAL RECORD: ICD-10-PCS | Mod: CPTII,S$GLB,, | Performed by: STUDENT IN AN ORGANIZED HEALTH CARE EDUCATION/TRAINING PROGRAM

## 2022-01-28 PROCEDURE — 99499 UNLISTED E&M SERVICE: CPT | Mod: S$GLB,,, | Performed by: STUDENT IN AN ORGANIZED HEALTH CARE EDUCATION/TRAINING PROGRAM

## 2022-01-28 PROCEDURE — 99999 PR PBB SHADOW E&M-EST. PATIENT-LVL III: ICD-10-PCS | Mod: PBBFAC,,, | Performed by: STUDENT IN AN ORGANIZED HEALTH CARE EDUCATION/TRAINING PROGRAM

## 2022-01-28 RX ORDER — LEVOCETIRIZINE DIHYDROCHLORIDE 5 MG/1
5 TABLET, FILM COATED ORAL NIGHTLY
Qty: 30 TABLET | Refills: 11 | Status: SHIPPED | OUTPATIENT
Start: 2022-01-28 | End: 2023-04-19 | Stop reason: SDUPTHER

## 2022-01-28 RX ORDER — PREDNISONE 10 MG/1
TABLET ORAL
Qty: 21 TABLET | Refills: 0 | Status: SHIPPED | OUTPATIENT
Start: 2022-01-28 | End: 2022-04-08 | Stop reason: ALTCHOICE

## 2022-01-28 RX ORDER — AMOXICILLIN 500 MG/1
500 CAPSULE ORAL 2 TIMES DAILY
Qty: 14 CAPSULE | Refills: 0 | Status: SHIPPED | OUTPATIENT
Start: 2022-01-28 | End: 2022-04-08 | Stop reason: ALTCHOICE

## 2022-01-28 RX ORDER — ALBUTEROL SULFATE 90 UG/1
2 AEROSOL, METERED RESPIRATORY (INHALATION) EVERY 4 HOURS PRN
Qty: 20.1 G | Refills: 11 | Status: SHIPPED | OUTPATIENT
Start: 2022-01-28 | End: 2023-01-28

## 2022-01-28 NOTE — PATIENT INSTRUCTIONS
Now:    Red Symbicort inhaler 2 puffs twice a day    Xyzal 2 tablets a day    Amoxacillin 1 tablet twice a day until you see your dentist        Future:  Prednisone prescription sent to pharmacy if you need it in the future.

## 2022-01-28 NOTE — PROGRESS NOTES
[facility] Note  [facility]    This note was created by combination of typed  and M-Modal dictation. Transcription errors may be present.  If there are any questions, please contact me.      Subjective:      Patient ID: Aileen Alegria is a 61 y.o. female.    Chief Complaint: No chief complaint on file.      Allergy problem list:     Chronic allergic rhinitis: dust mite, pet dog  Persistent asthma  Frequent sinusitis  Smoking  (Hypertension)    History of Present Illness:  60-year-old female with asthma and chronic allergic rhinitis is here to follow chest sx following a prednisone burst 1/19/2021 for severe asthma exacerbation.  At that time she was also started on Symbicort twice daily.  She is here alone, and the history is difficult.    Related medications  --     S/p prednisone f8 1/19/22  Symbicort 160/4.5, 2 puffs twice daily -- new last visit  Albuterol nebs   Singulair -- not taking  Nasonex -- not taking  Xyzal  -- restarted last visit  (Pepcid) -- not sure    1/28/22:  Client states she feels better than her previous baseline.  She is having no shortness of breath, chest pain, or wheezing.  She said the last time she used her albuterol all was 3-4 days ago.  She is sleeping through the night without difficulty and has resumed all her previous activities.  She has had no problems with the Symbicort.  She says she would like me to follow her for her asthma and that she is committed to regular follow-up.  She requests a prescription for prednisone for self directed use in the future if needed.    She also reports improvement in her rhinitis symptoms, specifically the sneezing and nasal congestion.  She had problems filling the Xyzal 10 mg daily.  Evidently there is a formulary restriction for 5 mg daily maximum.    Patient complains of a recurrent dental abscess on the left lower jaw.  She requests a prescription for preprocedure antibiotics.    No other problems or  "complaints      1/19/22:    Client said that for the previous 2-3 weeks she has been experiencing both head and chest symptoms.  The head symptoms consist of a right-sided headache, itchy throat, itchy right ear, sore nostrils, runny nose, sneezing, soreness on the bridge of her nose extending toward her maxillary sinuses, and copious white material from her nose.  She denies fever or purulence.  She also denies ill exposures.    Over the same time.  She was experiencing chest "itching" and tightness.  This was associated with severe shortness of breath. She also admitted to wheezing and coughing.  Symptoms were severe and interfering with ADLs such as house cleaning and sleep.  She reported complete relief with 1-4 albuterol ampules at a time.  This relief lasted 4-5 hours.  She awakened at night due to shortness of breath every night she was using at least 12 ampules of albuterol by neb per day.  She is not taking any other medications.She reported her last asthma exacerbation was 2-3 months previously.      5/26/2021:  She reported that her breathing had been bad for the prior two months.  She is unable to tell me whether she is referring to breathing in her chest, breathing in her nose, or both.  The symptoms she can define are fatigue, itching of eyes and nose, headache and sore nose.  She also reports "grasping" but is unable to define further.  She reports she was blowing albuterol from her son's nebulizer into her nose but stopped due to drying sensation.  Did not know what medications she was taking.  She was referred to ENT, given her own nebulizer, increased her Xyzal dose to control the itching.    10/8/2020:  At her initial visit  she reported a long history of rhinitis.  Her chief complaint was bilateral ear itching which kept her awake at night.  Other symptoms include complete nasal blockage at times, nasal congestion, runny nose, postnasal drip sensation, sneezing, itchy eyes, red eyes and throat " clearing.  Symptoms are severe and interfere with her ability to function.  She says she often takes naps in order to avoid feeling these symptoms.  This is despite very frequent use of antihistamines and nasal steroids on a daily basis.  Precipitants include smoke and fresh cut grass.  Symptoms are sometimes worse outside.  They are perennial without a noticeable variation.  When symptoms are severe she often gets sinus infections.  These are manifest primarily with tenderness of her forehead and complete nasal blockage.  She has had no workup to date.  She admits an improvement in her symptoms (but not complete resolution) when she was taking steroids for torn rotator cuff earlier this year.  She had no steroid side effects.    For the previous 6 months (since about April 2020) she had also been experiencing chest symptoms. She describes an itching feeling in the center of her chest.  This is associated with chest tightness, shortness of breath, wheezing, and coughing.  At times the shortness of breath is severe.  She has been helped by a relative's albuterol by nebulizer.  These severe shortness of breath episodes have occurred twice in the last 6 months.  There are no clear precipitants.  She has had no workup.    Additional History:  Past medical history is significant for diabetes and smoking.  No Hx of ENT surgery but did have neck surgery in 2020.  Family history is significant asthma and allergies in her children.  Client  reports that she has been smoking cigarettes. She has never used smokeless tobacco.  Exposures are notable for a pet dog and frequent house cleaning.  She is also exposed to both passive and active smoke.      Patient Active Problem List   Diagnosis    Decreased range of motion of left shoulder    Decreased strength    Cigarette nicotine dependence without complication    Rotator cuff tear arthropathy of left shoulder    Environmental and seasonal allergies    H/O cervical spine  surgery    Type 2 diabetes mellitus without complication, without long-term current use of insulin    Hyperlipidemia LDL goal <100    Allergic rhinitis due to house dust mite    Bronchitis    Chronic right shoulder pain    10 year risk of MI or stroke 7.5% or greater    Encounter for smoking cessation counseling     Current Outpatient Medications on File Prior to Visit   Medication Sig Dispense Refill    albuterol (ACCUNEB) 1.25 mg/3 mL Nebu USE 1 VIAL PER NEBULIZER EVERY 6 HOURS AS NEEDED 225 mL 1    aspirin (ECOTRIN) 81 MG EC tablet Take 1 tablet (81 mg total) by mouth once daily. 90 tablet 3    atorvastatin (LIPITOR) 40 MG tablet Take 1 tablet (40 mg total) by mouth every evening. (Patient not taking: Reported on 2022) 90 tablet 3    budesonide-formoterol 160-4.5 mcg (SYMBICORT) 160-4.5 mcg/actuation HFAA Inhale 2 puffs into the lungs 2 (two) times daily as needed (shortness of breath or chest tightness). See written instructions. 20.4 g 1    buPROPion (WELLBUTRIN SR) 150 MG TBSR 12 hr tablet Take 1 tablet (150 mg total) by mouth once daily for 3 days, THEN 1 tablet (150 mg total) 2 (two) times daily. 177 tablet 0    cetirizine (ZYRTEC) 10 MG tablet Take 1 tablet (10 mg total) by mouth once daily. 2 tablets every morning for itching 90 tablet 3    cyclobenzaprine (FLEXERIL) 10 MG tablet       famotidine (PEPCID) 20 MG tablet Take 20 mg by mouth once daily.      levocetirizine (XYZAL) 5 MG tablet Take 1 tablet (5 mg total) by mouth 2 (two) times daily. 90 tablet 3    meloxicam (MOBIC) 15 MG tablet Take 15 mg by mouth once daily.      methocarbamoL (ROBAXIN) 500 MG Tab SMARTSI Tablet(s) By Mouth Every 12 Hours      mometasone (NASONEX) 50 mcg/actuation nasal spray 2 sprays by Nasal route 2 (two) times daily. (Patient not taking: Reported on 2022) 2 each 5    montelukast (SINGULAIR) 10 mg tablet TK 1 T PO QD FOR ASTHMA OR ALLERGIES      naproxen (NAPROSYN) 500 MG tablet TK 1 T PO  BID PRF PAIN      predniSONE (DELTASONE) 20 MG tablet 3 tablets a day for 5 days, then 1 tablet a day for 3 days, then stop. 18 tablet 0    traMADoL (ULTRAM) 50 mg tablet SMARTSI Tablet(s) By Mouth Every 12 Hours PRN       No current facility-administered medications on file prior to visit.         Review of Systems   Constitutional: Negative for chills and fever.   HENT: Negative for congestion, ear discharge and nosebleeds.    Eyes: Negative for discharge and redness.   Respiratory: Negative for cough, hemoptysis, sputum production, shortness of breath, wheezing and stridor.    Cardiovascular: Negative for chest pain and palpitations.   Gastrointestinal: Negative for blood in stool, melena and vomiting.   Genitourinary: Negative for flank pain and hematuria.   Musculoskeletal: Negative for joint pain and myalgias.   Skin: Negative for itching and rash.   Neurological: Negative for seizures and loss of consciousness.       Objective:   There were no vitals taken for this visit.      Physical Exam  HENT:      Head: Normocephalic and atraumatic.      Comments: Tympanic membranes are clear bilaterally.  Nares are pink with moderate turbinate swelling.  Oropharynx benign without exudate.  Tongue is not coated.     Nose: No rhinorrhea.   Eyes:      General: No scleral icterus.     Conjunctiva/sclera: Conjunctivae normal.   Cardiovascular:      Rate and Rhythm: Normal rate and regular rhythm.      Heart sounds: Normal heart sounds.   Pulmonary:      Effort: Pulmonary effort is normal. No respiratory distress.      Breath sounds: Normal breath sounds. No stridor. No wheezing or rales.   Abdominal:      General: There is no distension.   Musculoskeletal:         General: No deformity.      Cervical back: Neck supple.   Lymphadenopathy:      Cervical: No cervical adenopathy.   Skin:     Findings: No erythema or rash.   Neurological:      General: No focal deficit present.      Mental Status: She is alert.    Psychiatric:         Mood and Affect: Affect normal.         Cognition and Memory: Memory normal.         Data:   Aeroallergen testing by the Immunocap method (10/07/2020) was positive for dust mite and dog.   Class III   dust mite (Df and Dp), dog  All other allergens less than 0.35 KU/L. Ragweed 0.11  Total serum IgE 74    Eosinophil count 500 on CBC of 8/03/2020    Assessment:     1. Moderate persistent asthma, unspecified whether complicated    2. Smoking    3. Chronic allergic rhinitis    4. Allergic rhinitis due to house dust mite    5. Allergic rhinitis due to animal (cat) (dog) hair and dander    6. Chronic conjunctivitis of both eyes, unspecified chronic conjunctivitis type    7. Passive smoke exposure    8. Type 2 diabetes mellitus without complication, without long-term current use of insulin        Plan:     Medical decision making:  Patient's asthma is doing extremely well.  It will remain to see if this level of improvement is maintained without prednisone.  Today I emphasized the use of regular dosing and retaught technique.  She is a good candidate for self directed prednisone use in the future; however I think with regular follow-up care she may not need it.  Sent prescription to [facility].    For her chronic allergic rhinitis it is unfortunate that formulary restrictions are affecting care.  I told her to take 2 tablets daily for the next 45 days.    I prescribed a 7 day course of amoxicillin 500 mg b.i.d. for her to take prior to her dental appointment next week.  This will allow her dentist to perform the procedure at the 1st visit.  Instructed her to keep her dental appointment even if she feels completely better.    We did not address her smoking today        Diagnoses and all orders for this visit:    Moderate persistent asthma, unspecified whether complicated    Smoking    Chronic allergic rhinitis    Allergic rhinitis due to house dust mite    Allergic rhinitis due to animal (cat) (dog)  hair and dander    Chronic conjunctivitis of both eyes, unspecified chronic conjunctivitis type    Passive smoke exposure    Type 2 diabetes mellitus without complication, without long-term current use of insulin     Avoid oral decongestants    There are no Patient Instructions on file for this visit.    No follow-ups on file.    Doreen Hull MD    Coding by time

## 2022-02-14 ENCOUNTER — PATIENT OUTREACH (OUTPATIENT)
Dept: ADMINISTRATIVE | Facility: HOSPITAL | Age: 62
End: 2022-02-14
Payer: MEDICARE

## 2022-02-14 NOTE — PROGRESS NOTES
Health Maintenance Due   Topic Date Due    Influenza Vaccine (1) Never done    Eye Exam  10/01/2021    Hemoglobin A1c  11/28/2021     Triggered LINKS.  Updated Care Everywhere.  Chart was reviewed as part of the PHN Attestation for 2021.

## 2022-02-25 ENCOUNTER — OFFICE VISIT (OUTPATIENT)
Dept: HOME HEALTH SERVICES | Facility: CLINIC | Age: 62
End: 2022-02-25
Payer: MEDICARE

## 2022-02-25 VITALS
BODY MASS INDEX: 24.75 KG/M2 | TEMPERATURE: 97 F | WEIGHT: 145 LBS | HEIGHT: 64 IN | SYSTOLIC BLOOD PRESSURE: 141 MMHG | DIASTOLIC BLOOD PRESSURE: 98 MMHG | HEART RATE: 94 BPM

## 2022-02-25 DIAGNOSIS — Z00.00 ENCOUNTER FOR PREVENTIVE HEALTH EXAMINATION: Primary | ICD-10-CM

## 2022-02-25 DIAGNOSIS — E78.5 HYPERLIPIDEMIA LDL GOAL <100: ICD-10-CM

## 2022-02-25 DIAGNOSIS — G89.29 CHRONIC RIGHT SHOULDER PAIN: ICD-10-CM

## 2022-02-25 DIAGNOSIS — E11.9 TYPE 2 DIABETES MELLITUS WITHOUT COMPLICATION, WITHOUT LONG-TERM CURRENT USE OF INSULIN: ICD-10-CM

## 2022-02-25 DIAGNOSIS — J40 BRONCHITIS: ICD-10-CM

## 2022-02-25 DIAGNOSIS — M25.511 CHRONIC RIGHT SHOULDER PAIN: ICD-10-CM

## 2022-02-25 DIAGNOSIS — F17.210 CIGARETTE NICOTINE DEPENDENCE WITHOUT COMPLICATION: ICD-10-CM

## 2022-02-25 PROCEDURE — 1159F PR MEDICATION LIST DOCUMENTED IN MEDICAL RECORD: ICD-10-PCS | Mod: CPTII,S$GLB,, | Performed by: NURSE PRACTITIONER

## 2022-02-25 PROCEDURE — G0439 PPPS, SUBSEQ VISIT: HCPCS | Mod: S$GLB,,, | Performed by: NURSE PRACTITIONER

## 2022-02-25 PROCEDURE — 3080F DIAST BP >= 90 MM HG: CPT | Mod: CPTII,S$GLB,, | Performed by: NURSE PRACTITIONER

## 2022-02-25 PROCEDURE — 3077F SYST BP >= 140 MM HG: CPT | Mod: CPTII,S$GLB,, | Performed by: NURSE PRACTITIONER

## 2022-02-25 PROCEDURE — 3008F BODY MASS INDEX DOCD: CPT | Mod: CPTII,S$GLB,, | Performed by: NURSE PRACTITIONER

## 2022-02-25 PROCEDURE — 3077F PR MOST RECENT SYSTOLIC BLOOD PRESSURE >= 140 MM HG: ICD-10-PCS | Mod: CPTII,S$GLB,, | Performed by: NURSE PRACTITIONER

## 2022-02-25 PROCEDURE — 3008F PR BODY MASS INDEX (BMI) DOCUMENTED: ICD-10-PCS | Mod: CPTII,S$GLB,, | Performed by: NURSE PRACTITIONER

## 2022-02-25 PROCEDURE — 1159F MED LIST DOCD IN RCRD: CPT | Mod: CPTII,S$GLB,, | Performed by: NURSE PRACTITIONER

## 2022-02-25 PROCEDURE — 3080F PR MOST RECENT DIASTOLIC BLOOD PRESSURE >= 90 MM HG: ICD-10-PCS | Mod: CPTII,S$GLB,, | Performed by: NURSE PRACTITIONER

## 2022-02-25 PROCEDURE — 1160F PR REVIEW ALL MEDS BY PRESCRIBER/CLIN PHARMACIST DOCUMENTED: ICD-10-PCS | Mod: CPTII,S$GLB,, | Performed by: NURSE PRACTITIONER

## 2022-02-25 PROCEDURE — G0439 PR MEDICARE ANNUAL WELLNESS SUBSEQUENT VISIT: ICD-10-PCS | Mod: S$GLB,,, | Performed by: NURSE PRACTITIONER

## 2022-02-25 PROCEDURE — 1160F RVW MEDS BY RX/DR IN RCRD: CPT | Mod: CPTII,S$GLB,, | Performed by: NURSE PRACTITIONER

## 2022-02-25 PROCEDURE — 99499 UNLISTED E&M SERVICE: CPT | Mod: S$GLB,,, | Performed by: NURSE PRACTITIONER

## 2022-02-25 PROCEDURE — 99499 RISK ADDL DX/OHS AUDIT: ICD-10-PCS | Mod: S$GLB,,, | Performed by: NURSE PRACTITIONER

## 2022-02-25 NOTE — PATIENT INSTRUCTIONS
Counseling and Referral of Other Preventative  (Italic type indicates deductible and co-insurance are waived)    Patient Name: Aileen Alegria  Today's Date: 2/25/2022    Health Maintenance       Date Due Completion Date    Influenza Vaccine (1) Never done ---    Eye Exam 10/01/2021 10/1/2020    Hemoglobin A1c 11/28/2021 5/28/2021    Lipid Panel 05/28/2022 5/28/2021    Diabetes Urine Screening 06/04/2022 6/4/2021    Low Dose Statin 06/14/2022 6/14/2021    Foot Exam 06/14/2022 6/14/2021    Override on 6/4/2021: Done    Mammogram 10/08/2022 10/8/2021    Colorectal Cancer Screening 01/01/2023 1/1/2013 (Done)    Override on 1/1/2013: Done    Cervical Cancer Screening 06/04/2026 6/4/2021    Pneumococcal Vaccines (Age 0-64) (2 of 2 - PPSV23) 10/05/2027 6/4/2021    TETANUS VACCINE 02/28/2029 2/28/2019        No orders of the defined types were placed in this encounter.    The following information is provided to all patients.  This information is to help you find resources for any of the problems found today that may be affecting your health:                Living healthy guide: www.[facility].[place].gov      Understanding Diabetes: www.diabetes.org      Eating healthy: www.cdc.gov/healthyweight      CDC home safety checklist: www.cdc.gov/steadi/patient.html      Agency on Aging: www.goea.[place].gov      Alcoholics anonymous (AA): www.aa.org      Physical Activity: www.ezio.nih.gov/xs0dhxr      Tobacco use: www.quitwithusla.org

## 2022-02-25 NOTE — PROGRESS NOTES
"  Aileen Alegria presented for a  Medicare AWV and comprehensive Health Risk Assessment today. The following components were reviewed and updated:    · Medical history  · Family History  · Social history  · Allergies and Current Medications  · Health Risk Assessment  · Health Maintenance  · Care Team         ** See Completed Assessments for Annual Wellness Visit within the encounter summary.**         The following assessments were completed:  · Living Situation  · CAGE  · Depression Screening  · Timed Get Up and Go  · Whisper Test  · Cognitive Function Screening  ·   ·   ·   · Nutrition Screening  · ADL Screening  · PAQ Screening        Vitals:    02/25/22 0933   BP: (!) 141/98   Pulse: 94   Temp: 97 °F (36.1 °C)   Weight: 65.8 kg (145 lb)   Height: 5' 4" (1.626 m)     Body mass index is 24.89 kg/m².  Physical Exam  Constitutional:       Appearance: Normal appearance.   HENT:      Head: Normocephalic and atraumatic.      Nose: Nose normal.      Mouth/Throat:      Mouth: Mucous membranes are moist.   Eyes:      Extraocular Movements: Extraocular movements intact.   Cardiovascular:      Rate and Rhythm: Normal rate and regular rhythm.      Heart sounds: Normal heart sounds. No murmur heard.  Pulmonary:      Effort: Pulmonary effort is normal. No respiratory distress.      Breath sounds: Normal breath sounds.   Abdominal:      General: Bowel sounds are normal. There is no distension.      Palpations: Abdomen is soft.   Musculoskeletal:         General: No swelling. Normal range of motion.      Cervical back: Normal range of motion.   Skin:     General: Skin is warm and dry.   Neurological:      General: No focal deficit present.      Mental Status: She is alert and oriented to person, place, and time.   Psychiatric:         Mood and Affect: Mood normal.         Behavior: Behavior normal.               Diagnoses and health risks identified today and associated recommendations/orders:    1. Encounter for preventive health " examination  Assessments completed. Preventive measures and health maintenance reviewed with patient.    2. Type 2 diabetes mellitus without complication, without long-term current use of insulin  Stable, followed by PCP.    3. Bronchitis  Stable, followed by PCP.    4. Hyperlipidemia LDL goal <100  Stable, followed by PCP.    5. Chronic right shoulder pain  Stable, followed by PCP.    6. Cigarette nicotine dependence without complication  Stable, followed by PCP. Smoking cessation encouraged, patient decline at this time.    Provided Aileen with a 5-10 year written screening schedule and personal prevention plan. Recommendations were developed using the USPSTF age appropriate recommendations. Education, counseling, and referrals were provided as needed. After Visit Summary printed and given to patient which includes a list of additional screenings\tests needed.    Follow up in about 1 year (around 2/25/2023) for your next annual wellness visit.    Luann Martines NP  I offered to discuss advanced care planning, including how to pick a person who would make decisions for you if you were unable to make them for yourself, called a health care power of , and what kind of decisions you might make such as use of life sustaining treatments such as ventilators and tube feeding when faced with a life limiting illness recorded on a living will that they will need to know. (How you want to be cared for as you near the end of your natural life)     X Patient is interested in learning more about how to make advanced directives.  I provided them paperwork and offered to discuss this with them.

## 2022-04-08 ENCOUNTER — OFFICE VISIT (OUTPATIENT)
Dept: PRIMARY CARE CLINIC | Facility: CLINIC | Age: 62
End: 2022-04-08
Payer: MEDICARE

## 2022-04-08 VITALS
TEMPERATURE: 98 F | OXYGEN SATURATION: 97 % | DIASTOLIC BLOOD PRESSURE: 72 MMHG | HEIGHT: 64 IN | SYSTOLIC BLOOD PRESSURE: 138 MMHG | HEART RATE: 98 BPM | BODY MASS INDEX: 24.84 KG/M2 | WEIGHT: 145.5 LBS

## 2022-04-08 DIAGNOSIS — G95.9 CERVICAL MYELOPATHY: ICD-10-CM

## 2022-04-08 DIAGNOSIS — G89.29 CHRONIC RIGHT SHOULDER PAIN: ICD-10-CM

## 2022-04-08 DIAGNOSIS — E78.5 HYPERLIPIDEMIA LDL GOAL <100: ICD-10-CM

## 2022-04-08 DIAGNOSIS — M25.511 CHRONIC RIGHT SHOULDER PAIN: ICD-10-CM

## 2022-04-08 DIAGNOSIS — M25.561 CHRONIC PAIN OF RIGHT KNEE: Primary | ICD-10-CM

## 2022-04-08 DIAGNOSIS — E11.65 TYPE 2 DIABETES MELLITUS WITH HYPERGLYCEMIA, WITHOUT LONG-TERM CURRENT USE OF INSULIN: ICD-10-CM

## 2022-04-08 DIAGNOSIS — J30.89 ENVIRONMENTAL AND SEASONAL ALLERGIES: ICD-10-CM

## 2022-04-08 DIAGNOSIS — J45.40 MODERATE PERSISTENT ASTHMA WITHOUT COMPLICATION: ICD-10-CM

## 2022-04-08 DIAGNOSIS — G89.29 CHRONIC PAIN OF RIGHT KNEE: Primary | ICD-10-CM

## 2022-04-08 DIAGNOSIS — F17.210 CIGARETTE NICOTINE DEPENDENCE WITHOUT COMPLICATION: ICD-10-CM

## 2022-04-08 PROCEDURE — 96372 THER/PROPH/DIAG INJ SC/IM: CPT | Mod: S$GLB,,, | Performed by: FAMILY MEDICINE

## 2022-04-08 PROCEDURE — 99499 UNLISTED E&M SERVICE: CPT | Mod: S$GLB,,, | Performed by: FAMILY MEDICINE

## 2022-04-08 PROCEDURE — 3008F BODY MASS INDEX DOCD: CPT | Mod: CPTII,S$GLB,, | Performed by: FAMILY MEDICINE

## 2022-04-08 PROCEDURE — 99999 PR PBB SHADOW E&M-EST. PATIENT-LVL V: ICD-10-PCS | Mod: PBBFAC,,, | Performed by: FAMILY MEDICINE

## 2022-04-08 PROCEDURE — 3008F PR BODY MASS INDEX (BMI) DOCUMENTED: ICD-10-PCS | Mod: CPTII,S$GLB,, | Performed by: FAMILY MEDICINE

## 2022-04-08 PROCEDURE — 99499 RISK ADDL DX/OHS AUDIT: ICD-10-PCS | Mod: S$GLB,,, | Performed by: FAMILY MEDICINE

## 2022-04-08 PROCEDURE — 99214 PR OFFICE/OUTPT VISIT, EST, LEVL IV, 30-39 MIN: ICD-10-PCS | Mod: 25,S$GLB,, | Performed by: FAMILY MEDICINE

## 2022-04-08 PROCEDURE — 1160F PR REVIEW ALL MEDS BY PRESCRIBER/CLIN PHARMACIST DOCUMENTED: ICD-10-PCS | Mod: CPTII,S$GLB,, | Performed by: FAMILY MEDICINE

## 2022-04-08 PROCEDURE — 96372 PR INJECTION,THERAP/PROPH/DIAG2ST, IM OR SUBCUT: ICD-10-PCS | Mod: S$GLB,,, | Performed by: FAMILY MEDICINE

## 2022-04-08 PROCEDURE — 3075F PR MOST RECENT SYSTOLIC BLOOD PRESS GE 130-139MM HG: ICD-10-PCS | Mod: CPTII,S$GLB,, | Performed by: FAMILY MEDICINE

## 2022-04-08 PROCEDURE — 1159F PR MEDICATION LIST DOCUMENTED IN MEDICAL RECORD: ICD-10-PCS | Mod: CPTII,S$GLB,, | Performed by: FAMILY MEDICINE

## 2022-04-08 PROCEDURE — 3078F DIAST BP <80 MM HG: CPT | Mod: CPTII,S$GLB,, | Performed by: FAMILY MEDICINE

## 2022-04-08 PROCEDURE — 3075F SYST BP GE 130 - 139MM HG: CPT | Mod: CPTII,S$GLB,, | Performed by: FAMILY MEDICINE

## 2022-04-08 PROCEDURE — 1159F MED LIST DOCD IN RCRD: CPT | Mod: CPTII,S$GLB,, | Performed by: FAMILY MEDICINE

## 2022-04-08 PROCEDURE — 99214 OFFICE O/P EST MOD 30 MIN: CPT | Mod: 25,S$GLB,, | Performed by: FAMILY MEDICINE

## 2022-04-08 PROCEDURE — 3078F PR MOST RECENT DIASTOLIC BLOOD PRESSURE < 80 MM HG: ICD-10-PCS | Mod: CPTII,S$GLB,, | Performed by: FAMILY MEDICINE

## 2022-04-08 PROCEDURE — 1160F RVW MEDS BY RX/DR IN RCRD: CPT | Mod: CPTII,S$GLB,, | Performed by: FAMILY MEDICINE

## 2022-04-08 PROCEDURE — 99999 PR PBB SHADOW E&M-EST. PATIENT-LVL V: CPT | Mod: PBBFAC,,, | Performed by: FAMILY MEDICINE

## 2022-04-08 RX ORDER — TRIAMCINOLONE ACETONIDE 40 MG/ML
40 INJECTION, SUSPENSION INTRA-ARTICULAR; INTRAMUSCULAR
Status: COMPLETED | OUTPATIENT
Start: 2022-04-08 | End: 2022-04-08

## 2022-04-08 RX ADMIN — TRIAMCINOLONE ACETONIDE 40 MG: 40 INJECTION, SUSPENSION INTRA-ARTICULAR; INTRAMUSCULAR at 11:04

## 2022-04-08 NOTE — PROGRESS NOTES
CC: Right shoulder pain     61 y.o. Female presents as a new patient to me.  Complaint is right shoulder to arm pain.  Describes achiness.  Tender to touch.  Associated intermittent numbness and tingling.  Symptoms have been present now for the last several weeks.  Similar and quality and location to complaints preceding her neck surgery on 2022 with Dr. Humberto Cordova at [facility]. This was a reported posterior laminoplasty C2-C7.  She does report neck pain also of recurrence recently.  No follow-up with her surgeon.  Denies any prior shoulder specific issues otherwise.    Positive for tobacco.   Positive for diabetes. Last A1C: 6.6 on 2021    PAST MEDICAL HISTORY:   Past Medical History:   Diagnosis Date    Allergy     Environmental and seasonal allergies     Moderate persistent asthma without complication 2022       PAST SURGICAL HISTORY:  Past Surgical History:   Procedure Laterality Date     SECTION      NECK SURGERY  2020    C 2-C7       FAMILY HISTORY:  Family History   Problem Relation Age of Onset    Lung cancer Mother     Dementia Father     Hypertension Father     Prostate cancer Father     Hypertension Sister     No Known Problems Brother        MEDICATIONS:    Current Outpatient Medications:     albuterol (ACCUNEB) 1.25 mg/3 mL Nebu, USE 1 VIAL PER NEBULIZER EVERY 6 HOURS AS NEEDED, Disp: 225 mL, Rfl: 1    albuterol (PROVENTIL/VENTOLIN HFA) 90 mcg/actuation inhaler, Inhale 2 puffs into the lungs every 4 (four) hours as needed for Wheezing (shortness of breath). Rescue, Disp: 20.1 g, Rfl: 11    aspirin (ECOTRIN) 81 MG EC tablet, Take 1 tablet (81 mg total) by mouth once daily., Disp: 90 tablet, Rfl: 3    budesonide-formoterol 160-4.5 mcg (SYMBICORT) 160-4.5 mcg/actuation HFAA, Inhale 2 puffs into the lungs 2 (two) times daily as needed (shortness of breath or chest tightness). See written instructions., Disp: 20.4 g, Rfl: 1    camphor-menthoL 0.2-3.5 % Gel, Apply 1  application topically 2 (two) times daily as needed (pain)., Disp: 113.4 g, Rfl: 3    cetirizine (ZYRTEC) 10 MG tablet, Take 1 tablet (10 mg total) by mouth once daily. 2 tablets every morning for itching, Disp: 90 tablet, Rfl: 3    cyclobenzaprine (FLEXERIL) 10 MG tablet, , Disp: , Rfl:     famotidine (PEPCID) 20 MG tablet, Take 20 mg by mouth once daily., Disp: , Rfl:     levocetirizine (XYZAL) 5 MG tablet, Take 1 tablet (5 mg total) by mouth every evening., Disp: 30 tablet, Rfl: 11    meloxicam (MOBIC) 15 MG tablet, Take 15 mg by mouth once daily., Disp: , Rfl:     methocarbamoL (ROBAXIN) 500 MG Tab, SMARTSI Tablet(s) By Mouth Every 12 Hours, Disp: , Rfl:     mometasone (NASONEX) 50 mcg/actuation nasal spray, 2 sprays by Nasal route 2 (two) times daily., Disp: 2 each, Rfl: 5    montelukast (SINGULAIR) 10 mg tablet, TK 1 T PO QD FOR ASTHMA OR ALLERGIES, Disp: , Rfl:     naproxen (NAPROSYN) 500 MG tablet, TK 1 T PO BID PRF PAIN, Disp: , Rfl:     traMADoL (ULTRAM) 50 mg tablet, SMARTSI Tablet(s) By Mouth Every 12 Hours PRN, Disp: , Rfl:     atorvastatin (LIPITOR) 40 MG tablet, Take 1 tablet (40 mg total) by mouth every evening. (Patient not taking: No sig reported), Disp: 90 tablet, Rfl: 3    buPROPion (WELLBUTRIN SR) 150 MG TBSR 12 hr tablet, Take 1 tablet (150 mg total) by mouth once daily for 3 days, THEN 1 tablet (150 mg total) 2 (two) times daily., Disp: 177 tablet, Rfl: 0    methylPREDNISolone (MEDROL DOSEPACK) 4 mg tablet, use as directed, Disp: 21 each, Rfl: 0    ALLERGIES:  Review of patient's allergies indicates:   Allergen Reactions    Diphenhydramine hcl Anaphylaxis        REVIEW OF SYSTEMS:  Constitution: Negative. Negative for chills, fever and night sweats.    Hematologic/Lymphatic: Negative for bleeding problem. Does not bruise/bleed easily.   Skin: Negative for dry skin, itching and rash.   Musculoskeletal: Negative for falls. Positive for right shoulder/arm pain and muscle  "weakness.     All other review of symptoms were reviewed and found to be noncontributory.     PHYSICAL EXAMINATION:  Vitals:  /86   Pulse 96   Ht 5' 4" (1.626 m)   Wt 64.1 kg (141 lb 5 oz)   BMI 24.26 kg/m²    General: Well-developed well-nourished 61 y.o. femalein no acute distress   Cardiovascular: Regular rhythm by palpation of distal pulse, normal color and temperature, no concerning varicosities on symptomatic side   Lungs: No labored breathing or wheezing appreciated   Neuro: Alert and oriented ×3   Psychiatric: well oriented to person, place and time, demonstrates normal mood and affect   Skin: No rashes, lesions or ulcers, normal temperature, turgor, and texture on uninvolved extremity    Ortho/SPM Exam  Examination of the right shoulder demonstrates active forward elevation the scapular plane to 140 with limitation due to reported pain.  She also appears to have some weakness.  Diffuse tenderness over the right shoulder and upper arm.  Also appears tender over the forearm globally.  Sensation intact to light touch grossly over the right upper extremity.  She demonstrates weakness compared to the contralateral side with shoulder abduction, elbow flexion, elbow extension, wrist flexion, wrist extension, intrinsics.  Reduced cervical neck range of motion.  Positive Lhermitte sign and Spurling's maneuver for typical shoulder and arm pain.    IMAGING:  Xrays including AP, Outlet and Axillary Lateral of RIGHT shoulder are ordered / images reviewed by me:   Subacromial spurring.  No significant DJD.    ASSESSMENT:      ICD-10-CM ICD-9-CM   1. Cervical radiculopathy  M54.12 723.4   2. Cervicalgia  M54.2 723.1   3. Chronic right shoulder pain  M25.511 719.41    G89.29 338.29       PLAN:     -Findings and treatment options were discussed with the patient.  Her typical shoulder and arm pain is provoked by cervical neck range of motion and provocative maneuvers.  Qualitatively, her symptoms are very similar " to what she experienced prior to her neck surgery.  It sounds like there was some resolution but now potentially some recurrence.  I think it would be a good idea for her to see her spine surgeon again.  Certainly there can be some degree of intrinsic shoulder pathology.  Could consider diagnostic injection/MRI in the future we are going to hold off on that for now.  -Medrol dosepack prescribed  -RTC as needed  -All questions answered    Procedures

## 2022-04-08 NOTE — PROGRESS NOTES
Two patient identifiers verified.  Allergies reviewed. Kenalog 40 mg IM administered to right dorsalgluteal per MD order.  Patient tolerated injection well; no redness, bleeding, or bruising noted to injection site.  Patient instructed to remain in clinic setting for 15 minutes.  Verbalizes understanding.

## 2022-04-08 NOTE — PROGRESS NOTES
Subjective:       Patient ID: Aileen Alegria is a 61 y.o. female.    Chief Complaint: Shoulder Pain (right), Knee Pain (right), and Asthma (S.o.b.)      60 yo female presents with complaint of right shoulder and right knee pain, hx of asthma which has been well controlled.     She has a medical history of left rotator cuff syndrome (saw Ortho on 10/27/2020), chronic right shoulder pain, chronic rhinitis, Asthma, environmental & seasonal allergies, nicotine dependence, type 2 diabetes diagnosed on 5/28/2021, hyperlipidemia, postmenopausal, s/p cervical spine surgery, s/p C/S.     She complains of right shoulder/ right arm pain. She thinks this is due to her rotator cuff. She had issues with left rotator cuff in the past; saw Ortho in [facility] for left shoulder.   She has right knee pain without swelling. No falls. No trauma.     She reports that since seeing an Allergist that her asthma is well controlled.    She has diabetes:  elevated A1c of 6.6 on 5/28/2021. She is not interested in starting medications.     The following portions of the patient's history were reviewed and updated as appropriate: allergies, current medications, past family history, past medical history, past social history, past surgical history and problem list.       Shoulder Pain   The pain is present in the right shoulder. This is a chronic (7-8 yrs) problem. The current episode started more than 1 month ago (2 months of exacerbation). There has been no history of extremity trauma. The problem occurs intermittently. The problem has been gradually worsening. The quality of the pain is described as aching. The pain is severe. Associated symptoms include numbness and stiffness. Pertinent negatives include no fever, headaches, joint swelling or tingling. The symptoms are aggravated by activity. She has tried NSAIDS, heat and rest (muscle relaxants, ASA) for the symptoms. The treatment provided mild relief. Family history includes arthritis. Her past  medical history is significant for diabetes. There is no history of Injuries to Extremity.   Knee Pain   The incident occurred more than 1 week ago (For years). The pain is present in the right knee. The quality of the pain is described as aching. The pain is at a severity of 8/10. The pain has been intermittent since onset. Associated symptoms include numbness. Pertinent negatives include no tingling. The symptoms are aggravated by weight bearing (climbing stairs). She has tried rest, heat, NSAIDs and acetaminophen for the symptoms. The treatment provided mild relief.   Asthma  There is no shortness of breath or wheezing. Pertinent negatives include no appetite change, chest pain, fever, headaches, myalgias, postnasal drip, rhinorrhea, sore throat or trouble swallowing. Her past medical history is significant for asthma.     Review of Systems   Constitutional: Negative for activity change, appetite change, chills, diaphoresis, fatigue, fever and unexpected weight change.   HENT: Negative for nasal congestion, dental problem, facial swelling, nosebleeds, postnasal drip, rhinorrhea, sore throat, tinnitus and trouble swallowing.    Eyes: Negative for pain, discharge, itching and visual disturbance.   Respiratory: Negative for apnea, chest tightness, shortness of breath, wheezing and stridor.    Cardiovascular: Negative for chest pain, palpitations and leg swelling.   Gastrointestinal: Negative for abdominal distention, abdominal pain, constipation, diarrhea, nausea, rectal pain and vomiting.   Endocrine: Negative for cold intolerance, heat intolerance and polyuria.   Genitourinary: Negative for difficulty urinating, dysuria, frequency, hematuria and urgency.   Musculoskeletal: Positive for arthralgias, back pain, gait problem, neck pain and stiffness. Negative for myalgias and neck stiffness.   Integumentary:  Negative for color change and rash.   Neurological: Positive for numbness. Negative for dizziness, tingling,  "tremors, seizures, syncope, facial asymmetry, weakness and headaches.   Hematological: Negative for adenopathy. Does not bruise/bleed easily.   Psychiatric/Behavioral: Negative for agitation, confusion, hallucinations, self-injury and suicidal ideas. The patient is not hyperactive.           Objective:       Vitals:    04/08/22 1051   BP: 138/72   BP Location: Right arm   Pulse: 98   Temp: 98.1 °F (36.7 °C)   SpO2: 97%   Weight: 66 kg (145 lb 8.1 oz)   Height: 5' 4" (1.626 m)     Physical Exam  Constitutional:       General: She is not in acute distress.     Appearance: She is well-developed. She is not diaphoretic.   HENT:      Head: Normocephalic and atraumatic.   Eyes:      General: No scleral icterus.        Right eye: No discharge.         Left eye: No discharge.      Conjunctiva/sclera: Conjunctivae normal.   Neck:      Thyroid: No thyromegaly.      Comments: Well healed midline posterior neck scar  Cardiovascular:      Rate and Rhythm: Normal rate and regular rhythm.      Heart sounds: Normal heart sounds. No murmur heard.    No friction rub. No gallop.   Pulmonary:      Effort: Pulmonary effort is normal. No respiratory distress.      Breath sounds: Normal breath sounds.   Chest:      Chest wall: No tenderness.   Abdominal:      General: Bowel sounds are normal.      Palpations: Abdomen is soft.      Tenderness: There is no rebound.   Musculoskeletal:      Cervical back: Normal range of motion and neck supple.      Comments: Musculoskeletal:  Antalgic gait and station.  No misalignment, no asymmetry, no crepitation, no defects, mild anterior  Tenderness in right knee joint line & right anterior shoulder tenderness, no masses, no effusions, decreased range of motion to overhead motion of right shoulder but normal ROM of right knee; no instability, no atrophy or abnormal strength or tone in the head, neck, spine, ribs, pelvis or extremities.   Skin:     General: Skin is warm.      Capillary Refill: Capillary " refill takes less than 2 seconds.      Findings: No rash.   Neurological:      Mental Status: She is alert and oriented to person, place, and time.      Cranial Nerves: No cranial nerve deficit.      Motor: No abnormal muscle tone.      Coordination: Coordination normal.      Deep Tendon Reflexes: Reflexes normal.   Psychiatric:         Thought Content: Thought content normal.         Judgment: Judgment normal.         Assessment:       1. Chronic pain of right knee    2. Chronic right shoulder pain    3. Cervical myelopathy    4. Type 2 diabetes mellitus with hyperglycemia, without long-term current use of insulin    5. Hyperlipidemia LDL goal <100    6. Moderate persistent asthma without complication    7. Environmental and seasonal allergies    8. Cigarette nicotine dependence without complication        Plan:       1. Chronic pain of right knee  -     Ambulatory referral/consult to Orthopedics; Future; Expected date: 04/15/2022  -     triamcinolone acetonide injection 40 mg  -     X-ray Knee Ortho Right; Future; Expected date: 04/08/2022  -     camphor-menthoL 0.2-3.5 % Gel; Apply 1 application topically 2 (two) times daily as needed (pain).  Dispense: 113.4 g; Refill: 3    2. Chronic right shoulder pain  -     triamcinolone acetonide injection 40 mg  -     X-ray Shoulder 2 or More Views Right; Future; Expected date: 04/08/2022  -     Ambulatory referral/consult to Orthopedics; Future; Expected date: 04/15/2022  -     camphor-menthoL 0.2-3.5 % Gel; Apply 1 application topically 2 (two) times daily as needed (pain).  Dispense: 113.4 g; Refill: 3    3. Cervical myelopathy  S/p surgery on 7/6/2020    4. Type 2 diabetes mellitus with hyperglycemia, without long-term current use of insulin  Saw diabetes specialist on 6/14/2021  Manged with lifestyle changes.  Instructed that steroid shot can transiently increase blood sugars    5. Hyperlipidemia LDL goal <100  Non adherent to atorvastatin  Better diabetic control  will help.  Encourage healthy lifestyle intervention through diet and exercise.    6. Moderate persistent asthma without complication  Asymptomatic. Continue inhaler therapy.  Avoid tobacco procedure.    7. Environmental and seasonal allergies  Follows with allergy  Good control to avoid asthma exacerbation    8. Cigarette nicotine dependence without complication  Abstinence from tobacco encouraged. Patient will let me know if she needs any assistance.    Disclaimer: This note has been generated using voice-recognition software. There may be typographical errors that have been missed during proof-reading

## 2022-04-10 ENCOUNTER — PATIENT OUTREACH (OUTPATIENT)
Dept: ADMINISTRATIVE | Facility: OTHER | Age: 62
End: 2022-04-10
Payer: MEDICARE

## 2022-04-11 ENCOUNTER — HOSPITAL ENCOUNTER (OUTPATIENT)
Dept: RADIOLOGY | Facility: HOSPITAL | Age: 62
Discharge: HOME | End: 2022-04-11
Attending: FAMILY MEDICINE
Payer: MEDICARE

## 2022-04-11 ENCOUNTER — OFFICE VISIT (OUTPATIENT)
Dept: SPORTS MEDICINE | Facility: CLINIC | Age: 62
End: 2022-04-11
Payer: MEDICARE

## 2022-04-11 VITALS
SYSTOLIC BLOOD PRESSURE: 135 MMHG | DIASTOLIC BLOOD PRESSURE: 86 MMHG | WEIGHT: 141.31 LBS | HEART RATE: 96 BPM | BODY MASS INDEX: 24.13 KG/M2 | HEIGHT: 64 IN

## 2022-04-11 DIAGNOSIS — M25.511 CHRONIC RIGHT SHOULDER PAIN: ICD-10-CM

## 2022-04-11 DIAGNOSIS — G89.29 CHRONIC RIGHT SHOULDER PAIN: ICD-10-CM

## 2022-04-11 DIAGNOSIS — M54.12 CERVICAL RADICULOPATHY: Primary | ICD-10-CM

## 2022-04-11 DIAGNOSIS — M54.2 CERVICALGIA: ICD-10-CM

## 2022-04-11 DIAGNOSIS — G89.29 CHRONIC PAIN OF RIGHT KNEE: ICD-10-CM

## 2022-04-11 DIAGNOSIS — M25.561 CHRONIC PAIN OF RIGHT KNEE: ICD-10-CM

## 2022-04-11 PROCEDURE — 73030 XR SHOULDER COMPLETE 2 OR MORE VIEWS RIGHT: ICD-10-PCS | Mod: 26,RT,, | Performed by: RADIOLOGY

## 2022-04-11 PROCEDURE — 3008F PR BODY MASS INDEX (BMI) DOCUMENTED: ICD-10-PCS | Mod: CPTII,S$GLB,, | Performed by: ORTHOPAEDIC SURGERY

## 2022-04-11 PROCEDURE — 99203 PR OFFICE/OUTPT VISIT, NEW, LEVL III, 30-44 MIN: ICD-10-PCS | Mod: S$GLB,,, | Performed by: ORTHOPAEDIC SURGERY

## 2022-04-11 PROCEDURE — 1159F MED LIST DOCD IN RCRD: CPT | Mod: CPTII,S$GLB,, | Performed by: ORTHOPAEDIC SURGERY

## 2022-04-11 PROCEDURE — 73030 X-RAY EXAM OF SHOULDER: CPT | Mod: 26,RT,, | Performed by: RADIOLOGY

## 2022-04-11 PROCEDURE — 73562 XR KNEE ORTHO RIGHT WITH FLEXION: ICD-10-PCS | Mod: 26,LT,, | Performed by: RADIOLOGY

## 2022-04-11 PROCEDURE — 99999 PR PBB SHADOW E&M-EST. PATIENT-LVL IV: CPT | Mod: PBBFAC,,, | Performed by: ORTHOPAEDIC SURGERY

## 2022-04-11 PROCEDURE — 73030 X-RAY EXAM OF SHOULDER: CPT | Mod: TC,PN,RT

## 2022-04-11 PROCEDURE — 99203 OFFICE O/P NEW LOW 30 MIN: CPT | Mod: S$GLB,,, | Performed by: ORTHOPAEDIC SURGERY

## 2022-04-11 PROCEDURE — 73562 X-RAY EXAM OF KNEE 3: CPT | Mod: TC,PN,LT

## 2022-04-11 PROCEDURE — 3075F PR MOST RECENT SYSTOLIC BLOOD PRESS GE 130-139MM HG: ICD-10-PCS | Mod: CPTII,S$GLB,, | Performed by: ORTHOPAEDIC SURGERY

## 2022-04-11 PROCEDURE — 3075F SYST BP GE 130 - 139MM HG: CPT | Mod: CPTII,S$GLB,, | Performed by: ORTHOPAEDIC SURGERY

## 2022-04-11 PROCEDURE — 3079F DIAST BP 80-89 MM HG: CPT | Mod: CPTII,S$GLB,, | Performed by: ORTHOPAEDIC SURGERY

## 2022-04-11 PROCEDURE — 73564 XR KNEE ORTHO RIGHT WITH FLEXION: ICD-10-PCS | Mod: 26,RT,, | Performed by: RADIOLOGY

## 2022-04-11 PROCEDURE — 3079F PR MOST RECENT DIASTOLIC BLOOD PRESSURE 80-89 MM HG: ICD-10-PCS | Mod: CPTII,S$GLB,, | Performed by: ORTHOPAEDIC SURGERY

## 2022-04-11 PROCEDURE — 73562 X-RAY EXAM OF KNEE 3: CPT | Mod: 26,LT,, | Performed by: RADIOLOGY

## 2022-04-11 PROCEDURE — 1159F PR MEDICATION LIST DOCUMENTED IN MEDICAL RECORD: ICD-10-PCS | Mod: CPTII,S$GLB,, | Performed by: ORTHOPAEDIC SURGERY

## 2022-04-11 PROCEDURE — 3008F BODY MASS INDEX DOCD: CPT | Mod: CPTII,S$GLB,, | Performed by: ORTHOPAEDIC SURGERY

## 2022-04-11 PROCEDURE — 99999 PR PBB SHADOW E&M-EST. PATIENT-LVL IV: ICD-10-PCS | Mod: PBBFAC,,, | Performed by: ORTHOPAEDIC SURGERY

## 2022-04-11 PROCEDURE — 73564 X-RAY EXAM KNEE 4 OR MORE: CPT | Mod: 26,RT,, | Performed by: RADIOLOGY

## 2022-04-11 RX ORDER — METHYLPREDNISOLONE 4 MG/1
TABLET ORAL
Qty: 21 EACH | Refills: 0 | Status: SHIPPED | OUTPATIENT
Start: 2022-04-11

## 2022-04-22 ENCOUNTER — PATIENT OUTREACH (OUTPATIENT)
Dept: ADMINISTRATIVE | Facility: HOSPITAL | Age: 62
End: 2022-04-22
Payer: MEDICARE

## 2022-04-22 NOTE — PROGRESS NOTES
PHN outreach for diabetic eye exam and colon cancer screening.  Requested records from [facility].

## 2022-04-22 NOTE — LETTER
AUTHORIZATION FOR RELEASE OF   CONFIDENTIAL INFORMATION    TO: [facility] Records,    We are seeing Aileen Alegria, date of birth 1960, in the clinic at [facility]. Danisha Mobley MD is the patient's PCP. Aileen Alegria has an outstanding lab/procedure at the time we reviewed her chart. In order to help keep her health information updated, she has authorized us to request the following medical record(s):        (  )  MAMMOGRAM                                      ( X )  COLONOSCOPY      (  )  PAP SMEAR                                          (  )  OUTSIDE LAB RESULTS     (  )  DEXA SCAN                                          ( X )  EYE EXAM            (  )  FOOT EXAM                                          (  )  ENTIRE RECORD     (  )  OUTSIDE IMMUNIZATIONS                 (  )  _______________         Please fax records to [facility], Tenille Ottley-Sharpe, MD, [phone]     If you have any questions, please contact Bernarda at [phone].           Patient Name: Aileen Alegria  : 1960  Patient Phone #: [phone]

## 2022-05-30 ENCOUNTER — PATIENT MESSAGE (OUTPATIENT)
Dept: ADMINISTRATIVE | Facility: HOSPITAL | Age: 62
End: 2022-05-30
Payer: MEDICARE

## 2022-06-02 ENCOUNTER — LAB VISIT (OUTPATIENT)
Dept: LAB | Facility: HOSPITAL | Age: 62
End: 2022-06-02
Attending: FAMILY MEDICINE
Payer: MEDICARE

## 2022-06-02 DIAGNOSIS — E11.9 TYPE 2 DIABETES MELLITUS WITHOUT COMPLICATION, WITHOUT LONG-TERM CURRENT USE OF INSULIN: ICD-10-CM

## 2022-06-02 LAB
ANION GAP SERPL CALC-SCNC: 9 MMOL/L (ref 8–16)
BUN SERPL-MCNC: 7 MG/DL (ref 6–20)
CALCIUM SERPL-MCNC: 9.3 MG/DL (ref 8.7–10.5)
CHLORIDE SERPL-SCNC: 106 MMOL/L (ref 95–110)
CO2 SERPL-SCNC: 22 MMOL/L (ref 23–29)
CREAT SERPL-MCNC: 0.9 MG/DL (ref 0.5–1.4)
EST. GFR  (AFRICAN AMERICAN): >60 ML/MIN/1.73 M^2
EST. GFR  (NON AFRICAN AMERICAN): >60 ML/MIN/1.73 M^2
ESTIMATED AVG GLUCOSE: 157 MG/DL (ref 68–131)
GLUCOSE SERPL-MCNC: 236 MG/DL (ref 70–110)
HBA1C MFR BLD: 7.1 % (ref 4–5.6)
POTASSIUM SERPL-SCNC: 3.4 MMOL/L (ref 3.5–5.1)
SODIUM SERPL-SCNC: 137 MMOL/L (ref 136–145)

## 2022-06-02 PROCEDURE — 83036 HEMOGLOBIN GLYCOSYLATED A1C: CPT | Performed by: FAMILY MEDICINE

## 2022-06-02 PROCEDURE — 80048 BASIC METABOLIC PNL TOTAL CA: CPT | Performed by: FAMILY MEDICINE

## 2022-06-02 PROCEDURE — 36415 COLL VENOUS BLD VENIPUNCTURE: CPT | Mod: PN | Performed by: FAMILY MEDICINE

## 2022-06-07 ENCOUNTER — TELEPHONE (OUTPATIENT)
Dept: PRIMARY CARE CLINIC | Facility: CLINIC | Age: 62
End: 2022-06-07

## 2022-06-07 ENCOUNTER — HOSPITAL ENCOUNTER (OUTPATIENT)
Dept: RADIOLOGY | Facility: HOSPITAL | Age: 62
Discharge: HOME | End: 2022-06-07
Attending: FAMILY MEDICINE
Payer: MEDICARE

## 2022-06-07 ENCOUNTER — OFFICE VISIT (OUTPATIENT)
Dept: PRIMARY CARE CLINIC | Facility: CLINIC | Age: 62
End: 2022-06-07
Payer: MEDICARE

## 2022-06-07 VITALS
RESPIRATION RATE: 18 BRPM | HEIGHT: 64 IN | OXYGEN SATURATION: 99 % | TEMPERATURE: 98 F | HEART RATE: 87 BPM | SYSTOLIC BLOOD PRESSURE: 127 MMHG | BODY MASS INDEX: 23.68 KG/M2 | DIASTOLIC BLOOD PRESSURE: 87 MMHG | WEIGHT: 138.69 LBS

## 2022-06-07 DIAGNOSIS — J30.89 ENVIRONMENTAL AND SEASONAL ALLERGIES: ICD-10-CM

## 2022-06-07 DIAGNOSIS — M25.552 CHRONIC LEFT HIP PAIN: ICD-10-CM

## 2022-06-07 DIAGNOSIS — G89.29 CHRONIC LEFT HIP PAIN: ICD-10-CM

## 2022-06-07 DIAGNOSIS — G89.29 CHRONIC PAIN OF BOTH KNEES: ICD-10-CM

## 2022-06-07 DIAGNOSIS — E78.5 HYPERLIPIDEMIA DUE TO TYPE 2 DIABETES MELLITUS: ICD-10-CM

## 2022-06-07 DIAGNOSIS — J45.40 MODERATE PERSISTENT ASTHMA WITHOUT COMPLICATION: ICD-10-CM

## 2022-06-07 DIAGNOSIS — M25.562 CHRONIC PAIN OF BOTH KNEES: ICD-10-CM

## 2022-06-07 DIAGNOSIS — G89.4 CHRONIC PAIN SYNDROME: ICD-10-CM

## 2022-06-07 DIAGNOSIS — F17.210 CIGARETTE NICOTINE DEPENDENCE WITHOUT COMPLICATION: ICD-10-CM

## 2022-06-07 DIAGNOSIS — E11.69 HYPERLIPIDEMIA DUE TO TYPE 2 DIABETES MELLITUS: ICD-10-CM

## 2022-06-07 DIAGNOSIS — Z00.00 ANNUAL PHYSICAL EXAM: Primary | ICD-10-CM

## 2022-06-07 DIAGNOSIS — M54.12 CERVICAL RADICULOPATHY: ICD-10-CM

## 2022-06-07 DIAGNOSIS — M25.561 CHRONIC PAIN OF BOTH KNEES: ICD-10-CM

## 2022-06-07 DIAGNOSIS — E11.9 TYPE 2 DIABETES MELLITUS WITHOUT COMPLICATION, WITHOUT LONG-TERM CURRENT USE OF INSULIN: ICD-10-CM

## 2022-06-07 DIAGNOSIS — Z98.890 H/O CERVICAL SPINE SURGERY: ICD-10-CM

## 2022-06-07 PROBLEM — E11.65 TYPE 2 DIABETES MELLITUS WITH HYPERGLYCEMIA, WITHOUT LONG-TERM CURRENT USE OF INSULIN: Status: RESOLVED | Noted: 2022-04-08 | Resolved: 2022-06-07

## 2022-06-07 LAB
ALBUMIN/CREAT UR: 4.9 UG/MG (ref 0–30)
CREAT UR-MCNC: 283 MG/DL (ref 15–325)
MICROALBUMIN UR DL<=1MG/L-MCNC: 14 UG/ML

## 2022-06-07 PROCEDURE — 1159F PR MEDICATION LIST DOCUMENTED IN MEDICAL RECORD: ICD-10-PCS | Mod: CPTII,S$GLB,, | Performed by: FAMILY MEDICINE

## 2022-06-07 PROCEDURE — 99396 PREV VISIT EST AGE 40-64: CPT | Mod: 25,GY,S$GLB, | Performed by: FAMILY MEDICINE

## 2022-06-07 PROCEDURE — 3008F BODY MASS INDEX DOCD: CPT | Mod: CPTII,S$GLB,, | Performed by: FAMILY MEDICINE

## 2022-06-07 PROCEDURE — 3051F HG A1C>EQUAL 7.0%<8.0%: CPT | Mod: CPTII,S$GLB,, | Performed by: FAMILY MEDICINE

## 2022-06-07 PROCEDURE — 3079F PR MOST RECENT DIASTOLIC BLOOD PRESSURE 80-89 MM HG: ICD-10-PCS | Mod: CPTII,S$GLB,, | Performed by: FAMILY MEDICINE

## 2022-06-07 PROCEDURE — 99999 PR PBB SHADOW E&M-EST. PATIENT-LVL V: ICD-10-PCS | Mod: PBBFAC,,, | Performed by: FAMILY MEDICINE

## 2022-06-07 PROCEDURE — 1160F PR REVIEW ALL MEDS BY PRESCRIBER/CLIN PHARMACIST DOCUMENTED: ICD-10-PCS | Mod: CPTII,S$GLB,, | Performed by: FAMILY MEDICINE

## 2022-06-07 PROCEDURE — 3074F SYST BP LT 130 MM HG: CPT | Mod: CPTII,S$GLB,, | Performed by: FAMILY MEDICINE

## 2022-06-07 PROCEDURE — 99499 UNLISTED E&M SERVICE: CPT | Mod: S$GLB,,, | Performed by: FAMILY MEDICINE

## 2022-06-07 PROCEDURE — 3074F PR MOST RECENT SYSTOLIC BLOOD PRESSURE < 130 MM HG: ICD-10-PCS | Mod: CPTII,S$GLB,, | Performed by: FAMILY MEDICINE

## 2022-06-07 PROCEDURE — 99499 RISK ADDL DX/OHS AUDIT: ICD-10-PCS | Mod: S$GLB,,, | Performed by: FAMILY MEDICINE

## 2022-06-07 PROCEDURE — 3079F DIAST BP 80-89 MM HG: CPT | Mod: CPTII,S$GLB,, | Performed by: FAMILY MEDICINE

## 2022-06-07 PROCEDURE — 3051F PR MOST RECENT HEMOGLOBIN A1C LEVEL 7.0 - < 8.0%: ICD-10-PCS | Mod: CPTII,S$GLB,, | Performed by: FAMILY MEDICINE

## 2022-06-07 PROCEDURE — 99999 PR PBB SHADOW E&M-EST. PATIENT-LVL V: CPT | Mod: PBBFAC,,, | Performed by: FAMILY MEDICINE

## 2022-06-07 PROCEDURE — 82570 ASSAY OF URINE CREATININE: CPT | Performed by: FAMILY MEDICINE

## 2022-06-07 PROCEDURE — 99396 PR PREVENTIVE VISIT,EST,40-64: ICD-10-PCS | Mod: 25,GY,S$GLB, | Performed by: FAMILY MEDICINE

## 2022-06-07 PROCEDURE — 1160F RVW MEDS BY RX/DR IN RCRD: CPT | Mod: CPTII,S$GLB,, | Performed by: FAMILY MEDICINE

## 2022-06-07 PROCEDURE — 96372 THER/PROPH/DIAG INJ SC/IM: CPT | Mod: S$GLB,,, | Performed by: FAMILY MEDICINE

## 2022-06-07 PROCEDURE — 1159F MED LIST DOCD IN RCRD: CPT | Mod: CPTII,S$GLB,, | Performed by: FAMILY MEDICINE

## 2022-06-07 PROCEDURE — 96372 PR INJECTION,THERAP/PROPH/DIAG2ST, IM OR SUBCUT: ICD-10-PCS | Mod: S$GLB,,, | Performed by: FAMILY MEDICINE

## 2022-06-07 PROCEDURE — 3008F PR BODY MASS INDEX (BMI) DOCUMENTED: ICD-10-PCS | Mod: CPTII,S$GLB,, | Performed by: FAMILY MEDICINE

## 2022-06-07 PROCEDURE — 82043 UR ALBUMIN QUANTITATIVE: CPT | Performed by: FAMILY MEDICINE

## 2022-06-07 RX ORDER — TRIAMCINOLONE ACETONIDE 40 MG/ML
40 INJECTION, SUSPENSION INTRA-ARTICULAR; INTRAMUSCULAR
Status: COMPLETED | OUTPATIENT
Start: 2022-06-07 | End: 2022-06-07

## 2022-06-07 RX ORDER — ATORVASTATIN CALCIUM 10 MG/1
10 TABLET, FILM COATED ORAL NIGHTLY
Qty: 90 TABLET | Refills: 3 | Status: SHIPPED | OUTPATIENT
Start: 2022-06-07 | End: 2023-06-07

## 2022-06-07 RX ORDER — DULOXETIN HYDROCHLORIDE 30 MG/1
30 CAPSULE, DELAYED RELEASE ORAL DAILY
Qty: 30 CAPSULE | Refills: 11 | Status: SHIPPED | OUTPATIENT
Start: 2022-06-07 | End: 2023-06-07

## 2022-06-07 RX ADMIN — TRIAMCINOLONE ACETONIDE 40 MG: 40 INJECTION, SUSPENSION INTRA-ARTICULAR; INTRAMUSCULAR at 09:06

## 2022-06-07 NOTE — PROGRESS NOTES
Two patient identifiers verified.  Allergies reviewed.   Kenalog 40 IM administered to left gluteal per MD order.  Patient tolerated injection well; no redness, bleeding, or bruising noted to injection site.  Patient instructed to remain in clinic setting for 15 minutes.  Verbalizes understanding.

## 2022-06-07 NOTE — TELEPHONE ENCOUNTER
Patient has been informed, and verbalized understanding.  She is agreeable to starting a statin. Please send to [facility] [address].     She will schedule recommended appts.

## 2022-06-07 NOTE — TELEPHONE ENCOUNTER
----- Message from Danisha Mobley MD sent at 6/7/2022  3:05 PM CDT -----  I recommend that she sees the diabetes educator and follow up with Enedina Schaffer the diabetic specialist. Let me know if she agrees to start a cholesterol medication at bedtime.    Hello,    Normal liver and kidney function test.    There is a tool we use which calculates your 10-year risk of heart disease/ stroke. Your risk is higher than I would like and it is recommended that you start a cholesterol medication based on your calculated score. This would improve your life expectancy.    The most common side effect of cholesterol medication is muscle aches; please keep in mind that most people are able to tolerate the cholesterol medication (called a statin) well.    Taking the cholesterol medication, called a statin at bedtime minimizes the risk of muscle aches and pains with the medication.     Taking the cholesterol medication, called a statin does not replace the need to diet and exercise.    If you agree to starting the cholesterol medication; please let us know. I could send a prescription to your pharmacy (may substitute based on affordability).

## 2022-06-07 NOTE — PROGRESS NOTES
"CC: left hip pain, bilateral knee pain    61 y.o. Female presents today for evaluation of her left hip pain and bilateral knee pain. Pt reports she has had knee pain for about 7 years and intermittent left hip pain for a couple of months. Pt reports gradual onset. Pt reports her pain is 7/10 today. Pt reports increased pain with stairs. Pt localizes pain to global hip (c-sign) and anteromedial/anterolateral aspect of knees bilaterally. Pt reports popping in her knees. Pt denies numbness/tingling.     Side: left hip, bilateral knee   Problem: pain  Pain Score: 7/10  SYMPTOMS:   Time of onset: 7 years for knees, couple of months for hip  Trauma, injury: gradual onst  Pain location:  global hip (c-sign) and anteromedial/anterolateral aspect of knees bilaterally    C-sign: yes    Radiation past knee:  no    Exacerbating factors / difficult actions:    Nocturnal pain lying with ipsilateral side down: yes    Pivoting: unsure    Putting on shoes / socks: yes    Getting into / out of cars: no    Getting up / down from chairs: yes    Known back problems: "nerve issues" in lumbar spine  Weakness: no  Numbness: none  Mechanical symptoms:     Clicking, catching: popping    Snapping internal: none    Snapping external: none    INTERVENTIONS:   Medications tried: duloxetine  Physical therapy: fPT 6 months to a year ago, no relief  Injections: IM kenalog injection 6/7/22    RELEVANT HISTORY:   Imaging to date: 06/14/22, 04/11/22    Occupation: sits at work    REVIEW OF SYSTEMS:   Constitution: Patient denies fever or chills.  Eyes: Patient denies eye pain or vision changes.  HEENT: Patient denies ear pain, sore throat, or nasal discharge.  CVS: Patient denies chest pain.  Lungs: Patient denies shortness of breath or cough.  Abdomen: Patient denies any stomach pain, nausea, vomiting, or diarrhea  Skin: Patient denies skin rash or itching.    Musculoskeletal: Patient denies recent injuries or trauma.  Neuro: Patient denies any " numbness or tingling in upper or lower extremities.  Psych: Patient denies any current anxiety or nervousness.    PAST MEDICAL HISTORY:   Past Medical History:   Diagnosis Date    Allergy     Environmental and seasonal allergies     Moderate persistent asthma without complication 2022       PAST SURGICAL HISTORY:  Past Surgical History:   Procedure Laterality Date     SECTION      NECK SURGERY  2020    C 2-C7       FAMILY HISTORY:  Family History   Problem Relation Age of Onset    Lung cancer Mother     Dementia Father     Hypertension Father     Prostate cancer Father     Hypertension Sister     No Known Problems Brother        SOCIAL HISTORY:  Social History     Socioeconomic History    Marital status: Single   Tobacco Use    Smoking status: Current Every Day Smoker     Packs/day: 1.00     Types: Cigarettes    Smokeless tobacco: Never Used   Substance and Sexual Activity    Alcohol use: Yes     Comment: socially    Sexual activity: Not Currently     Social Determinants of Health     Financial Resource Strain: Low Risk     Difficulty of Paying Living Expenses: Not hard at all   Food Insecurity: No Food Insecurity    Worried About Running Out of Food in the Last Year: Never true    Ran Out of Food in the Last Year: Never true   Transportation Needs: No Transportation Needs    Lack of Transportation (Medical): No    Lack of Transportation (Non-Medical): No   Stress: No Stress Concern Present    Feeling of Stress : Only a little   Social Connections: Moderately Isolated    Frequency of Communication with Friends and Family: More than three times a week    Frequency of Social Gatherings with Friends and Family: Once a week    Attends Mandaeism Services: More than 4 times per year    Active Member of Clubs or Organizations: No    Attends Club or Organization Meetings: Never    Marital Status: Never    Housing Stability: Low Risk     Unable to Pay for Housing in the  Last Year: No    Number of Places Lived in the Last Year: 1    Unstable Housing in the Last Year: No       MEDICATIONS:     Current Outpatient Medications:     albuterol (ACCUNEB) 1.25 mg/3 mL Nebu, USE 1 VIAL PER NEBULIZER EVERY 6 HOURS AS NEEDED, Disp: 225 mL, Rfl: 1    albuterol (PROVENTIL/VENTOLIN HFA) 90 mcg/actuation inhaler, Inhale 2 puffs into the lungs every 4 (four) hours as needed for Wheezing (shortness of breath). Rescue, Disp: 20.1 g, Rfl: 11    atorvastatin (LIPITOR) 10 MG tablet, Take 1 tablet (10 mg total) by mouth every evening., Disp: 90 tablet, Rfl: 3    budesonide-formoterol 160-4.5 mcg (SYMBICORT) 160-4.5 mcg/actuation HFAA, Inhale 2 puffs into the lungs 2 (two) times daily as needed (shortness of breath or chest tightness). See written instructions., Disp: 20.4 g, Rfl: 1    camphor-menthoL 0.2-3.5 % Gel, Apply 1 application topically 2 (two) times daily as needed (pain)., Disp: 113.4 g, Rfl: 3    cetirizine (ZYRTEC) 10 MG tablet, Take 1 tablet (10 mg total) by mouth once daily. 2 tablets every morning for itching, Disp: 90 tablet, Rfl: 3    DULoxetine (CYMBALTA) 30 MG capsule, Take 1 capsule (30 mg total) by mouth once daily., Disp: 30 capsule, Rfl: 11    famotidine (PEPCID) 20 MG tablet, Take 20 mg by mouth once daily., Disp: , Rfl:     levocetirizine (XYZAL) 5 MG tablet, Take 1 tablet (5 mg total) by mouth every evening., Disp: 30 tablet, Rfl: 11    methocarbamoL (ROBAXIN) 500 MG Tab, SMARTSI Tablet(s) By Mouth Every 12 Hours, Disp: , Rfl:     methylPREDNISolone (MEDROL DOSEPACK) 4 mg tablet, use as directed, Disp: 21 each, Rfl: 0    mometasone (NASONEX) 50 mcg/actuation nasal spray, 2 sprays by Nasal route 2 (two) times daily., Disp: 2 each, Rfl: 5    montelukast (SINGULAIR) 10 mg tablet, TK 1 T PO QD FOR ASTHMA OR ALLERGIES, Disp: , Rfl:     aspirin (ECOTRIN) 81 MG EC tablet, Take 1 tablet (81 mg total) by mouth once daily., Disp: 90 tablet, Rfl: 3    buPROPion  "(WELLBUTRIN SR) 150 MG TBSR 12 hr tablet, Take 1 tablet (150 mg total) by mouth once daily for 3 days, THEN 1 tablet (150 mg total) 2 (two) times daily., Disp: 177 tablet, Rfl: 0    cyclobenzaprine (FLEXERIL) 10 MG tablet, , Disp: , Rfl:     meloxicam (MOBIC) 15 MG tablet, Take 15 mg by mouth once daily., Disp: , Rfl:     naproxen (NAPROSYN) 500 MG tablet, TK 1 T PO BID PRF PAIN, Disp: , Rfl:     traMADoL (ULTRAM) 50 mg tablet, SMARTSI Tablet(s) By Mouth Every 12 Hours PRN, Disp: , Rfl:     ALLERGIES:   Review of patient's allergies indicates:   Allergen Reactions    Diphenhydramine hcl Anaphylaxis      IMAGIN. Knee X-ray ordered due to right knee pain. 4 views taken 22.   2. X-ray images were reviewed personally by me and then directly with patient.  3. FINDINGS: No significant degree of tibiofemoral or patellofemoral joint space narrowing.  Osseous structures are unremarkable, with no evidence of recent or healing fracture or lytic destructive process appreciated.  Lateral projection of the right knee demonstrates no evidence of a joint effusion.    4. IMPRESSION:  No significant abnormality.    PHYSICAL EXAMINATION:  /89   Ht 5' 4" (1.626 m)   Wt 62.6 kg (138 lb 0.1 oz)   BMI 23.69 kg/m²   Vitals signs and nursing note have been reviewed.    General: In no acute distress, well developed, well nourished, no diaphoresis  Eyes: EOM full and smooth, no eye redness or discharge  HEENT: normocephalic and atraumatic, neck supple, trachea midline, no nasal discharge  Cardiovascular: no LE edema  Lungs: respirations non-labored, no conversational dyspnea   Neuro: AAOx3, CN2-12 grossly intact  Skin: No rashes, warm and dry  Psychiatric: cooperative, pleasant, mood and affect appropriate for age    Left Hip:   Gait: snl    Inspection/Palpation:   -Deformities     TTP:  -Greater trochanter  -Abductors  -ASIS  -AIIS   -Ischial tuberosity/hamstring origin  -Mid substance " hamstring  -SIJ  -ITB    ROM (* = with pain):   Flexion: 100°  Extension: wnl  Popliteal Angle: 45°  Internal rotation seated: 45°  External rotation seated: 60°      Functional:   -Log roll   -NIKO  -FADIR  -Figure-4  -Scour/Circumduction with pressure  -Edil Test  -Snapping int  -Snapping ext  -Susan's Sign  -Gaenslen's  -Stinchfield test  -Madison  +Lasegue's   +SLR    Strength/Functional:   4+/5 in glut med / abductors    4+/5 in hamstrings    Bilateral Knee:   Gait: wnl    Inspection/Palpation:   -Rubor   -Calor  -Effusion   -Patella ballotable   -Patellar apprehension  -Retinacular tenderness   -Patellar crepitus   Patellar tilt grossly normal     TTP at:  -Joint line   -MCL   -LCL   -Popliteal region   -Quad tendon   -Patella  -Pat tendon  -Pat border  -Med condyle   -Lat condyle   -Pes   -Prox fibula   -Tib tub  -Gerdy's tubercle  -Distal Hamstring tendons  -Proximal Hamstrings/Ischial tuberosity  -ITB    ROM:   Extension: 0°   Flexion:140°   Popliteal Angle: 45°   -Discomfort w/ full flex   -Bounce-home discomfort     Ligamentous:   -Ant drawer   -Post drawer   -Lachman's   Good endpoints & no pain w/ valgus & varus stress    Meniscal:  -Sung's   -José Miguel   -Pain w/ squat     Other:  -Patellar apprehension  -Patellar grind  -Chairez's   -J sign  -Madison's  Abductors     IMAGIN. Hip X-ray ordered due to left hip pain. 2 views taken today.   2. X-ray images were reviewed personally by me and then directly with patient.  3. FINDINGS: No fracture or dislocation.  Hip cartilage space is maintained.  No suspicious appearing lytic or blastic lesions.    4. IMPRESSION:   No acute osseous abnormalities appreciated.    IMAGIN. Knee X-ray ordered due to left knee pain. 4 views taken today.   2. X-ray images were reviewed personally by me and then directly with patient.  3. FINDINGS: No bone, joint, or soft tissue abnormality is seen    4. IMPRESSION:  No acute osseous abnormalities  appreciated.    ASSESSMENT:      ICD-10-CM ICD-9-CM   1. Chronic left hip pain  M25.552 719.45    G89.29 338.29   2. Bilateral chronic knee pain  M25.561 719.46    M25.562 338.29    G89.29    3. Lumbar radiculopathy, chronic  M54.16 724.4         PLAN:  Patient history, physical exam findings, and imaging my differential diagnosis includes lumbar radiculopathy.  Given patient's history with her neck in the overwhelming symptomatology associated Lasegue's and  straight leg raise, would like to move forward with workup consisting of lumbar spine x-ray, lumbar spine MRI, EMG and nerve conduction study.  Follow up after studies.    Future planning includes - lumbar x-ray, lumbar spine MRI, EMG and nerve conduction study    All questions were answered to the best of my ability and all concerns were addressed at this time.    Follow up for above, or sooner if needed.      This note is dictated using the M*Modal Fluency Direct word recognition program. There are word recognition mistakes that are occasionally missed on review.

## 2022-06-07 NOTE — PROGRESS NOTES
Subjective:       Patient ID: Aileen Alegria is a 61 y.o. female.    Chief Complaint: Annual Exam      60 yo female presents for annual physical exam.       She has a medical history of cervical radiculopathy, left rotator cuff syndrome (saw Ortho on 10/27/2020), chronic right shoulder pain, chronic rhinitis, Asthma, environmental & seasonal allergies, nicotine dependence, type 2 diabetes diagnosed on 5/28/2021, hyperlipidemia, postmenopausal, s/p cervical spine surgery, s/p C/S.    She complains of bilateral knee pain with worsening when climbing stairs (recently had right knee X-ray which showed no severe bone abnormalities) , left hip pain. No trauma. No swelling. No weakness or paralysis.  She has seen orthopedic for right shoulder pain which was thought to be related to cervical radiculopathy; she was advised to see spine clinic with return to ortho if right shoulder pain persists to then further consider diagnostic injection/ MRI.   She used to see pain management in the past.  No relief with Biofreeze, ibupforen, naproxen, mobic Voltaren gel, Gabapentin, Flexeril, Robaxin.    She has diabetes:  elevated A1c of 6.6 on 5/28/2021 and again increased to 7.1 on 6/2/22. She is not interested in starting medications. Lost to follow up with Enedina Schaffer our diabetes specialist.    Lipid disorders/ASCVD risk (ages >/= 45 or >/= 20 if increased risk ): Ordered  DM (>45y yearly or if obese, HTN): A1c above goal  Eye exam: states she will fax records over  Breast Cancer (40-50y discretion of pt, 50-74y every 1-2 years): Up to date  Cervical Cancer (Pap Smear ages 21-65 every 3 years or Pap + HPV q5 years after 30 years of age): Up to date  Next colonoscopy due in 2023    The following portions of the patient's history were reviewed and updated as appropriate: allergies, current medications, past family history, past medical history, past social history, past surgical history and problem list.       Shoulder Pain   The  pain is present in the right shoulder. This is a chronic (7-8 yrs) problem. The current episode started more than 1 month ago (2 months of exacerbation). There has been no history of extremity trauma. The problem occurs intermittently. The problem has been gradually worsening. The quality of the pain is described as aching. The pain is severe. Associated symptoms include stiffness. Pertinent negatives include no fever, headaches, joint swelling, numbness or tingling. The symptoms are aggravated by activity. She has tried NSAIDS, heat and rest (muscle relaxants, ASA) for the symptoms. The treatment provided mild relief. Family history includes arthritis. Her past medical history is significant for diabetes. There is no history of Injuries to Extremity.   Knee Pain   The incident occurred more than 1 week ago (For years). The pain is present in the right knee. The quality of the pain is described as aching. The pain is at a severity of 8/10. The pain has been intermittent since onset. Pertinent negatives include no numbness or tingling. The symptoms are aggravated by weight bearing (climbing stairs). She has tried rest, heat, NSAIDs and acetaminophen for the symptoms. The treatment provided mild relief.   Asthma  There is no shortness of breath or wheezing. Pertinent negatives include no appetite change, chest pain, fever, headaches, myalgias, postnasal drip, rhinorrhea, sore throat or trouble swallowing. Her past medical history is significant for asthma.     Review of Systems   Constitutional: Negative for activity change, appetite change, chills, diaphoresis, fatigue, fever and unexpected weight change.   HENT: Negative for nasal congestion, dental problem, facial swelling, nosebleeds, postnasal drip, rhinorrhea, sore throat, tinnitus and trouble swallowing.    Eyes: Negative for pain, discharge, itching and visual disturbance.   Respiratory: Negative for apnea, chest tightness, shortness of breath, wheezing and  "stridor.    Cardiovascular: Negative for chest pain, palpitations and leg swelling.   Gastrointestinal: Negative for abdominal distention, abdominal pain, constipation, diarrhea, nausea, rectal pain and vomiting.   Endocrine: Negative for cold intolerance, heat intolerance and polyuria.   Genitourinary: Negative for difficulty urinating, dysuria, frequency, hematuria and urgency.   Musculoskeletal: Positive for arthralgias, back pain, gait problem, neck pain and stiffness. Negative for myalgias and neck stiffness.   Integumentary:  Negative for color change and rash.   Neurological: Negative for dizziness, tingling, tremors, seizures, syncope, facial asymmetry, weakness, numbness and headaches.   Hematological: Negative for adenopathy. Does not bruise/bleed easily.   Psychiatric/Behavioral: Negative for agitation, confusion, hallucinations, self-injury and suicidal ideas. The patient is not hyperactive.           Objective:       Vitals:    06/07/22 0834   BP: 127/87   BP Location: Right arm   Patient Position: Sitting   BP Method: Medium (Manual)   Pulse: 87   Resp: 18   Temp: 97.8 °F (36.6 °C)   SpO2: 99%   Weight: 62.9 kg (138 lb 10.7 oz)   Height: 5' 4" (1.626 m)     Physical Exam  Constitutional:       General: She is not in acute distress.     Appearance: She is well-developed. She is not diaphoretic.   HENT:      Head: Normocephalic and atraumatic.   Eyes:      General: No scleral icterus.        Right eye: No discharge.         Left eye: No discharge.      Conjunctiva/sclera: Conjunctivae normal.   Neck:      Thyroid: No thyromegaly.      Comments: Well healed midline posterior neck scar  Cardiovascular:      Rate and Rhythm: Normal rate and regular rhythm.      Heart sounds: Normal heart sounds. No murmur heard.    No friction rub. No gallop.   Pulmonary:      Effort: Pulmonary effort is normal. No respiratory distress.      Breath sounds: Normal breath sounds.   Chest:      Chest wall: No tenderness. "   Abdominal:      General: Bowel sounds are normal.      Palpations: Abdomen is soft.      Tenderness: There is no rebound.   Musculoskeletal:      Cervical back: Normal range of motion and neck supple.      Comments: Musculoskeletal:  Antalgic gait and station.  No misalignment, no asymmetry, no crepitation, no defects, mild anterior  Tenderness in bilateral knee joint line but no tenderness of left hip, no masses, no effusions, decreased range of motion; no instability, no atrophy or abnormal strength or tone in the head, neck, spine, ribs, pelvis or extremities.   Skin:     General: Skin is warm.      Capillary Refill: Capillary refill takes less than 2 seconds.      Findings: No rash.   Neurological:      Mental Status: She is alert and oriented to person, place, and time.      Cranial Nerves: No cranial nerve deficit.      Motor: No abnormal muscle tone.      Coordination: Coordination normal.      Deep Tendon Reflexes: Reflexes normal.   Psychiatric:         Thought Content: Thought content normal.         Judgment: Judgment normal.         Assessment:       1. Annual physical exam    2. Chronic pain of both knees    3. Chronic left hip pain    4. Chronic pain syndrome    5. Cervical radiculopathy    6. H/O cervical spine surgery    7. Hyperlipidemia due to type 2 diabetes mellitus    8. Type 2 diabetes mellitus without complication, without long-term current use of insulin    9. Moderate persistent asthma without complication    10. Environmental and seasonal allergies    11. Cigarette nicotine dependence without complication        Plan:       1. Annual physical exam  Age appropriate prevention guidelines implemented, unless patient refused  Encourage Advanced directives  Labs ordered   Immunizations reviewed. Encourage yearly influenza vaccine.  Patient Counseling:  --Nutrition: Encourage healthy food choices, adequate water intake, moderate sodium/caffeine intake, diet low in saturated fat and cholesterol.  Importance of caloric balance and sufficient intake of fresh fruits, vegetables, fiber, calcium, iron, and 1 mg of folate supplement per day (for females capable of pregnancy).  --Exercise: Stressed the importance of regular exercise.   --Substance Abuse: Abstinence from tobacco products. No more than 2 alcoholic drinks per day in men or 1 alcoholic drink per day in women. Avoid illicit drugs, driving or other dangerous activities under the influence. In the event of abuse, treatment offered.   --Sexuality: Safe sex practices to avoid sexually transmitted diseases; careful partner selection, use of condoms and contraceptive alternatives, avoidance of unintended pregnancy in fertile women of child-bearing age  --Injury prevention: encourage safety belts, safety helmets, smoke detector.  --Dental health: Discussed importance of regular tooth brushing X2 daily, flossing X1 daily, and routine dental visits.  --Encourage use of sun screen, wear sun protective clothing  --Encourage routine eye exams    2. Chronic pain of both knees  -     X-ray Knee Ortho Left; Future; Expected date: 06/07/2022  -     Ambulatory referral/consult to Orthopedics; Future; Expected date: 06/14/2022    3. Chronic left hip pain  -     X-Ray Hip 2 or 3 views Left (with Pelvis when performed); Future; Expected date: 06/07/2022  -     Ambulatory referral/consult to Orthopedics; Future; Expected date: 06/14/2022    4. Chronic pain syndrome  -     Ambulatory referral/consult to Pain Clinic; Future; Expected date: 06/14/2022  -     DULoxetine (CYMBALTA) 30 MG capsule; Take 1 capsule (30 mg total) by mouth once daily.  Dispense: 30 capsule; Refill: 11    5. Cervical radiculopathy  -     Ambulatory referral/consult to Pain Clinic; Future; Expected date: 06/14/2022    6. H/O cervical spine surgery  Follow up with spine clinic    7. Hyperlipidemia due to type 2 diabetes mellitus  -     Lipid Panel; Future  -     Comprehensive Metabolic Panel; Future;  Expected date: 06/07/2022    8. Type 2 diabetes mellitus without complication, without long-term current use of insulin  -     Ambulatory referral/consult to Diabetes Education; Future; Expected date: 06/14/2022  -     Microalbumin/Creatinine Ratio, Urine  Lost to follow up with our diabetic specialist.    9. Moderate persistent asthma without complication  On inhaler therapy    10. Environmental and seasonal allergies  Manages conservatively    11. Cigarette nicotine dependence without complication  Abstinence from all tobacco products strongly encouraged.  Patient will let me know if she needs any assistance in smoking cessation.    Disclaimer: This note has been generated using voice-recognition software. There may be typographical errors that have been missed during proof-reading

## 2022-06-08 ENCOUNTER — TELEPHONE (OUTPATIENT)
Dept: PRIMARY CARE CLINIC | Facility: CLINIC | Age: 62
End: 2022-06-08
Payer: MEDICARE

## 2022-06-08 NOTE — TELEPHONE ENCOUNTER
----- Message from Danisha Mobley MD sent at 6/7/2022  4:45 PM CDT -----  Please let patient know of message below    Hello,    There is no significant protein in the urine.  This is good!.   Take care!

## 2022-06-14 ENCOUNTER — HOSPITAL ENCOUNTER (OUTPATIENT)
Dept: RADIOLOGY | Facility: HOSPITAL | Age: 62
Discharge: HOME | End: 2022-06-14
Attending: FAMILY MEDICINE
Payer: MEDICARE

## 2022-06-14 ENCOUNTER — OFFICE VISIT (OUTPATIENT)
Dept: SPORTS MEDICINE | Facility: CLINIC | Age: 62
End: 2022-06-14
Payer: MEDICARE

## 2022-06-14 ENCOUNTER — HOSPITAL ENCOUNTER (OUTPATIENT)
Dept: RADIOLOGY | Facility: HOSPITAL | Age: 62
Discharge: HOME | End: 2022-06-14
Attending: STUDENT IN AN ORGANIZED HEALTH CARE EDUCATION/TRAINING PROGRAM
Payer: MEDICARE

## 2022-06-14 VITALS
SYSTOLIC BLOOD PRESSURE: 135 MMHG | HEIGHT: 64 IN | WEIGHT: 138 LBS | BODY MASS INDEX: 23.56 KG/M2 | DIASTOLIC BLOOD PRESSURE: 89 MMHG

## 2022-06-14 DIAGNOSIS — G89.29 CHRONIC LEFT HIP PAIN: Primary | ICD-10-CM

## 2022-06-14 DIAGNOSIS — M25.552 CHRONIC LEFT HIP PAIN: Primary | ICD-10-CM

## 2022-06-14 DIAGNOSIS — M25.561 BILATERAL CHRONIC KNEE PAIN: ICD-10-CM

## 2022-06-14 DIAGNOSIS — M54.16 LUMBAR RADICULOPATHY, CHRONIC: ICD-10-CM

## 2022-06-14 DIAGNOSIS — M25.562 BILATERAL CHRONIC KNEE PAIN: ICD-10-CM

## 2022-06-14 DIAGNOSIS — M25.552 CHRONIC LEFT HIP PAIN: ICD-10-CM

## 2022-06-14 DIAGNOSIS — G89.29 BILATERAL CHRONIC KNEE PAIN: ICD-10-CM

## 2022-06-14 DIAGNOSIS — G89.29 CHRONIC LEFT HIP PAIN: ICD-10-CM

## 2022-06-14 PROCEDURE — 1160F RVW MEDS BY RX/DR IN RCRD: CPT | Mod: CPTII,S$GLB,, | Performed by: STUDENT IN AN ORGANIZED HEALTH CARE EDUCATION/TRAINING PROGRAM

## 2022-06-14 PROCEDURE — 73562 X-RAY EXAM OF KNEE 3: CPT | Mod: TC,PN,LT

## 2022-06-14 PROCEDURE — 1159F PR MEDICATION LIST DOCUMENTED IN MEDICAL RECORD: ICD-10-PCS | Mod: CPTII,S$GLB,, | Performed by: STUDENT IN AN ORGANIZED HEALTH CARE EDUCATION/TRAINING PROGRAM

## 2022-06-14 PROCEDURE — 72114 X-RAY EXAM L-S SPINE BENDING: CPT | Mod: TC,PN

## 2022-06-14 PROCEDURE — 3066F NEPHROPATHY DOC TX: CPT | Mod: CPTII,S$GLB,, | Performed by: STUDENT IN AN ORGANIZED HEALTH CARE EDUCATION/TRAINING PROGRAM

## 2022-06-14 PROCEDURE — 99999 PR PBB SHADOW E&M-EST. PATIENT-LVL IV: ICD-10-PCS | Mod: PBBFAC,,, | Performed by: STUDENT IN AN ORGANIZED HEALTH CARE EDUCATION/TRAINING PROGRAM

## 2022-06-14 PROCEDURE — 1160F PR REVIEW ALL MEDS BY PRESCRIBER/CLIN PHARMACIST DOCUMENTED: ICD-10-PCS | Mod: CPTII,S$GLB,, | Performed by: STUDENT IN AN ORGANIZED HEALTH CARE EDUCATION/TRAINING PROGRAM

## 2022-06-14 PROCEDURE — 73562 XR KNEE ORTHO LEFT: ICD-10-PCS | Mod: 26,LT,, | Performed by: INTERNAL MEDICINE

## 2022-06-14 PROCEDURE — 72114 X-RAY EXAM L-S SPINE BENDING: CPT | Mod: 26,,, | Performed by: INTERNAL MEDICINE

## 2022-06-14 PROCEDURE — 99999 PR PBB SHADOW E&M-EST. PATIENT-LVL IV: CPT | Mod: PBBFAC,,, | Performed by: STUDENT IN AN ORGANIZED HEALTH CARE EDUCATION/TRAINING PROGRAM

## 2022-06-14 PROCEDURE — 73502 XR HIP WITH PELVIS WHEN PERFORMED, 2 OR 3 VIEWS LEFT: ICD-10-PCS | Mod: 26,LT,, | Performed by: RADIOLOGY

## 2022-06-14 PROCEDURE — 73502 X-RAY EXAM HIP UNI 2-3 VIEWS: CPT | Mod: TC,PN,LT

## 2022-06-14 PROCEDURE — 3079F PR MOST RECENT DIASTOLIC BLOOD PRESSURE 80-89 MM HG: ICD-10-PCS | Mod: CPTII,S$GLB,, | Performed by: STUDENT IN AN ORGANIZED HEALTH CARE EDUCATION/TRAINING PROGRAM

## 2022-06-14 PROCEDURE — 73562 X-RAY EXAM OF KNEE 3: CPT | Mod: 26,LT,, | Performed by: INTERNAL MEDICINE

## 2022-06-14 PROCEDURE — 99214 PR OFFICE/OUTPT VISIT, EST, LEVL IV, 30-39 MIN: ICD-10-PCS | Mod: S$GLB,,, | Performed by: STUDENT IN AN ORGANIZED HEALTH CARE EDUCATION/TRAINING PROGRAM

## 2022-06-14 PROCEDURE — 3008F PR BODY MASS INDEX (BMI) DOCUMENTED: ICD-10-PCS | Mod: CPTII,S$GLB,, | Performed by: STUDENT IN AN ORGANIZED HEALTH CARE EDUCATION/TRAINING PROGRAM

## 2022-06-14 PROCEDURE — 3066F PR DOCUMENTATION OF TREATMENT FOR NEPHROPATHY: ICD-10-PCS | Mod: CPTII,S$GLB,, | Performed by: STUDENT IN AN ORGANIZED HEALTH CARE EDUCATION/TRAINING PROGRAM

## 2022-06-14 PROCEDURE — 72114 XR LUMBAR SPINE 5 VIEW WITH FLEX AND EXT: ICD-10-PCS | Mod: 26,,, | Performed by: INTERNAL MEDICINE

## 2022-06-14 PROCEDURE — 3061F PR NEG MICROALBUMINURIA RESULT DOCUMENTED/REVIEW: ICD-10-PCS | Mod: CPTII,S$GLB,, | Performed by: STUDENT IN AN ORGANIZED HEALTH CARE EDUCATION/TRAINING PROGRAM

## 2022-06-14 PROCEDURE — 3051F PR MOST RECENT HEMOGLOBIN A1C LEVEL 7.0 - < 8.0%: ICD-10-PCS | Mod: CPTII,S$GLB,, | Performed by: STUDENT IN AN ORGANIZED HEALTH CARE EDUCATION/TRAINING PROGRAM

## 2022-06-14 PROCEDURE — 99214 OFFICE O/P EST MOD 30 MIN: CPT | Mod: S$GLB,,, | Performed by: STUDENT IN AN ORGANIZED HEALTH CARE EDUCATION/TRAINING PROGRAM

## 2022-06-14 PROCEDURE — 3051F HG A1C>EQUAL 7.0%<8.0%: CPT | Mod: CPTII,S$GLB,, | Performed by: STUDENT IN AN ORGANIZED HEALTH CARE EDUCATION/TRAINING PROGRAM

## 2022-06-14 PROCEDURE — 3079F DIAST BP 80-89 MM HG: CPT | Mod: CPTII,S$GLB,, | Performed by: STUDENT IN AN ORGANIZED HEALTH CARE EDUCATION/TRAINING PROGRAM

## 2022-06-14 PROCEDURE — 73560 XR KNEE ORTHO LEFT: ICD-10-PCS | Mod: 26,RT,, | Performed by: INTERNAL MEDICINE

## 2022-06-14 PROCEDURE — 73502 X-RAY EXAM HIP UNI 2-3 VIEWS: CPT | Mod: 26,LT,, | Performed by: RADIOLOGY

## 2022-06-14 PROCEDURE — 3075F SYST BP GE 130 - 139MM HG: CPT | Mod: CPTII,S$GLB,, | Performed by: STUDENT IN AN ORGANIZED HEALTH CARE EDUCATION/TRAINING PROGRAM

## 2022-06-14 PROCEDURE — 3075F PR MOST RECENT SYSTOLIC BLOOD PRESS GE 130-139MM HG: ICD-10-PCS | Mod: CPTII,S$GLB,, | Performed by: STUDENT IN AN ORGANIZED HEALTH CARE EDUCATION/TRAINING PROGRAM

## 2022-06-14 PROCEDURE — 3008F BODY MASS INDEX DOCD: CPT | Mod: CPTII,S$GLB,, | Performed by: STUDENT IN AN ORGANIZED HEALTH CARE EDUCATION/TRAINING PROGRAM

## 2022-06-14 PROCEDURE — 1159F MED LIST DOCD IN RCRD: CPT | Mod: CPTII,S$GLB,, | Performed by: STUDENT IN AN ORGANIZED HEALTH CARE EDUCATION/TRAINING PROGRAM

## 2022-06-14 PROCEDURE — 3061F NEG MICROALBUMINURIA REV: CPT | Mod: CPTII,S$GLB,, | Performed by: STUDENT IN AN ORGANIZED HEALTH CARE EDUCATION/TRAINING PROGRAM

## 2022-06-14 PROCEDURE — 73560 X-RAY EXAM OF KNEE 1 OR 2: CPT | Mod: 26,RT,, | Performed by: INTERNAL MEDICINE

## 2022-06-14 PROCEDURE — 73560 X-RAY EXAM OF KNEE 1 OR 2: CPT | Mod: TC,PN,RT

## 2022-06-16 ENCOUNTER — TELEPHONE (OUTPATIENT)
Dept: PAIN MEDICINE | Facility: CLINIC | Age: 62
End: 2022-06-16
Payer: MEDICARE

## 2022-06-16 ENCOUNTER — TELEPHONE (OUTPATIENT)
Dept: NEUROLOGY | Facility: CLINIC | Age: 62
End: 2022-06-16
Payer: MEDICARE

## 2022-06-16 ENCOUNTER — PATIENT MESSAGE (OUTPATIENT)
Dept: PAIN MEDICINE | Facility: CLINIC | Age: 62
End: 2022-06-16
Payer: MEDICARE

## 2022-06-16 NOTE — TELEPHONE ENCOUNTER
Attempted to contact patient about her appt tomorrow.       MD will not be in clinic tomorrow.   Her appt has to be rescheduled. I sent a msg via portal and asked to reach back out if the date and time do not work.     ER

## 2022-06-16 NOTE — TELEPHONE ENCOUNTER
LVm requesting that patient return my call so that we can schedule the EMG Procedure that was ordered. I provided my return contact information.

## 2022-06-30 NOTE — PROGRESS NOTES
CC: left hip pain, bilateral knee pain    61 y.o. Female presents today for follow up evaluation of her left hip pain / bilateral knee pain and to review XR results.     Attempted treatments: none since last visit   Pain score: 8/10  History of trauma/injury: none since last visit   Affecting ADLs: yes      REVIEW OF SYSTEMS:   Constitution: Patient denies fever or chills.  Eyes: Patient denies eye pain or vision changes.  HEENT: Patient denies ear pain, sore throat, or nasal discharge.  CVS: Patient denies chest pain.  Lungs: Patient denies shortness of breath or cough.  Skin: Patient denies skin rash or itching.    Musculoskeletal: Patient denies recent falls. See HPI.  Psych: Patient denies any current anxiety or nervousness.    PAST MEDICAL HISTORY:   Past Medical History:   Diagnosis Date    Allergy     Environmental and seasonal allergies     Moderate persistent asthma without complication 4/8/2022       MEDICATIONS:     Current Outpatient Medications:     albuterol (ACCUNEB) 1.25 mg/3 mL Nebu, USE 1 VIAL PER NEBULIZER EVERY 6 HOURS AS NEEDED, Disp: 225 mL, Rfl: 1    albuterol (PROVENTIL/VENTOLIN HFA) 90 mcg/actuation inhaler, Inhale 2 puffs into the lungs every 4 (four) hours as needed for Wheezing (shortness of breath). Rescue, Disp: 20.1 g, Rfl: 11    atorvastatin (LIPITOR) 10 MG tablet, Take 1 tablet (10 mg total) by mouth every evening., Disp: 90 tablet, Rfl: 3    budesonide-formoterol 160-4.5 mcg (SYMBICORT) 160-4.5 mcg/actuation HFAA, Inhale 2 puffs into the lungs 2 (two) times daily as needed (shortness of breath or chest tightness). See written instructions., Disp: 20.4 g, Rfl: 1    camphor-menthoL 0.2-3.5 % Gel, Apply 1 application topically 2 (two) times daily as needed (pain)., Disp: 113.4 g, Rfl: 3    cetirizine (ZYRTEC) 10 MG tablet, Take 1 tablet (10 mg total) by mouth once daily. 2 tablets every morning for itching, Disp: 90 tablet, Rfl: 3    cyclobenzaprine (FLEXERIL) 10 MG tablet, ,  Disp: , Rfl:     DULoxetine (CYMBALTA) 30 MG capsule, Take 1 capsule (30 mg total) by mouth once daily., Disp: 30 capsule, Rfl: 11    famotidine (PEPCID) 20 MG tablet, Take 20 mg by mouth once daily., Disp: , Rfl:     levocetirizine (XYZAL) 5 MG tablet, Take 1 tablet (5 mg total) by mouth every evening., Disp: 30 tablet, Rfl: 11    meloxicam (MOBIC) 15 MG tablet, Take 15 mg by mouth once daily., Disp: , Rfl:     methocarbamoL (ROBAXIN) 500 MG Tab, SMARTSI Tablet(s) By Mouth Every 12 Hours, Disp: , Rfl:     methylPREDNISolone (MEDROL DOSEPACK) 4 mg tablet, use as directed, Disp: 21 each, Rfl: 0    mometasone (NASONEX) 50 mcg/actuation nasal spray, 2 sprays by Nasal route 2 (two) times daily., Disp: 2 each, Rfl: 5    montelukast (SINGULAIR) 10 mg tablet, TK 1 T PO QD FOR ASTHMA OR ALLERGIES, Disp: , Rfl:     naproxen (NAPROSYN) 500 MG tablet, TK 1 T PO BID PRF PAIN, Disp: , Rfl:     traMADoL (ULTRAM) 50 mg tablet, SMARTSI Tablet(s) By Mouth Every 12 Hours PRN, Disp: , Rfl:     aspirin (ECOTRIN) 81 MG EC tablet, Take 1 tablet (81 mg total) by mouth once daily., Disp: 90 tablet, Rfl: 3    buPROPion (WELLBUTRIN SR) 150 MG TBSR 12 hr tablet, Take 1 tablet (150 mg total) by mouth once daily for 3 days, THEN 1 tablet (150 mg total) 2 (two) times daily., Disp: 177 tablet, Rfl: 0    ALLERGIES:   Review of patient's allergies indicates:   Allergen Reactions    Diphenhydramine hcl Anaphylaxis      IMAGIN. Lumbar X-ray ordered due to left hip pain and bilateral knee pain. 5 views taken 22.   2. X-ray images were reviewed personally by me and then directly with patient.  3. FINDINGS: Lumbar dextrocurvature.  Straightening of the normal lumbar lordosis.  No spondylolisthesis.  No dynamic instability on flexion or extension radiographs.  No compression fractures.  No evidence of pars defect.  Mild degenerative disc disease with small endplate osteophytes.  Intervertebral disc heights are relatively  "preserved.  Facet joint spaces are preserved.  Vascular calcifications are present.    4. IMPRESSION:  Mild degenerative changes.  No dynamic instability.    PHYSICAL EXAMINATION:  /86   Ht 5' 4" (1.626 m)   Wt 62.6 kg (138 lb 0.1 oz)   BMI 23.69 kg/m²   Vitals signs and nursing note have been reviewed.    General: In no acute distress, well developed, well nourished, no diaphoresis  Eyes: EOM full and smooth, no eye redness or discharge  HENT: normocephalic and atraumatic, neck supple, trachea midline, no nasal discharge  Cardiovascular: no LE edema  Lungs: respirations non-labored, no conversational dyspnea   Neuro: AAOx3, CN2-12 grossly intact  Skin: No rashes, warm and dry  Psychiatric: cooperative, pleasant, mood and affect appropriate for age    ASSESSMENT:      ICD-10-CM ICD-9-CM   1. Lumbar radiculopathy, chronic  M54.16 724.4   2. DDD (degenerative disc disease), lumbar  M51.36 722.52         PLAN:  Patient with degenerative disc disease in her lumbar spine along with loss of lumbar lordosis.  I am still with the working diagnosis of lumbar radiculopathy.  Patient was unable to get her MRI prior to this appointment.  Patient is scheduled for EMG later this month.  Patient has an appointment with pain management for her neck, thus we will refer her to them for her lumbar spine as well.  We will try to reschedule her MRI in time for her appointment with pain management.  Patient was offered formal physical therapy for her lumbar spine, but she refused stating that she has done physical therapy in the past images makes her worse.    Future planning includes - reschedule MRI, referral to Pain Management    All questions were answered to the best of my ability and all concerns were addressed at this time.    Follow up p.r.n.      This note is dictated using the M*Modal Fluency Direct word recognition program. There are word recognition mistakes that are occasionally missed on review.      "

## 2022-07-05 ENCOUNTER — OFFICE VISIT (OUTPATIENT)
Dept: SPORTS MEDICINE | Facility: CLINIC | Age: 62
End: 2022-07-05
Payer: MEDICARE

## 2022-07-05 VITALS
WEIGHT: 138 LBS | HEIGHT: 64 IN | BODY MASS INDEX: 23.56 KG/M2 | SYSTOLIC BLOOD PRESSURE: 131 MMHG | DIASTOLIC BLOOD PRESSURE: 86 MMHG

## 2022-07-05 DIAGNOSIS — M54.16 LUMBAR RADICULOPATHY, CHRONIC: Primary | ICD-10-CM

## 2022-07-05 DIAGNOSIS — M51.36 DDD (DEGENERATIVE DISC DISEASE), LUMBAR: ICD-10-CM

## 2022-07-05 PROCEDURE — 99999 PR PBB SHADOW E&M-EST. PATIENT-LVL IV: ICD-10-PCS | Mod: PBBFAC,,, | Performed by: STUDENT IN AN ORGANIZED HEALTH CARE EDUCATION/TRAINING PROGRAM

## 2022-07-05 PROCEDURE — 3066F NEPHROPATHY DOC TX: CPT | Mod: CPTII,S$GLB,, | Performed by: STUDENT IN AN ORGANIZED HEALTH CARE EDUCATION/TRAINING PROGRAM

## 2022-07-05 PROCEDURE — 3008F PR BODY MASS INDEX (BMI) DOCUMENTED: ICD-10-PCS | Mod: CPTII,S$GLB,, | Performed by: STUDENT IN AN ORGANIZED HEALTH CARE EDUCATION/TRAINING PROGRAM

## 2022-07-05 PROCEDURE — 1160F RVW MEDS BY RX/DR IN RCRD: CPT | Mod: CPTII,S$GLB,, | Performed by: STUDENT IN AN ORGANIZED HEALTH CARE EDUCATION/TRAINING PROGRAM

## 2022-07-05 PROCEDURE — 3079F DIAST BP 80-89 MM HG: CPT | Mod: CPTII,S$GLB,, | Performed by: STUDENT IN AN ORGANIZED HEALTH CARE EDUCATION/TRAINING PROGRAM

## 2022-07-05 PROCEDURE — 3051F PR MOST RECENT HEMOGLOBIN A1C LEVEL 7.0 - < 8.0%: ICD-10-PCS | Mod: CPTII,S$GLB,, | Performed by: STUDENT IN AN ORGANIZED HEALTH CARE EDUCATION/TRAINING PROGRAM

## 2022-07-05 PROCEDURE — 3079F PR MOST RECENT DIASTOLIC BLOOD PRESSURE 80-89 MM HG: ICD-10-PCS | Mod: CPTII,S$GLB,, | Performed by: STUDENT IN AN ORGANIZED HEALTH CARE EDUCATION/TRAINING PROGRAM

## 2022-07-05 PROCEDURE — 3061F NEG MICROALBUMINURIA REV: CPT | Mod: CPTII,S$GLB,, | Performed by: STUDENT IN AN ORGANIZED HEALTH CARE EDUCATION/TRAINING PROGRAM

## 2022-07-05 PROCEDURE — 3066F PR DOCUMENTATION OF TREATMENT FOR NEPHROPATHY: ICD-10-PCS | Mod: CPTII,S$GLB,, | Performed by: STUDENT IN AN ORGANIZED HEALTH CARE EDUCATION/TRAINING PROGRAM

## 2022-07-05 PROCEDURE — 1125F PR PAIN SEVERITY QUANTIFIED, PAIN PRESENT: ICD-10-PCS | Mod: CPTII,S$GLB,, | Performed by: STUDENT IN AN ORGANIZED HEALTH CARE EDUCATION/TRAINING PROGRAM

## 2022-07-05 PROCEDURE — 1159F MED LIST DOCD IN RCRD: CPT | Mod: CPTII,S$GLB,, | Performed by: STUDENT IN AN ORGANIZED HEALTH CARE EDUCATION/TRAINING PROGRAM

## 2022-07-05 PROCEDURE — 3008F BODY MASS INDEX DOCD: CPT | Mod: CPTII,S$GLB,, | Performed by: STUDENT IN AN ORGANIZED HEALTH CARE EDUCATION/TRAINING PROGRAM

## 2022-07-05 PROCEDURE — 99214 PR OFFICE/OUTPT VISIT, EST, LEVL IV, 30-39 MIN: ICD-10-PCS | Mod: S$GLB,,, | Performed by: STUDENT IN AN ORGANIZED HEALTH CARE EDUCATION/TRAINING PROGRAM

## 2022-07-05 PROCEDURE — 3051F HG A1C>EQUAL 7.0%<8.0%: CPT | Mod: CPTII,S$GLB,, | Performed by: STUDENT IN AN ORGANIZED HEALTH CARE EDUCATION/TRAINING PROGRAM

## 2022-07-05 PROCEDURE — 3075F PR MOST RECENT SYSTOLIC BLOOD PRESS GE 130-139MM HG: ICD-10-PCS | Mod: CPTII,S$GLB,, | Performed by: STUDENT IN AN ORGANIZED HEALTH CARE EDUCATION/TRAINING PROGRAM

## 2022-07-05 PROCEDURE — 1125F AMNT PAIN NOTED PAIN PRSNT: CPT | Mod: CPTII,S$GLB,, | Performed by: STUDENT IN AN ORGANIZED HEALTH CARE EDUCATION/TRAINING PROGRAM

## 2022-07-05 PROCEDURE — 99999 PR PBB SHADOW E&M-EST. PATIENT-LVL IV: CPT | Mod: PBBFAC,,, | Performed by: STUDENT IN AN ORGANIZED HEALTH CARE EDUCATION/TRAINING PROGRAM

## 2022-07-05 PROCEDURE — 99214 OFFICE O/P EST MOD 30 MIN: CPT | Mod: S$GLB,,, | Performed by: STUDENT IN AN ORGANIZED HEALTH CARE EDUCATION/TRAINING PROGRAM

## 2022-07-05 PROCEDURE — 3075F SYST BP GE 130 - 139MM HG: CPT | Mod: CPTII,S$GLB,, | Performed by: STUDENT IN AN ORGANIZED HEALTH CARE EDUCATION/TRAINING PROGRAM

## 2022-07-05 PROCEDURE — 1159F PR MEDICATION LIST DOCUMENTED IN MEDICAL RECORD: ICD-10-PCS | Mod: CPTII,S$GLB,, | Performed by: STUDENT IN AN ORGANIZED HEALTH CARE EDUCATION/TRAINING PROGRAM

## 2022-07-05 PROCEDURE — 1160F PR REVIEW ALL MEDS BY PRESCRIBER/CLIN PHARMACIST DOCUMENTED: ICD-10-PCS | Mod: CPTII,S$GLB,, | Performed by: STUDENT IN AN ORGANIZED HEALTH CARE EDUCATION/TRAINING PROGRAM

## 2022-07-05 PROCEDURE — 3061F PR NEG MICROALBUMINURIA RESULT DOCUMENTED/REVIEW: ICD-10-PCS | Mod: CPTII,S$GLB,, | Performed by: STUDENT IN AN ORGANIZED HEALTH CARE EDUCATION/TRAINING PROGRAM

## 2022-07-08 ENCOUNTER — OFFICE VISIT (OUTPATIENT)
Dept: PAIN MEDICINE | Facility: CLINIC | Age: 62
End: 2022-07-08
Payer: MEDICARE

## 2022-07-08 VITALS — WEIGHT: 138 LBS | BODY MASS INDEX: 23.69 KG/M2

## 2022-07-08 DIAGNOSIS — M70.62 GREATER TROCHANTERIC BURSITIS, LEFT: Primary | ICD-10-CM

## 2022-07-08 DIAGNOSIS — M25.561 BILATERAL CHRONIC KNEE PAIN: ICD-10-CM

## 2022-07-08 DIAGNOSIS — M25.562 BILATERAL CHRONIC KNEE PAIN: ICD-10-CM

## 2022-07-08 DIAGNOSIS — G89.4 CHRONIC PAIN SYNDROME: ICD-10-CM

## 2022-07-08 DIAGNOSIS — G89.29 BILATERAL CHRONIC KNEE PAIN: ICD-10-CM

## 2022-07-08 DIAGNOSIS — M54.12 CERVICAL RADICULOPATHY: ICD-10-CM

## 2022-07-08 PROCEDURE — 3008F BODY MASS INDEX DOCD: CPT | Mod: CPTII,S$GLB,, | Performed by: PAIN MEDICINE

## 2022-07-08 PROCEDURE — 99999 PR PBB SHADOW E&M-EST. PATIENT-LVL III: ICD-10-PCS | Mod: PBBFAC,,, | Performed by: PAIN MEDICINE

## 2022-07-08 PROCEDURE — 3008F PR BODY MASS INDEX (BMI) DOCUMENTED: ICD-10-PCS | Mod: CPTII,S$GLB,, | Performed by: PAIN MEDICINE

## 2022-07-08 PROCEDURE — 3051F HG A1C>EQUAL 7.0%<8.0%: CPT | Mod: CPTII,S$GLB,, | Performed by: PAIN MEDICINE

## 2022-07-08 PROCEDURE — 3066F PR DOCUMENTATION OF TREATMENT FOR NEPHROPATHY: ICD-10-PCS | Mod: CPTII,S$GLB,, | Performed by: PAIN MEDICINE

## 2022-07-08 PROCEDURE — 3061F PR NEG MICROALBUMINURIA RESULT DOCUMENTED/REVIEW: ICD-10-PCS | Mod: CPTII,S$GLB,, | Performed by: PAIN MEDICINE

## 2022-07-08 PROCEDURE — 3061F NEG MICROALBUMINURIA REV: CPT | Mod: CPTII,S$GLB,, | Performed by: PAIN MEDICINE

## 2022-07-08 PROCEDURE — 1159F PR MEDICATION LIST DOCUMENTED IN MEDICAL RECORD: ICD-10-PCS | Mod: CPTII,S$GLB,, | Performed by: PAIN MEDICINE

## 2022-07-08 PROCEDURE — 1159F MED LIST DOCD IN RCRD: CPT | Mod: CPTII,S$GLB,, | Performed by: PAIN MEDICINE

## 2022-07-08 PROCEDURE — 3066F NEPHROPATHY DOC TX: CPT | Mod: CPTII,S$GLB,, | Performed by: PAIN MEDICINE

## 2022-07-08 PROCEDURE — 99999 PR PBB SHADOW E&M-EST. PATIENT-LVL III: CPT | Mod: PBBFAC,,, | Performed by: PAIN MEDICINE

## 2022-07-08 PROCEDURE — 99204 OFFICE O/P NEW MOD 45 MIN: CPT | Mod: S$GLB,,, | Performed by: PAIN MEDICINE

## 2022-07-08 PROCEDURE — 3051F PR MOST RECENT HEMOGLOBIN A1C LEVEL 7.0 - < 8.0%: ICD-10-PCS | Mod: CPTII,S$GLB,, | Performed by: PAIN MEDICINE

## 2022-07-08 PROCEDURE — 99204 PR OFFICE/OUTPT VISIT, NEW, LEVL IV, 45-59 MIN: ICD-10-PCS | Mod: S$GLB,,, | Performed by: PAIN MEDICINE

## 2022-07-08 NOTE — PROGRESS NOTES
[facility]    Referred by:  Dr. Danisha Kelly*     Reason for referral:  Chronic pain syndrome  Cervical radiculopathy     CC:   Chief Complaint   Patient presents with    Knee Pain     Bilateral      Subjective:   Aileen Alegria is a 61 y.o. female who has a past medical history of Allergy, Environmental and seasonal allergies, and Moderate persistent asthma without complication. She complains of pain as described below.  She is most concerned about bilateral knee pain, but also reports left hip, low back, and right shoulder pains too.  She is a current smoker.    Location: low back   Onset: years ago   Radiation: left hip, bilateral knee and right shoulder   Timing: constant  Current Pain Score: 8/10  Weekly Pain Range: 6-8/10  Quality: Aching, Sharp and Shooting  Worsened by: nothing in particular  Improved by: nothing    Previous Therapies:  PT/OT: No  HEP: None  Interventions: in the past ([facility])  Surgery: back surgery 7/2020  Opioids:  Adjuvants: Naproxen (no effect), Tramadol (no effect)    Current Pain Medications:  1. Cymbalta 30 mg daily  2. Mobic 15 mg daily  3. Flexeril 10 mg daily    Assessment & Plan:  Problem List Items Addressed This Visit    None     Visit Diagnoses     Greater trochanteric bursitis, left    -  Primary    Relevant Orders    Ambulatory referral/consult to Physical/Occupational Therapy    Chronic pain syndrome        Cervical radiculopathy        Bilateral chronic knee pain        Relevant Orders    Ambulatory referral/consult to Physical/Occupational Therapy          This is a 61-year-old female active smoker with a history of type 2 diabetes who presents with numerous pain complaints; however, her to primary complaints are bilateral knee pain and left hip pain.  Physical examination reveals left hip pain to be related to left GT bursitis.  X-ray bilateral knee shows moderate joint space loss, but there is no secondary evidence of arthritis such as  spurring around the joint.  There are no subchondral changes either.  Given these findings, I believe the best course of action would be to enroll her in physical therapy for reconditioning the knee and hips as well as for the development of a home exercise regimen.  I recommend smoking cessation.  I offered her a left GT bursa injection, but she declined stating a preference not to drive to my procedure suite in [place].    1. External referral to physical therapy for bilateral knee and hip  2. Recommend left GT bursa injection, patient declined.  3. Recommend smoking cessation  4. Follow-up upon completion of physical therapy in 8 weeks.        Disclaimer: This note was partly generated using dictation software which may occasionally result in transcription errors.    Imaging:  Reviewed x-ray bilateral knee showing minimal degenerative change.  Reviewed x-ray bilateral hip also showing minimal degenerative change.    Review of Systems:  Review of Systems   Constitutional: Negative for chills and fever.   HENT: Negative for nosebleeds.    Eyes: Negative for blurred vision and pain.   Respiratory: Negative for hemoptysis.    Cardiovascular: Negative for chest pain and palpitations.   Gastrointestinal: Negative for heartburn, nausea and vomiting.   Genitourinary: Negative for dysuria and hematuria.   Musculoskeletal: Positive for joint pain, myalgias and neck pain. Negative for falls.   Skin: Negative for rash.   Neurological: Negative for seizures and loss of consciousness.   Endo/Heme/Allergies: Does not bruise/bleed easily.   Psychiatric/Behavioral: Negative for hallucinations.       Physical Exam:  Vitals:    07/08/22 1409   Weight: 62.6 kg (138 lb 0.1 oz)   PainSc:   8     General    Nursing note and vitals reviewed.  Constitutional: She is oriented to person, place, and time. She appears well-developed and well-nourished. No distress.   HENT:   Head: Normocephalic and atraumatic.   Nose: Nose normal.   Eyes:  Conjunctivae and EOM are normal. Pupils are equal, round, and reactive to light. Right eye exhibits no discharge. Left eye exhibits no discharge. No scleral icterus.   Neck: No JVD present.   Cardiovascular: Intact distal pulses.    Pulmonary/Chest: Effort normal. No respiratory distress.   Abdominal: She exhibits no distension.   Neurological: She is alert and oriented to person, place, and time. Coordination normal.   Psychiatric: She has a normal mood and affect. Her behavior is normal. Judgment and thought content normal.     General Musculoskeletal Exam   Gait: antalgic       Right Knee Exam     Inspection   Swelling: present  Deformity: absent    Tenderness   The patient is tender to palpation of the medial joint line and lateral joint line.    Range of Motion   Extension: normal Right knee extension grade: painful.   Flexion: normal Right knee flexion: painful.     Other   Sensation: normal    Left Knee Exam     Inspection   Swelling: present  Deformity: absent    Tenderness   The patient tender to palpation of the medial joint line and lateral joint line.    Range of Motion   Extension: normal Left knee extension grade: painful.   Flexion: normal Left knee flexion: painful.     Other   Sensation: normalLeft Hip Exam     Tenderness   The patient tender to palpation of the trochanteric bursa.          Muscle Strength   Right Lower Extremity   Quadriceps:  5/5   Hamstrin/5   Left Lower Extremity   Quadriceps:  5/5   Hamstrin/5     Reflexes     Left Side  Quadriceps:  2+    Right Side   Quadriceps:  2+

## 2022-07-11 ENCOUNTER — HOSPITAL ENCOUNTER (OUTPATIENT)
Dept: RADIOLOGY | Facility: HOSPITAL | Age: 62
Discharge: HOME | End: 2022-07-11
Attending: STUDENT IN AN ORGANIZED HEALTH CARE EDUCATION/TRAINING PROGRAM
Payer: MEDICARE

## 2022-07-11 DIAGNOSIS — M54.16 LUMBAR RADICULOPATHY, CHRONIC: ICD-10-CM

## 2022-07-11 PROCEDURE — 72148 MRI LUMBAR SPINE W/O DYE: CPT | Mod: TC

## 2022-07-11 PROCEDURE — 72148 MRI LUMBAR SPINE W/O DYE: CPT | Mod: 26,,, | Performed by: RADIOLOGY

## 2022-07-11 PROCEDURE — 72148 MRI LUMBAR SPINE WITHOUT CONTRAST: ICD-10-PCS | Mod: 26,,, | Performed by: RADIOLOGY

## 2022-08-01 ENCOUNTER — PATIENT OUTREACH (OUTPATIENT)
Dept: ADMINISTRATIVE | Facility: HOSPITAL | Age: 62
End: 2022-08-01
Payer: MEDICARE

## 2022-09-19 ENCOUNTER — PATIENT OUTREACH (OUTPATIENT)
Dept: ADMINISTRATIVE | Facility: HOSPITAL | Age: 62
End: 2022-09-19
Payer: MEDICARE

## 2022-09-23 ENCOUNTER — TELEPHONE (OUTPATIENT)
Dept: PHYSICAL MEDICINE AND REHAB | Facility: CLINIC | Age: 62
End: 2022-09-23
Payer: MEDICARE

## 2022-11-30 ENCOUNTER — PATIENT OUTREACH (OUTPATIENT)
Dept: ADMINISTRATIVE | Facility: HOSPITAL | Age: 62
End: 2022-11-30
Payer: MEDICARE

## 2023-01-25 ENCOUNTER — PATIENT MESSAGE (OUTPATIENT)
Dept: ADMINISTRATIVE | Facility: HOSPITAL | Age: 63
End: 2023-01-25
Payer: MEDICARE

## 2023-02-06 ENCOUNTER — TELEPHONE (OUTPATIENT)
Dept: ALLERGY | Facility: CLINIC | Age: 63
End: 2023-02-06
Payer: MEDICARE

## 2023-02-06 NOTE — TELEPHONE ENCOUNTER
Advised patient to make a Follow Up appointment at her earliest convenience.    Regards  Grayson

## 2023-02-08 RX ORDER — ONDANSETRON 4 MG/1
TABLET, ORALLY DISINTEGRATING ORAL
COMMUNITY
Start: 2023-02-07

## 2023-02-08 RX ORDER — HYDROCODONE BITARTRATE AND ACETAMINOPHEN 5; 325 MG/1; MG/1
1 TABLET ORAL EVERY 6 HOURS PRN
COMMUNITY
Start: 2022-08-10

## 2023-02-23 ENCOUNTER — TELEPHONE (OUTPATIENT)
Dept: ALLERGY | Facility: CLINIC | Age: 63
End: 2023-02-23
Payer: MEDICARE

## 2023-02-23 NOTE — TELEPHONE ENCOUNTER
Telephone call made to arrange an Annual Follow Up Appointment, patient wanted [facility], explained Clinic no longer at [place] and told her we are in [place], offered patient a date and time (Feb. 28th @ 2:40pm), patient stated she had to go and hung up on me.

## 2023-03-20 ENCOUNTER — DOCUMENTATION ONLY (OUTPATIENT)
Dept: ALLERGY | Facility: CLINIC | Age: 63
End: 2023-03-20

## 2023-04-19 ENCOUNTER — OFFICE VISIT (OUTPATIENT)
Dept: ALLERGY | Facility: CLINIC | Age: 63
End: 2023-04-19
Payer: MEDICARE

## 2023-04-19 VITALS
SYSTOLIC BLOOD PRESSURE: 128 MMHG | BODY MASS INDEX: 23.65 KG/M2 | WEIGHT: 137.81 LBS | DIASTOLIC BLOOD PRESSURE: 83 MMHG | HEART RATE: 73 BPM | OXYGEN SATURATION: 100 %

## 2023-04-19 DIAGNOSIS — F17.210 CIGARETTE NICOTINE DEPENDENCE WITHOUT COMPLICATION: ICD-10-CM

## 2023-04-19 DIAGNOSIS — L29.9 PRURITUS: ICD-10-CM

## 2023-04-19 DIAGNOSIS — J30.9 CHRONIC ALLERGIC RHINITIS: ICD-10-CM

## 2023-04-19 DIAGNOSIS — J45.909 ASTHMA, UNSPECIFIED ASTHMA SEVERITY, UNSPECIFIED WHETHER COMPLICATED, UNSPECIFIED WHETHER PERSISTENT: Primary | ICD-10-CM

## 2023-04-19 PROCEDURE — 3079F PR MOST RECENT DIASTOLIC BLOOD PRESSURE 80-89 MM HG: ICD-10-PCS | Mod: CPTII,S$GLB,, | Performed by: STUDENT IN AN ORGANIZED HEALTH CARE EDUCATION/TRAINING PROGRAM

## 2023-04-19 PROCEDURE — 99999 PR PBB SHADOW E&M-EST. PATIENT-LVL III: ICD-10-PCS | Mod: PBBFAC,,, | Performed by: STUDENT IN AN ORGANIZED HEALTH CARE EDUCATION/TRAINING PROGRAM

## 2023-04-19 PROCEDURE — 99499 RISK ADDL DX/OHS AUDIT: ICD-10-PCS | Mod: S$GLB,,, | Performed by: STUDENT IN AN ORGANIZED HEALTH CARE EDUCATION/TRAINING PROGRAM

## 2023-04-19 PROCEDURE — 1160F PR REVIEW ALL MEDS BY PRESCRIBER/CLIN PHARMACIST DOCUMENTED: ICD-10-PCS | Mod: CPTII,S$GLB,, | Performed by: STUDENT IN AN ORGANIZED HEALTH CARE EDUCATION/TRAINING PROGRAM

## 2023-04-19 PROCEDURE — 99215 PR OFFICE/OUTPT VISIT, EST, LEVL V, 40-54 MIN: ICD-10-PCS | Mod: S$GLB,,, | Performed by: STUDENT IN AN ORGANIZED HEALTH CARE EDUCATION/TRAINING PROGRAM

## 2023-04-19 PROCEDURE — 3008F PR BODY MASS INDEX (BMI) DOCUMENTED: ICD-10-PCS | Mod: CPTII,S$GLB,, | Performed by: STUDENT IN AN ORGANIZED HEALTH CARE EDUCATION/TRAINING PROGRAM

## 2023-04-19 PROCEDURE — 99499 UNLISTED E&M SERVICE: CPT | Mod: S$GLB,,, | Performed by: STUDENT IN AN ORGANIZED HEALTH CARE EDUCATION/TRAINING PROGRAM

## 2023-04-19 PROCEDURE — 3074F SYST BP LT 130 MM HG: CPT | Mod: CPTII,S$GLB,, | Performed by: STUDENT IN AN ORGANIZED HEALTH CARE EDUCATION/TRAINING PROGRAM

## 2023-04-19 PROCEDURE — 3008F BODY MASS INDEX DOCD: CPT | Mod: CPTII,S$GLB,, | Performed by: STUDENT IN AN ORGANIZED HEALTH CARE EDUCATION/TRAINING PROGRAM

## 2023-04-19 PROCEDURE — 99999 PR PBB SHADOW E&M-EST. PATIENT-LVL III: CPT | Mod: PBBFAC,,, | Performed by: STUDENT IN AN ORGANIZED HEALTH CARE EDUCATION/TRAINING PROGRAM

## 2023-04-19 PROCEDURE — 3074F PR MOST RECENT SYSTOLIC BLOOD PRESSURE < 130 MM HG: ICD-10-PCS | Mod: CPTII,S$GLB,, | Performed by: STUDENT IN AN ORGANIZED HEALTH CARE EDUCATION/TRAINING PROGRAM

## 2023-04-19 PROCEDURE — 3079F DIAST BP 80-89 MM HG: CPT | Mod: CPTII,S$GLB,, | Performed by: STUDENT IN AN ORGANIZED HEALTH CARE EDUCATION/TRAINING PROGRAM

## 2023-04-19 PROCEDURE — 1160F RVW MEDS BY RX/DR IN RCRD: CPT | Mod: CPTII,S$GLB,, | Performed by: STUDENT IN AN ORGANIZED HEALTH CARE EDUCATION/TRAINING PROGRAM

## 2023-04-19 PROCEDURE — 1159F MED LIST DOCD IN RCRD: CPT | Mod: CPTII,S$GLB,, | Performed by: STUDENT IN AN ORGANIZED HEALTH CARE EDUCATION/TRAINING PROGRAM

## 2023-04-19 PROCEDURE — 1159F PR MEDICATION LIST DOCUMENTED IN MEDICAL RECORD: ICD-10-PCS | Mod: CPTII,S$GLB,, | Performed by: STUDENT IN AN ORGANIZED HEALTH CARE EDUCATION/TRAINING PROGRAM

## 2023-04-19 PROCEDURE — 99215 OFFICE O/P EST HI 40 MIN: CPT | Mod: S$GLB,,, | Performed by: STUDENT IN AN ORGANIZED HEALTH CARE EDUCATION/TRAINING PROGRAM

## 2023-04-19 RX ORDER — ALBUTEROL SULFATE 90 UG/1
2 AEROSOL, METERED RESPIRATORY (INHALATION) EVERY 6 HOURS PRN
Qty: 18 G | Refills: 2 | Status: SHIPPED | OUTPATIENT
Start: 2023-04-19

## 2023-04-19 RX ORDER — BUDESONIDE AND FORMOTEROL FUMARATE DIHYDRATE 160; 4.5 UG/1; UG/1
2 AEROSOL RESPIRATORY (INHALATION) EVERY 12 HOURS
Qty: 20.4 G | Refills: 6 | Status: SHIPPED | OUTPATIENT
Start: 2023-04-19

## 2023-04-19 RX ORDER — CETIRIZINE HYDROCHLORIDE 10 MG/1
10 TABLET ORAL DAILY
Qty: 90 TABLET | Refills: 3 | Status: SHIPPED | OUTPATIENT
Start: 2023-04-19

## 2023-04-19 RX ORDER — FLUTICASONE FUROATE 27.5 UG/1
2 SPRAY, METERED NASAL DAILY
Qty: 9.1 ML | Refills: 11 | Status: SHIPPED | OUTPATIENT
Start: 2023-04-19

## 2023-04-19 RX ORDER — LEVOCETIRIZINE DIHYDROCHLORIDE 5 MG/1
5 TABLET, FILM COATED ORAL NIGHTLY
Qty: 90 TABLET | Refills: 3 | Status: SHIPPED | OUTPATIENT
Start: 2023-04-19 | End: 2024-04-18

## 2023-04-19 RX ORDER — ALBUTEROL SULFATE 1.25 MG/3ML
SOLUTION RESPIRATORY (INHALATION)
Qty: 225 ML | Refills: 1 | Status: SHIPPED | OUTPATIENT
Start: 2023-04-19

## 2023-04-19 RX ORDER — MONTELUKAST SODIUM 10 MG/1
10 TABLET ORAL NIGHTLY
Qty: 30 TABLET | Refills: 11 | Status: SHIPPED | OUTPATIENT
Start: 2023-04-19

## 2023-04-19 NOTE — PROGRESS NOTES
ALLERGY & IMMUNOLOGY CLINIC - ESTABLISHED PATIENT     HISTORY OF PRESENT ILLNESS     Patient ID: Aileen Alegria is a 62 y.o. female    CC: asthma, allergic rhinitis    HPI: Aileen Alegria is a 62 y.o. female following up for asthma and allergic rhinitis.  She was last seen in [facility] by Dr. Hull 1/28/22 (she was scheduled to my clinic instead of Dr. Hull's by mistake).    She is here for a refill on her asthma and allergy medications.    She says she had asthma that resolved after she had children, but it started again 3-4 years ago. She is almost out of her symbicort. She says she is almost out of everything. She says she needs more for her albuterol nebulizer. She says she needs another albuterol inhaler. She says she is needing rescues about 3 times per day, which is worse than usual. She attributes this to pollen. 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 She also has a dog.   She doesn't think she has had lung function testing.  She hasn't been on montelukast in a while.    She has been very congested, but feels a little better today.    She takes xyzal nightly. She thinks she takes cetirizine in the morning. These also help with pruritus.   She uses nasonex (or nasacort?) prn. She says it makes her nose burn, so she only uses it about twice per week on average.    She is working on quitting smoking. Weaning down. She is down to 1/2 ppd. She plans to be a non-smoker by her birthday.      MEDICAL HISTORY     Vaccines:   Immunization History   Administered Date(s) Administered    COVID-19, MRNA, LN-S, PF (Pfizer) (Purple Cap) 03/02/2021, 03/24/2021, 12/28/2021    Pneumococcal Polysaccharide - 23 Valent 06/04/2021    Tdap 02/03/2014, 02/28/2019    Zoster Recombinant 03/26/2019, 06/04/2021     Medical Hx:   Patient Active Problem List   Diagnosis    Decreased range of motion of left shoulder    Decreased strength    Cigarette nicotine dependence without complication    Rotator cuff  tear arthropathy of left shoulder    Environmental and seasonal allergies    H/O cervical spine surgery    Type 2 diabetes mellitus without complication, without long-term current use of insulin    Hyperlipidemia LDL goal <100    Allergic rhinitis due to house dust mite    Bronchitis    Chronic right shoulder pain    10 year risk of MI or stroke 7.5% or greater    Encounter for smoking cessation counseling    Cervical myelopathy    Moderate persistent asthma without complication     Surgical Hx:   Past Surgical History:   Procedure Laterality Date     SECTION      NECK SURGERY  2020    C 2-C7     Medications:   Current Outpatient Medications on File Prior to Visit   Medication Sig Dispense Refill    albuterol (ACCUNEB) 1.25 mg/3 mL Nebu USE 1 VIAL PER NEBULIZER EVERY 6 HOURS AS NEEDED 225 mL 1    atorvastatin (LIPITOR) 10 MG tablet Take 1 tablet (10 mg total) by mouth every evening. 90 tablet 3    cetirizine (ZYRTEC) 10 MG tablet Take 1 tablet (10 mg total) by mouth once daily. 2 tablets every morning for itching 90 tablet 3    cyclobenzaprine (FLEXERIL) 10 MG tablet       DULoxetine (CYMBALTA) 30 MG capsule Take 1 capsule (30 mg total) by mouth once daily. 30 capsule 11    famotidine (PEPCID) 20 MG tablet Take 20 mg by mouth once daily.      HYDROcodone-acetaminophen (NORCO) 5-325 mg per tablet Take 1 tablet by mouth every 6 (six) hours as needed.      meloxicam (MOBIC) 15 MG tablet Take 15 mg by mouth once daily.      methocarbamoL (ROBAXIN) 500 MG Tab SMARTSI Tablet(s) By Mouth Every 12 Hours      methylPREDNISolone (MEDROL DOSEPACK) 4 mg tablet use as directed 21 each 0    mometasone (NASONEX) 50 mcg/actuation nasal spray 2 sprays by Nasal route 2 (two) times daily. 2 each 5    montelukast (SINGULAIR) 10 mg tablet TK 1 T PO QD FOR ASTHMA OR ALLERGIES      naproxen (NAPROSYN) 500 MG tablet TK 1 T PO BID PRF PAIN      ondansetron (ZOFRAN-ODT) 4 MG TbDL       SYMBICORT 160-4.5 mcg/actuation HFAA  INHALE 2 PUFFS INTO THE LUNGS TWICE DAILY AS NEEDED FOR SHORTNESS OF BREATH OR CHEST TIGHTNESS 20.4 g 1    traMADoL (ULTRAM) 50 mg tablet SMARTSI Tablet(s) By Mouth Every 12 Hours PRN      aspirin (ECOTRIN) 81 MG EC tablet Take 1 tablet (81 mg total) by mouth once daily. 90 tablet 3    buPROPion (WELLBUTRIN SR) 150 MG TBSR 12 hr tablet Take 1 tablet (150 mg total) by mouth once daily for 3 days, THEN 1 tablet (150 mg total) 2 (two) times daily. 177 tablet 0    camphor-menthoL 0.2-3.5 % Gel Apply 1 application topically 2 (two) times daily as needed (pain). 113.4 g 3    levocetirizine (XYZAL) 5 MG tablet Take 1 tablet (5 mg total) by mouth every evening. 30 tablet 11     No current facility-administered medications on file prior to visit.     Drug Allergies:   Review of patient's allergies indicates:   Allergen Reactions    Diphenhydramine hcl Anaphylaxis     Social Hx:   Social History     Tobacco Use    Smoking status: Every Day     Packs/day: 1.00     Types: Cigarettes    Smokeless tobacco: Never   Substance Use Topics    Alcohol use: Yes     Comment: socially     Additional History Obtained at Previous Visit by Dr. Hull:  Past medical history is significant for diabetes and smoking.  No Hx of ENT surgery but did have neck surgery in . Family history is significant asthma and allergies in her children.  Client  reports that she has been smoking cigarettes. She has never used smokeless tobacco.  Exposures are notable for a pet dog and frequent house cleaning.  She is also exposed to both passive and active smoke.       PHYSICAL EXAM     VS: /83 (BP Location: Left arm, Patient Position: Sitting, BP Method: Large (Automatic))   Pulse 73   Wt 62.5 kg (137 lb 12.6 oz)   SpO2 100%   BMI 23.65 kg/m²   GENERAL: Alert, NAD, well-appearing  EYES: EOMI, no conjunctival injection, no discharge, no infraorbital shiners  NOSE: NT 2-3 + B/L, no stringing mucus, no polyps visualized  ORAL: MMM, no ulcers, no  thrush  LUNGS: CTAB, no w/r/c, no increased WOB  HEART: RRR, normal S1/S2, no m/g/r  EXTREMITIES: No LE edema  DERM: no rashes, no skin breaks  NEURO: normal speech, normal gait, no facial asymmetry     LABORATORY STUDIES     2/7/23:     CBC/diff - within acceptable ranges, total eos 270     CMP - within acceptable ranges    Component      Latest Ref Rng & Units 6/2/2022 5/28/2021   Hemoglobin A1C External      4.0 - 5.6 % 7.1 (H)    TSH      0.400 - 4.000 uIU/mL  1.379   HIV 1/2 Ag/Ab      Negative  Negative      ALLERGEN TESTING     Immunocaps:   Component      Latest Ref Rng & Units 10/7/2020 10/7/2020 10/7/2020          11:30 AM 11:30 AM 11:30 AM   D. farinae      <0.10 kU/L   6.44 (H)   D. farinae Class         CLASS 3   Mite Dust Pteronyssinus IgE      <0.10 kU/L   15.90 (H)   D. pteronyssinus Class         CLASS 3   BERMUDA GRASS      <0.10 kU/L   <0.10   [place] Grass Class         CLASS 0   Gunner Grass      <0.10 kU/L   <0.10   Gunner Grass Class         CLASS 0   [place] IgE      <0.10 kU/L   <0.10   [place] Class         CLASS 0   Plantain      <0.10 kU/L   <0.10   English Plantain Class         CLASS 0   White Oak(Quercus alba) IgE      <0.10 kU/L   <0.10   [place], Class         CLASS 0   Ragweed, Western IgE      <0.10 kU/L   0.11 (H)   Ragweed, Western, Class         CLASS 0/1   Alternaria alternata      <0.10 kU/L   <0.10   Altern. alternata Class         CLASS 0   Aspergillus Fumigatus IgE      <0.10 kU/L   <0.10   A. fumigatus Class         CLASS 0   Cat Dander      <0.10 kU/L   <0.10   Cat Epithelium Class         CLASS 0   Cockroach, IgE      <0.10 kU/L CLASS 0 <0.10    Dog Dander, IgE      <0.10 kU/L   8.38 (H)   Dog Dander Class         CLASS 3   IgE      0 - 100 IU/mL   74      CHART REVIEW     Reviewed prior allergy note, labs.     ASSESSMENT & PLAN     Aileen Alegria is a 62 y.o. female with     # Asthma: Currently symptoms are not under good control and she needs refills on all of her  medications. She reports she is needing rescue about 3 times per day (unclear if this is in addition to or including symbicort 2 puffs BID). She reports she has not been on her montelukast in a long time.  -PFT's ordered.  -continue symbicort 2 puffs BID (refilled).  -continue albuterol inhaler or nebs prn (refilled).  -restart montelukast 10 mg nightly.    # Allergic rhinitis: Immunocaps positive to dust mites and dog. She reports increased congestion recently. She says she only uses her nasonex (or nasacort?) a couple of times per week because it causes nasal burning.   -continue cetirizine and xyzal as below.  -start flonase sensimist 2 SEN per day.    # Pruritus: Helped by BID antihistamines.  -continue cetirizine 10 mg qAM and levocetirizine 5 mg qPM.    # Current cigarette smoker: She is working on weaning, and has a goal of being a non-smoker by her birthday (10/2023).  -encouraged continued efforts toward cessation.    Follow up: 3-4 weeks with Dr. Hull (the appointment with me today was scheduled by mistake as she was trying to schedule follow up with Dr. Hull).      I spent a total of 40 minutes on the day of the visit.  This includes face to face time and non-face to face time preparing to see the patient (eg, review of tests), obtaining and/or reviewing separately obtained history, documenting clinical information in the electronic or other health record, independently interpreting results and communicating results to the patient/family/caregiver, or care coordinator.      Arlet Kennedy MD  Allergy/Immunology

## 2023-04-21 ENCOUNTER — HOSPITAL ENCOUNTER (OUTPATIENT)
Dept: PULMONOLOGY | Facility: CLINIC | Age: 63
Discharge: HOME | End: 2023-04-21
Payer: MEDICARE

## 2023-04-21 DIAGNOSIS — J45.909 ASTHMA, UNSPECIFIED ASTHMA SEVERITY, UNSPECIFIED WHETHER COMPLICATED, UNSPECIFIED WHETHER PERSISTENT: ICD-10-CM

## 2023-04-21 PROCEDURE — 94729 DIFFUSING CAPACITY: CPT | Mod: S$GLB,,, | Performed by: INTERNAL MEDICINE

## 2023-04-21 PROCEDURE — 94729 PR C02/MEMBANE DIFFUSE CAPACITY: ICD-10-PCS | Mod: S$GLB,,, | Performed by: INTERNAL MEDICINE

## 2023-04-21 PROCEDURE — 94060 EVALUATION OF WHEEZING: CPT | Mod: S$GLB,,, | Performed by: INTERNAL MEDICINE

## 2023-04-21 PROCEDURE — 94060 PR EVAL OF BRONCHOSPASM: ICD-10-PCS | Mod: S$GLB,,, | Performed by: INTERNAL MEDICINE

## 2023-04-21 PROCEDURE — 94727 PR PULM FUNCTION TEST BY GAS: ICD-10-PCS | Mod: S$GLB,,, | Performed by: INTERNAL MEDICINE

## 2023-04-21 PROCEDURE — 94727 GAS DIL/WSHOT DETER LNG VOL: CPT | Mod: S$GLB,,, | Performed by: INTERNAL MEDICINE

## 2023-05-22 NOTE — PROGRESS NOTES
"[facility] Note    Chief Complaint      Chief Complaint   Patient presents with    Annual Exam    Establish Care    Arm Pain       History of Present Illness      Aileen Alegria is a 62 y.o. female who presents today for   Chief Complaint   Patient presents with    Annual Exam    Establish Care    Arm Pain         Ms. Alegria presents is new to me and presents to clinic to establish primary care. She also reports right arm pain. She states she had "neck surgery" to resolve this pain years ago. The surgery did resolve the pain but in the past year she has been feeling same pain again. She states she is not diabetic though her last Hgb A1c is 7.1. She has not been taking any medications for diabetes and is very hesitant to do so.       Review of Systems   Musculoskeletal:  Positive for neck pain.        + right arm pain   All 12 systems otherwise negative.     Family History:  family history includes Asthma in her daughter and son; Dementia in her father; Hypertension in her father and sister; Lung cancer in her mother; No Known Problems in her brother, maternal grandfather, maternal grandmother, paternal grandfather, and paternal grandmother; Prostate cancer in her father.   Family history was reviewed with patient.     Medications:  Outpatient Encounter Medications as of 5/29/2023   Medication Sig Dispense Refill    albuterol (ACCUNEB) 1.25 mg/3 mL Nebu USE 1 VIAL PER NEBULIZER EVERY 6 HOURS AS NEEDED FOR SHORTNESS OF BREATH OR WHEEZING 225 mL 1    albuterol (VENTOLIN HFA) 90 mcg/actuation inhaler Inhale 2 puffs into the lungs every 6 (six) hours as needed for Wheezing or Shortness of Breath. Rescue 18 g 2    aspirin (ECOTRIN) 81 MG EC tablet Take 1 tablet (81 mg total) by mouth once daily. 90 tablet 3    atorvastatin (LIPITOR) 10 MG tablet Take 1 tablet (10 mg total) by mouth every evening. 90 tablet 3    budesonide-formoterol 160-4.5 mcg (SYMBICORT) 160-4.5 mcg/actuation HFAA Inhale 2 puffs into the " lungs every 12 (twelve) hours. 20.4 g 6    buPROPion (WELLBUTRIN SR) 150 MG TBSR 12 hr tablet Take 1 tablet (150 mg total) by mouth once daily for 3 days, THEN 1 tablet (150 mg total) 2 (two) times daily. 177 tablet 0    camphor-menthoL 0.2-3.5 % Gel Apply 1 application topically 2 (two) times daily as needed (pain). 113.4 g 3    cyclobenzaprine (FLEXERIL) 10 MG tablet       DULoxetine (CYMBALTA) 30 MG capsule Take 1 capsule (30 mg total) by mouth once daily. 30 capsule 11    fluticasone (FLONASE SENSIMIST) 27.5 mcg/actuation nasal spray 2 sprays by Nasal route once daily. 9.1 mL 11    levocetirizine (XYZAL) 5 MG tablet Take 1 tablet (5 mg total) by mouth every evening. 90 tablet 3    meloxicam (MOBIC) 15 MG tablet Take 15 mg by mouth once daily.      methocarbamoL (ROBAXIN) 500 MG Tab SMARTSI Tablet(s) By Mouth Every 12 Hours      methylPREDNISolone (MEDROL DOSEPACK) 4 mg tablet use as directed 21 each 0    mometasone (NASONEX) 50 mcg/actuation nasal spray 2 sprays by Nasal route 2 (two) times daily. 2 each 5    montelukast (SINGULAIR) 10 mg tablet Take 1 tablet (10 mg total) by mouth every evening. 30 tablet 11    naproxen (NAPROSYN) 500 MG tablet TK 1 T PO BID PRF PAIN      ondansetron (ZOFRAN-ODT) 4 MG TbDL       cetirizine (ZYRTEC) 10 MG tablet Take 1 tablet (10 mg total) by mouth once daily. (Patient not taking: Reported on 2023) 90 tablet 3    famotidine (PEPCID) 20 MG tablet Take 20 mg by mouth once daily.      HYDROcodone-acetaminophen (NORCO) 5-325 mg per tablet Take 1 tablet by mouth every 6 (six) hours as needed.      traMADoL (ULTRAM) 50 mg tablet SMARTSI Tablet(s) By Mouth Every 12 Hours PRN       No facility-administered encounter medications on file as of 2023.       Allergies:  Review of patient's allergies indicates:   Allergen Reactions    Diphenhydramine hcl Anaphylaxis       Health Maintenance:  Health Maintenance   Topic Date Due    Foot Exam  2022    Mammogram  10/08/2022  "   Hemoglobin A1c  12/02/2022    Eye Exam  05/13/2023    Lipid Panel  06/07/2023    Low Dose Statin  05/29/2024    TETANUS VACCINE  02/28/2029    Hepatitis C Screening  Completed     Health Maintenance Topics with due status: Not Due       Topic Last Completion Date    TETANUS VACCINE 02/28/2019    Cervical Cancer Screening 06/04/2021    Lipid Panel 06/07/2022    Low Dose Statin 05/29/2023    Influenza Vaccine Not Due       Physical Exam      Vital Signs  BP: 118/66  BP Location: Right arm  Patient Position: Sitting  Pain Score:   7  Pain Loc: Knee  Height and Weight  Height: 5' 4" (162.6 cm)  Weight: 61.9 kg (136 lb 7.4 oz)  BSA (Calculated - sq m): 1.67 sq meters  BMI (Calculated): 23.4  Weight in (lb) to have BMI = 25: 145.3]    Physical Exam  Vitals reviewed.   Constitutional:       Appearance: Normal appearance. She is normal weight.   HENT:      Head: Normocephalic and atraumatic.      Nose: Nose normal.      Mouth/Throat:      Mouth: Mucous membranes are moist.      Pharynx: Oropharynx is clear.   Eyes:      Extraocular Movements: Extraocular movements intact.      Conjunctiva/sclera: Conjunctivae normal.      Pupils: Pupils are equal, round, and reactive to light.   Cardiovascular:      Rate and Rhythm: Normal rate and regular rhythm.      Pulses: Normal pulses.      Heart sounds: Normal heart sounds.   Pulmonary:      Effort: Pulmonary effort is normal.      Breath sounds: Normal breath sounds.   Musculoskeletal:         General: Normal range of motion.      Cervical back: Normal range of motion and neck supple.   Skin:     General: Skin is warm and dry.      Capillary Refill: Capillary refill takes less than 2 seconds.   Neurological:      General: No focal deficit present.      Mental Status: She is alert and oriented to person, place, and time. Mental status is at baseline.   Psychiatric:         Mood and Affect: Mood normal.         Behavior: Behavior normal.         Thought Content: Thought content " normal.         Judgment: Judgment normal.          Assessment/Plan     Aileen Alegria is a 62 y.o.female with:    Type 2 diabetes mellitus without complication, without long-term current use of insulin  -     Hemoglobin A1C; Future; Expected date: 05/29/2023  -     Ambulatory referral/consult to Podiatry; Future; Expected date: 06/05/2023  -     Ambulatory referral/consult to Ophthalmology; Future; Expected date: 06/05/2023  -     MICROALBUMIN / CREATININE RATIO URINE    Hyperlipidemia due to type 2 diabetes mellitus  -     Lipid Panel; Future; Expected date: 05/29/2023    Fatigue, unspecified type  -     T4, Free; Future; Expected date: 05/29/2023  -     TSH; Future; Expected date: 05/29/2023    Neck pain  -     X-Ray Cervical Spine AP And Lateral; Future; Expected date: 05/29/2023  -     Ambulatory referral/consult to Back & Spine Clinic; Future; Expected date: 06/05/2023    Chronic upper extremity pain, unspecified laterality  -     X-Ray Cervical Spine AP And Lateral; Future; Expected date: 05/29/2023  -     Ambulatory referral/consult to Back & Spine Clinic; Future; Expected date: 06/05/2023    Breast cancer screening by mammogram  -     Mammo Digital Screening Bilat w/ Fabio; Future; Expected date: 05/29/2023    Colon cancer screening  -     Ambulatory referral/consult to Endo Procedure ; Future; Expected date: 05/30/2023        As above, continue current medications and maintain follow up with specialists.  Return to clinic as needed.    Greater than 50% of visit was spent face to face with patient.  All questions were answered to patient's satisfaction.            Karen L Spencer, NP-C [facility]

## 2023-05-29 ENCOUNTER — OFFICE VISIT (OUTPATIENT)
Dept: PRIMARY CARE CLINIC | Facility: CLINIC | Age: 63
End: 2023-05-29
Payer: MEDICARE

## 2023-05-29 VITALS
SYSTOLIC BLOOD PRESSURE: 118 MMHG | WEIGHT: 136.44 LBS | HEIGHT: 64 IN | DIASTOLIC BLOOD PRESSURE: 66 MMHG | BODY MASS INDEX: 23.29 KG/M2

## 2023-05-29 DIAGNOSIS — E11.69 HYPERLIPIDEMIA DUE TO TYPE 2 DIABETES MELLITUS: ICD-10-CM

## 2023-05-29 DIAGNOSIS — M79.603 CHRONIC UPPER EXTREMITY PAIN, UNSPECIFIED LATERALITY: ICD-10-CM

## 2023-05-29 DIAGNOSIS — E78.5 HYPERLIPIDEMIA DUE TO TYPE 2 DIABETES MELLITUS: ICD-10-CM

## 2023-05-29 DIAGNOSIS — R53.83 FATIGUE, UNSPECIFIED TYPE: ICD-10-CM

## 2023-05-29 DIAGNOSIS — Z12.31 BREAST CANCER SCREENING BY MAMMOGRAM: ICD-10-CM

## 2023-05-29 DIAGNOSIS — G89.29 CHRONIC UPPER EXTREMITY PAIN, UNSPECIFIED LATERALITY: ICD-10-CM

## 2023-05-29 DIAGNOSIS — E11.9 TYPE 2 DIABETES MELLITUS WITHOUT COMPLICATION, WITHOUT LONG-TERM CURRENT USE OF INSULIN: Primary | ICD-10-CM

## 2023-05-29 DIAGNOSIS — M54.2 NECK PAIN: ICD-10-CM

## 2023-05-29 DIAGNOSIS — Z12.11 COLON CANCER SCREENING: ICD-10-CM

## 2023-05-29 LAB
ALBUMIN/CREAT UR: ABNORMAL UG/MG (ref 0–30)
CREAT UR-MCNC: >450 MG/DL (ref 15–325)
MICROALBUMIN UR DL<=1MG/L-MCNC: 32 UG/ML

## 2023-05-29 PROCEDURE — 99999 PR PBB SHADOW E&M-EST. PATIENT-LVL V: ICD-10-PCS | Mod: PBBFAC,,, | Performed by: NURSE PRACTITIONER

## 2023-05-29 PROCEDURE — 82570 ASSAY OF URINE CREATININE: CPT | Performed by: NURSE PRACTITIONER

## 2023-05-29 PROCEDURE — 3074F SYST BP LT 130 MM HG: CPT | Mod: CPTII,S$GLB,, | Performed by: NURSE PRACTITIONER

## 2023-05-29 PROCEDURE — 1160F RVW MEDS BY RX/DR IN RCRD: CPT | Mod: CPTII,S$GLB,, | Performed by: NURSE PRACTITIONER

## 2023-05-29 PROCEDURE — 99999 PR PBB SHADOW E&M-EST. PATIENT-LVL V: CPT | Mod: PBBFAC,,, | Performed by: NURSE PRACTITIONER

## 2023-05-29 PROCEDURE — 1160F PR REVIEW ALL MEDS BY PRESCRIBER/CLIN PHARMACIST DOCUMENTED: ICD-10-PCS | Mod: CPTII,S$GLB,, | Performed by: NURSE PRACTITIONER

## 2023-05-29 PROCEDURE — 3078F DIAST BP <80 MM HG: CPT | Mod: CPTII,S$GLB,, | Performed by: NURSE PRACTITIONER

## 2023-05-29 PROCEDURE — 3074F PR MOST RECENT SYSTOLIC BLOOD PRESSURE < 130 MM HG: ICD-10-PCS | Mod: CPTII,S$GLB,, | Performed by: NURSE PRACTITIONER

## 2023-05-29 PROCEDURE — 3078F PR MOST RECENT DIASTOLIC BLOOD PRESSURE < 80 MM HG: ICD-10-PCS | Mod: CPTII,S$GLB,, | Performed by: NURSE PRACTITIONER

## 2023-05-29 PROCEDURE — 3008F PR BODY MASS INDEX (BMI) DOCUMENTED: ICD-10-PCS | Mod: CPTII,S$GLB,, | Performed by: NURSE PRACTITIONER

## 2023-05-29 PROCEDURE — 1159F PR MEDICATION LIST DOCUMENTED IN MEDICAL RECORD: ICD-10-PCS | Mod: CPTII,S$GLB,, | Performed by: NURSE PRACTITIONER

## 2023-05-29 PROCEDURE — 99214 PR OFFICE/OUTPT VISIT, EST, LEVL IV, 30-39 MIN: ICD-10-PCS | Mod: S$GLB,,, | Performed by: NURSE PRACTITIONER

## 2023-05-29 PROCEDURE — 3008F BODY MASS INDEX DOCD: CPT | Mod: CPTII,S$GLB,, | Performed by: NURSE PRACTITIONER

## 2023-05-29 PROCEDURE — 1159F MED LIST DOCD IN RCRD: CPT | Mod: CPTII,S$GLB,, | Performed by: NURSE PRACTITIONER

## 2023-05-29 PROCEDURE — 99214 OFFICE O/P EST MOD 30 MIN: CPT | Mod: S$GLB,,, | Performed by: NURSE PRACTITIONER

## 2023-05-30 ENCOUNTER — HOSPITAL ENCOUNTER (OUTPATIENT)
Dept: RADIOLOGY | Facility: HOSPITAL | Age: 63
Discharge: HOME | End: 2023-05-30
Attending: NURSE PRACTITIONER
Payer: MEDICARE

## 2023-05-30 VITALS — BODY MASS INDEX: 22.49 KG/M2 | HEIGHT: 65 IN | WEIGHT: 135 LBS

## 2023-05-30 DIAGNOSIS — M54.2 NECK PAIN: ICD-10-CM

## 2023-05-30 DIAGNOSIS — M79.603 CHRONIC UPPER EXTREMITY PAIN, UNSPECIFIED LATERALITY: ICD-10-CM

## 2023-05-30 DIAGNOSIS — G89.29 CHRONIC UPPER EXTREMITY PAIN, UNSPECIFIED LATERALITY: ICD-10-CM

## 2023-05-30 DIAGNOSIS — Z12.31 BREAST CANCER SCREENING BY MAMMOGRAM: ICD-10-CM

## 2023-05-30 PROCEDURE — 72040 X-RAY EXAM NECK SPINE 2-3 VW: CPT | Mod: 26,,, | Performed by: RADIOLOGY

## 2023-05-30 PROCEDURE — 77067 MAMMO DIGITAL SCREENING BILAT WITH TOMO: ICD-10-PCS | Mod: 26,,, | Performed by: RADIOLOGY

## 2023-05-30 PROCEDURE — 77063 BREAST TOMOSYNTHESIS BI: CPT | Mod: 26,,, | Performed by: RADIOLOGY

## 2023-05-30 PROCEDURE — 77067 SCR MAMMO BI INCL CAD: CPT | Mod: TC

## 2023-05-30 PROCEDURE — 77067 SCR MAMMO BI INCL CAD: CPT | Mod: 26,,, | Performed by: RADIOLOGY

## 2023-05-30 PROCEDURE — 77063 MAMMO DIGITAL SCREENING BILAT WITH TOMO: ICD-10-PCS | Mod: 26,,, | Performed by: RADIOLOGY

## 2023-05-30 PROCEDURE — 72040 X-RAY EXAM NECK SPINE 2-3 VW: CPT | Mod: TC,PN

## 2023-05-30 PROCEDURE — 72040 XR CERVICAL SPINE AP LATERAL: ICD-10-PCS | Mod: 26,,, | Performed by: RADIOLOGY

## 2023-06-05 ENCOUNTER — TELEPHONE (OUTPATIENT)
Dept: ALLERGY | Facility: CLINIC | Age: 63
End: 2023-06-05
Payer: MEDICARE

## 2023-06-05 ENCOUNTER — PATIENT MESSAGE (OUTPATIENT)
Dept: ALLERGY | Facility: CLINIC | Age: 63
End: 2023-06-05
Payer: MEDICARE

## 2023-06-05 NOTE — TELEPHONE ENCOUNTER
Good morning Dr. Hull,    Following a message received from Dr. Kennedy, to make contact with the patient to schedule an appointment with you, I was unable to make contact.    Voice message left for patient to make contact to assist her with making the appointment.    Kind regards  Grayson Alanis MA      ----- Message from Arlet Kennedy MD sent at 6/4/2023  2:59 PM CDT -----  Regarding: Reshcedule Dr. Hull appt  [facility],  This is a patient of Dr. Hull's who had accidentally scheduled herself to see me in April. I saw her, but she needed fairly soon follow up as her asthma wasn't well controlled. So we had scheduled her follow up with Dr. Hull in May, but she no-showed. I don't think she is good with using the portal. Can someone please call her to reschedule her appointment with Dr. Hull?    Thanks,  Arlet Kennedy MD  Allergy/Immunology

## 2023-06-05 NOTE — TELEPHONE ENCOUNTER
----- Message from Arlet Kennedy MD sent at 6/4/2023  2:59 PM CDT -----  Regarding: Reshcedule Dr. Hull appt  [facility],  This is a patient of Dr. Hull's who had accidentally scheduled herself to see me in April. I saw her, but she needed fairly soon follow up as her asthma wasn't well controlled. So we had scheduled her follow up with Dr. Hull in May, but she no-showed. I don't think she is good with using the portal. Can someone please call her to reschedule her appointment with Dr. Hull?    Thanks,  Arlet Kennedy MD  Allergy/Immunology

## 2023-07-24 ENCOUNTER — HOSPITAL ENCOUNTER (OUTPATIENT)
Dept: RADIOLOGY | Facility: OTHER | Age: 63
Discharge: HOME | End: 2023-07-24
Attending: STUDENT IN AN ORGANIZED HEALTH CARE EDUCATION/TRAINING PROGRAM
Payer: MEDICARE

## 2023-07-24 ENCOUNTER — OFFICE VISIT (OUTPATIENT)
Dept: SPINE | Facility: CLINIC | Age: 63
End: 2023-07-24
Payer: MEDICARE

## 2023-07-24 VITALS
RESPIRATION RATE: 18 BRPM | SYSTOLIC BLOOD PRESSURE: 150 MMHG | OXYGEN SATURATION: 99 % | BODY MASS INDEX: 24.3 KG/M2 | WEIGHT: 137.13 LBS | HEIGHT: 63 IN | HEART RATE: 81 BPM | DIASTOLIC BLOOD PRESSURE: 86 MMHG | TEMPERATURE: 98 F

## 2023-07-24 DIAGNOSIS — M54.2 NECK PAIN: ICD-10-CM

## 2023-07-24 DIAGNOSIS — M50.30 DDD (DEGENERATIVE DISC DISEASE), CERVICAL: ICD-10-CM

## 2023-07-24 DIAGNOSIS — Z98.890 PERSONAL HISTORY OF SPINE SURGERY: ICD-10-CM

## 2023-07-24 DIAGNOSIS — M96.1 POST LAMINECTOMY SYNDROME: ICD-10-CM

## 2023-07-24 DIAGNOSIS — M54.12 CERVICAL RADICULOPATHY: Primary | ICD-10-CM

## 2023-07-24 PROCEDURE — 99214 PR OFFICE/OUTPT VISIT, EST, LEVL IV, 30-39 MIN: ICD-10-PCS | Mod: S$GLB,,, | Performed by: ANESTHESIOLOGY

## 2023-07-24 PROCEDURE — 3077F PR MOST RECENT SYSTOLIC BLOOD PRESSURE >= 140 MM HG: ICD-10-PCS | Mod: CPTII,S$GLB,, | Performed by: ANESTHESIOLOGY

## 2023-07-24 PROCEDURE — 3061F PR NEG MICROALBUMINURIA RESULT DOCUMENTED/REVIEW: ICD-10-PCS | Mod: CPTII,S$GLB,, | Performed by: ANESTHESIOLOGY

## 2023-07-24 PROCEDURE — 3061F NEG MICROALBUMINURIA REV: CPT | Mod: CPTII,S$GLB,, | Performed by: ANESTHESIOLOGY

## 2023-07-24 PROCEDURE — 3008F BODY MASS INDEX DOCD: CPT | Mod: CPTII,S$GLB,, | Performed by: ANESTHESIOLOGY

## 2023-07-24 PROCEDURE — 72052 X-RAY EXAM NECK SPINE 6/>VWS: CPT | Mod: TC,FY

## 2023-07-24 PROCEDURE — 3008F PR BODY MASS INDEX (BMI) DOCUMENTED: ICD-10-PCS | Mod: CPTII,S$GLB,, | Performed by: ANESTHESIOLOGY

## 2023-07-24 PROCEDURE — 3066F NEPHROPATHY DOC TX: CPT | Mod: CPTII,S$GLB,, | Performed by: ANESTHESIOLOGY

## 2023-07-24 PROCEDURE — 99214 OFFICE O/P EST MOD 30 MIN: CPT | Mod: S$GLB,,, | Performed by: ANESTHESIOLOGY

## 2023-07-24 PROCEDURE — 3079F PR MOST RECENT DIASTOLIC BLOOD PRESSURE 80-89 MM HG: ICD-10-PCS | Mod: CPTII,S$GLB,, | Performed by: ANESTHESIOLOGY

## 2023-07-24 PROCEDURE — 1159F MED LIST DOCD IN RCRD: CPT | Mod: CPTII,S$GLB,, | Performed by: ANESTHESIOLOGY

## 2023-07-24 PROCEDURE — 3077F SYST BP >= 140 MM HG: CPT | Mod: CPTII,S$GLB,, | Performed by: ANESTHESIOLOGY

## 2023-07-24 PROCEDURE — 3044F PR MOST RECENT HEMOGLOBIN A1C LEVEL <7.0%: ICD-10-PCS | Mod: CPTII,S$GLB,, | Performed by: ANESTHESIOLOGY

## 2023-07-24 PROCEDURE — 72052 XR CERVICAL SPINE 5 VIEW WITH FLEX AND EXT: ICD-10-PCS | Mod: 26,,, | Performed by: RADIOLOGY

## 2023-07-24 PROCEDURE — 72052 X-RAY EXAM NECK SPINE 6/>VWS: CPT | Mod: 26,,, | Performed by: RADIOLOGY

## 2023-07-24 PROCEDURE — 3066F PR DOCUMENTATION OF TREATMENT FOR NEPHROPATHY: ICD-10-PCS | Mod: CPTII,S$GLB,, | Performed by: ANESTHESIOLOGY

## 2023-07-24 PROCEDURE — 3044F HG A1C LEVEL LT 7.0%: CPT | Mod: CPTII,S$GLB,, | Performed by: ANESTHESIOLOGY

## 2023-07-24 PROCEDURE — 1159F PR MEDICATION LIST DOCUMENTED IN MEDICAL RECORD: ICD-10-PCS | Mod: CPTII,S$GLB,, | Performed by: ANESTHESIOLOGY

## 2023-07-24 PROCEDURE — 99999 PR PBB SHADOW E&M-EST. PATIENT-LVL V: ICD-10-PCS | Mod: PBBFAC,,, | Performed by: ANESTHESIOLOGY

## 2023-07-24 PROCEDURE — 99999 PR PBB SHADOW E&M-EST. PATIENT-LVL V: CPT | Mod: PBBFAC,,, | Performed by: ANESTHESIOLOGY

## 2023-07-24 PROCEDURE — 3079F DIAST BP 80-89 MM HG: CPT | Mod: CPTII,S$GLB,, | Performed by: ANESTHESIOLOGY

## 2023-07-24 RX ORDER — PREGABALIN 75 MG/1
75 CAPSULE ORAL 2 TIMES DAILY
Qty: 60 CAPSULE | Refills: 2 | Status: SHIPPED | OUTPATIENT
Start: 2023-07-24

## 2023-07-24 NOTE — PROGRESS NOTES
PCP: Prabha Mancilla NP    REFERRING PHYSICIAN: Prabha Mancilla NP    CHIEF COMPLAINT: Neck and Right arm pain    Original HISTORY OF PRESENT ILLNESS: Aileen Alegria presents to the clinic for the evaluation of the above pain. The pain started in  after years of being a  in [place].    Original Pain Description:  The pain is located in her right neck and radiates to the right upper extremity. Patient is s/p ACDF in 2020. The pain is described as aching, shooting, stabbing, and tingling. Exacerbating factors: activity, holding things. Mitigating factors heat, laying down, medications, and rest. Symptoms interfere with daily activity, sleeping, and work. Patient states that she gets 2 hours of uninterrupted sleep a night. The patient feels like symptoms have been worsening. Patient denies night fever/night sweats. Patient endorses occasional numbness and weakness in her right arm and bilateral lower extremities. She also endorses urinary incontinence that has been present for years, but also endorses bowel incontinence that has been more frequent as of late.    Original PAIN SCORES:  Best: Pain is 9  Worst: Pain is 10  Current: Pain is 9    INTERVAL HISTORY:     6 weeks of Conservative therapy:  PT: 2022 - 2022  Chiro: Denies  HEP: Yes      Treatments / Medications: (Ice/Heat/NSAIDS/APAP/etc):  - Naproxen - Some relief  - Norco 5-325 - Some relief in past  - Tramadol - No relief  - Tylenol - upsets stomach      Interventional Pain Procedures: (Previous injections)  - Right shoulder injection in     Past Medical History:   Diagnosis Date    Allergy     Environmental and seasonal allergies     Moderate persistent asthma without complication 2022     Past Surgical History:   Procedure Laterality Date     SECTION      NECK SURGERY  2020    C 2-C7     Social History     Socioeconomic History    Marital status: Single   Tobacco Use    Smoking status: Every  Day     Packs/day: 1.00     Types: Cigarettes    Smokeless tobacco: Never   Substance and Sexual Activity    Alcohol use: Yes     Comment: socially    Sexual activity: Not Currently     Family History   Problem Relation Age of Onset    Lung cancer Mother     Dementia Father     Hypertension Father     Prostate cancer Father     Hypertension Sister     No Known Problems Brother     No Known Problems Maternal Grandmother     No Known Problems Maternal Grandfather     No Known Problems Paternal Grandmother     No Known Problems Paternal Grandfather     Asthma Daughter     Asthma Son        Review of patient's allergies indicates:   Allergen Reactions    Diphenhydramine hcl Anaphylaxis       Current Outpatient Medications   Medication Sig    albuterol (ACCUNEB) 1.25 mg/3 mL Nebu USE 1 VIAL PER NEBULIZER EVERY 6 HOURS AS NEEDED FOR SHORTNESS OF BREATH OR WHEEZING    albuterol (VENTOLIN HFA) 90 mcg/actuation inhaler Inhale 2 puffs into the lungs every 6 (six) hours as needed for Wheezing or Shortness of Breath. Rescue    budesonide-formoterol 160-4.5 mcg (SYMBICORT) 160-4.5 mcg/actuation HFAA Inhale 2 puffs into the lungs every 12 (twelve) hours.    cetirizine (ZYRTEC) 10 MG tablet Take 1 tablet (10 mg total) by mouth once daily.    cyclobenzaprine (FLEXERIL) 10 MG tablet     famotidine (PEPCID) 20 MG tablet Take 20 mg by mouth once daily.    fluticasone (FLONASE SENSIMIST) 27.5 mcg/actuation nasal spray 2 sprays by Nasal route once daily.    levocetirizine (XYZAL) 5 MG tablet Take 1 tablet (5 mg total) by mouth every evening.    methocarbamoL (ROBAXIN) 500 MG Tab SMARTSI Tablet(s) By Mouth Every 12 Hours    mometasone (NASONEX) 50 mcg/actuation nasal spray 2 sprays by Nasal route 2 (two) times daily.    montelukast (SINGULAIR) 10 mg tablet Take 1 tablet (10 mg total) by mouth every evening.    naproxen (NAPROSYN) 500 MG tablet TK 1 T PO BID PRF PAIN    ondansetron (ZOFRAN-ODT) 4 MG TbDL     pneumoc 20-faheem conj-dip  "cr,PF, (PREVNAR 20, PF,) 0.5 mL Syrg injection Inject into the muscle.    aspirin (ECOTRIN) 81 MG EC tablet Take 1 tablet (81 mg total) by mouth once daily.    atorvastatin (LIPITOR) 10 MG tablet Take 1 tablet (10 mg total) by mouth every evening.    buPROPion (WELLBUTRIN SR) 150 MG TBSR 12 hr tablet Take 1 tablet (150 mg total) by mouth once daily for 3 days, THEN 1 tablet (150 mg total) 2 (two) times daily.    camphor-menthoL 0.2-3.5 % Gel Apply 1 application topically 2 (two) times daily as needed (pain).    DULoxetine (CYMBALTA) 30 MG capsule Take 1 capsule (30 mg total) by mouth once daily.    HYDROcodone-acetaminophen (NORCO) 5-325 mg per tablet Take 1 tablet by mouth every 6 (six) hours as needed.    meloxicam (MOBIC) 15 MG tablet Take 15 mg by mouth once daily.    methylPREDNISolone (MEDROL DOSEPACK) 4 mg tablet use as directed (Patient not taking: Reported on 2023)    traMADoL (ULTRAM) 50 mg tablet SMARTSI Tablet(s) By Mouth Every 12 Hours PRN     No current facility-administered medications for this visit.       ROS:  GENERAL: No fever. No chills. No fatigue. Denies weight loss. Denies weight gain.  HEENT: Denies headaches. Denies vision change. Denies eye pain. Denies double vision. Denies ear pain.   CV: Denies chest pain.   PULM: Denies of shortness of breath.  GI: Denies constipation. No diarrhea. No abdominal pain. Denies nausea. Denies vomiting. No blood in stool.  HEME: Denies bleeding problems.  : Denies urgency. No painful urination. No blood in urine.  MS: Denies joint stiffness. Denies joint swelling.  Denies back pain.  SKIN: Denies rash.   NEURO: Denies seizures. No weakness.  PSYCH:  Denies difficulty sleeping. No anxiety. Denies depression. No suicidal thoughts.       VITALS:   Vitals:    23 0825   BP: (!) 150/86   Pulse: 81   Resp: 18   Temp: 98.2 °F (36.8 °C)   TempSrc: Oral   SpO2: 99%   Weight: 62.2 kg (137 lb 2 oz)   Height: 5' 3" (1.6 m)   PainSc:   9   PainLoc: Back "         PHYSICAL EXAM:   GENERAL: Well appearing, in no acute distress, alert and oriented x3.  PSYCH:  Mood and affect appropriate.  SKIN: Skin color, texture, turgor normal, no rashes or lesions.  HEENT:  Normocephalic, atraumatic. Cranial nerves grossly intact.  NECK: TTP over the cervical paraspinous muscles (R>L). No pain to palpation over facets. Pain with neck flexion, extension, or lateral flexion. (Worse with extension on R)  PULM: No evidence of respiratory difficulty, symmetric chest rise.  GI:  Non-distended  BACK: Normal range of motion. TTP Lumbar Paraspinous muscles. No pain to palpation over facet joints. There is no pain with palpation over the sacroiliac joints bilaterally.   EXTREMITIES: No deformities, edema, or skin discoloration.   MUSCULOSKELETAL: Shoulder, hip, and knee provocative maneuvers are negative. No atrophy is noted.  NEURO: Sensation is equal and appropriate bilaterally. Bilateral upper and lower extremity strength is normal and symmetric. Bilateral upper and lower extremity coordination and muscle stretch reflexes are physiologic and symmetric. Plantar response are downgoing. Straight leg raising in the supine position is negative to radicular pain.   GAIT: normal.      LABS:      IMAGING:      X-Ray Cervical Spine AP And Lateral  Order: [id number]  Status: Final result     Visible to patient: Yes (not seen)     Next appt: 08/04/2023 at 09:00 AM in Podiatry (Cheli Andersno DPM)     Dx: Neck pain; Chronic upper extremity pa...     0 Result Notes  Details    Reading Physician Reading Date Result Priority   Wiliam Freire MD  [phone]  [phone] 5/30/2023 Routine     Narrative & Impression  EXAMINATION:  XR CERVICAL SPINE AP LATERAL     CLINICAL HISTORY:  Cervicalgia     TECHNIQUE:  AP, lateral and open mouth views of the cervical spine were performed.     COMPARISON:  None.     FINDINGS:  Postop laminectomy C3-C6, placement metal fixation devices remaining right  posterior C4, C5 and C6 lamina pedicles.  Limited levoscoliosis cervicothoracic junction, C1-C2 mild anterior DJD change, airway neck soft tissues normal.  Straightening usual lordotic curve with mild kyphosis C3-C4, moderate degenerative disc spondylosis C3-C6.     Impression:     No fracture subluxation.  Additional findings above.        Electronically signed by: Wiliam Freire MD  Date:                                            05/30/2023  Time:                                           09:30     MRI CERVICAL SPINE WITHOUT CONTRAST  Order: [id number]  Impression      Significant chronic multilevel discogenic degenerative changes are described in detail above. No acute condition is evident.   The most abnormal level, C5-C6, appears to lateralize right but the patient's symptoms lateralizes left.   .       Electronically Signed By: Levi Horn 6/17/2019 3:20 PM CDT  Narrative    This result has an attachment that is not available.   EXAM END TIME:6/17/2019 02:58 PM   CLINICAL HISTORY:   DIAGNOSIS:M54.12   Cervical radiculopathy   REASON FOR STUDY:Cervical radiculopathy   ADDITIONAL HISTORY: 58-year-old female with neck pain and left shoulder pain.     TECHNIQUE:   Multiplanar multisequence nongadolinium MRI cervical spine per departmental protocol is interpreted with comparison to previous plain film radiographs of May 13, 2015     FINDINGS:     There is multilevel chronic discogenic degenerative changes throughout this level of the spine with reversed lordosis about C3-C4 and C4-C5 levels. Some of these abnormalities is no obvious on the preceding radiographs from 2015.     Included upper thoracic levels, through C7-T1, reveal no significant abnormality.     C6-C7 reveals internal nuclear disc degeneration with slight loss of nuclear signal and a broad disc osteophyte complex. The spinal canal is borderline stenotic and ventral cord is slightly flattened, without circumferential cord compression or  abnormal cord signal. The foramina are encroached, but do not appear frankly stenotic.     C5-C6 reveals somewhat more advanced degenerative disc collapse and endplate irregularity with a larger disc osteophyte complex. This complex has lateral accentuation, greater to the right, with right neural foraminal stenosis, mild maria eugenia spinal canal stenosis, and slight circumferential spinal cord compression without abnormal internal cord signal. The right foramen is frankly stenotic.     C4-C5 also has degenerative disc narrowing with a broad nonlateralizing disc osteophyte complex, cord compression with ventral cord flattening, borderline spinal canal, but patent foramina.     C3-C4 reveals an even lesser disc osteophyte complex with left lateralization, slight cord impingement, and minimally asymmetric cord flattening without cervical cord compression or sign of myelopathy.     C2-C3, C1-C2, and the craniocervical junction are relatively unremarkable.     There is no sign of myelopathy. No fracture, destructive bone lesion, surgical alteration, foreign object is evident at this level.   Exam End: 06/17/19 14:58    Specimen Collected: 06/17/19 15:08 Last Resulted: 06/17/19 15:20   Received From: [facility]  Result Received: 05/29/23 13:24       ASSESSMENT: 62 y.o. year old female with pain, consistent with:    Encounter Diagnoses   Name Primary?    Neck pain     Chronic upper extremity pain, unspecified laterality     DDD (degenerative disc disease), cervical     Post laminectomy syndrome Yes    Cervical radiculopathy        DISCUSSION: Ms. Alegria is a retired  who now is a . She underwent a C3-6 cervical laminectomy in 2020 without benefit. She comes to us with persistent right neck and arm pain since that time. Her worst pain is at the end of the day. She has no weakness on exam.       PLAN:  Start Lyrica 75mg BID  Order X ray Cervical spine w/ flexion and extension  Order Cervical  MRI with contrast  Follow up in 2 weeks to review imaging, neuropathic pain regimen, and consider URI    I would like to thank Prabha Mancilla, EDEN for the opportunity to assist in the care of this patient. We had a very nice visit and I look forward to continuing their care. Please let me know if I can be of further assistance.     Jeff Morin MD  07/24/2023

## 2023-08-03 ENCOUNTER — HOSPITAL ENCOUNTER (OUTPATIENT)
Dept: RADIOLOGY | Facility: OTHER | Age: 63
Discharge: HOME | End: 2023-08-03
Attending: STUDENT IN AN ORGANIZED HEALTH CARE EDUCATION/TRAINING PROGRAM
Payer: MEDICARE

## 2023-08-03 DIAGNOSIS — Z98.890 PERSONAL HISTORY OF SPINE SURGERY: ICD-10-CM

## 2023-08-03 PROCEDURE — 72156 MRI CERVICAL SPINE W WO CONTRAST: ICD-10-PCS | Mod: 26,,, | Performed by: RADIOLOGY

## 2023-08-03 PROCEDURE — A9585 GADOBUTROL INJECTION: HCPCS | Performed by: STUDENT IN AN ORGANIZED HEALTH CARE EDUCATION/TRAINING PROGRAM

## 2023-08-03 PROCEDURE — 72156 MRI NECK SPINE W/O & W/DYE: CPT | Mod: 26,,, | Performed by: RADIOLOGY

## 2023-08-03 PROCEDURE — 72156 MRI NECK SPINE W/O & W/DYE: CPT | Mod: TC

## 2023-08-03 PROCEDURE — 25500020 PHARM REV CODE 255: Performed by: STUDENT IN AN ORGANIZED HEALTH CARE EDUCATION/TRAINING PROGRAM

## 2023-08-03 RX ORDER — GADOBUTROL 604.72 MG/ML
6 INJECTION INTRAVENOUS
Status: COMPLETED | OUTPATIENT
Start: 2023-08-03 | End: 2023-08-03

## 2023-08-03 RX ADMIN — GADOBUTROL 6 ML: 604.72 INJECTION INTRAVENOUS at 12:08

## 2023-08-04 ENCOUNTER — OFFICE VISIT (OUTPATIENT)
Dept: PODIATRY | Facility: CLINIC | Age: 63
End: 2023-08-04
Payer: MEDICARE

## 2023-08-04 VITALS
DIASTOLIC BLOOD PRESSURE: 86 MMHG | HEIGHT: 63 IN | RESPIRATION RATE: 18 BRPM | HEART RATE: 74 BPM | SYSTOLIC BLOOD PRESSURE: 138 MMHG | WEIGHT: 136.69 LBS | BODY MASS INDEX: 24.22 KG/M2

## 2023-08-04 DIAGNOSIS — F17.210 CIGARETTE NICOTINE DEPENDENCE WITHOUT COMPLICATION: ICD-10-CM

## 2023-08-04 DIAGNOSIS — E11.9 ENCOUNTER FOR DIABETIC FOOT EXAM: Primary | ICD-10-CM

## 2023-08-04 DIAGNOSIS — E11.9 TYPE 2 DIABETES MELLITUS WITHOUT COMPLICATION, WITHOUT LONG-TERM CURRENT USE OF INSULIN: ICD-10-CM

## 2023-08-04 PROCEDURE — 3066F PR DOCUMENTATION OF TREATMENT FOR NEPHROPATHY: ICD-10-PCS | Mod: CPTII,S$GLB,, | Performed by: PODIATRIST

## 2023-08-04 PROCEDURE — 3061F PR NEG MICROALBUMINURIA RESULT DOCUMENTED/REVIEW: ICD-10-PCS | Mod: CPTII,S$GLB,, | Performed by: PODIATRIST

## 2023-08-04 PROCEDURE — 1160F PR REVIEW ALL MEDS BY PRESCRIBER/CLIN PHARMACIST DOCUMENTED: ICD-10-PCS | Mod: CPTII,S$GLB,, | Performed by: PODIATRIST

## 2023-08-04 PROCEDURE — 1160F RVW MEDS BY RX/DR IN RCRD: CPT | Mod: CPTII,S$GLB,, | Performed by: PODIATRIST

## 2023-08-04 PROCEDURE — 99999 PR PBB SHADOW E&M-EST. PATIENT-LVL V: CPT | Mod: PBBFAC,,, | Performed by: PODIATRIST

## 2023-08-04 PROCEDURE — 3079F PR MOST RECENT DIASTOLIC BLOOD PRESSURE 80-89 MM HG: ICD-10-PCS | Mod: CPTII,S$GLB,, | Performed by: PODIATRIST

## 2023-08-04 PROCEDURE — 99203 OFFICE O/P NEW LOW 30 MIN: CPT | Mod: S$GLB,,, | Performed by: PODIATRIST

## 2023-08-04 PROCEDURE — 3066F NEPHROPATHY DOC TX: CPT | Mod: CPTII,S$GLB,, | Performed by: PODIATRIST

## 2023-08-04 PROCEDURE — 99203 PR OFFICE/OUTPT VISIT, NEW, LEVL III, 30-44 MIN: ICD-10-PCS | Mod: S$GLB,,, | Performed by: PODIATRIST

## 2023-08-04 PROCEDURE — 3079F DIAST BP 80-89 MM HG: CPT | Mod: CPTII,S$GLB,, | Performed by: PODIATRIST

## 2023-08-04 PROCEDURE — 3044F HG A1C LEVEL LT 7.0%: CPT | Mod: CPTII,S$GLB,, | Performed by: PODIATRIST

## 2023-08-04 PROCEDURE — 3061F NEG MICROALBUMINURIA REV: CPT | Mod: CPTII,S$GLB,, | Performed by: PODIATRIST

## 2023-08-04 PROCEDURE — 1159F PR MEDICATION LIST DOCUMENTED IN MEDICAL RECORD: ICD-10-PCS | Mod: CPTII,S$GLB,, | Performed by: PODIATRIST

## 2023-08-04 PROCEDURE — 3008F BODY MASS INDEX DOCD: CPT | Mod: CPTII,S$GLB,, | Performed by: PODIATRIST

## 2023-08-04 PROCEDURE — 1159F MED LIST DOCD IN RCRD: CPT | Mod: CPTII,S$GLB,, | Performed by: PODIATRIST

## 2023-08-04 PROCEDURE — 3075F PR MOST RECENT SYSTOLIC BLOOD PRESS GE 130-139MM HG: ICD-10-PCS | Mod: CPTII,S$GLB,, | Performed by: PODIATRIST

## 2023-08-04 PROCEDURE — 99999 PR PBB SHADOW E&M-EST. PATIENT-LVL V: ICD-10-PCS | Mod: PBBFAC,,, | Performed by: PODIATRIST

## 2023-08-04 PROCEDURE — 3008F PR BODY MASS INDEX (BMI) DOCUMENTED: ICD-10-PCS | Mod: CPTII,S$GLB,, | Performed by: PODIATRIST

## 2023-08-04 PROCEDURE — 3044F PR MOST RECENT HEMOGLOBIN A1C LEVEL <7.0%: ICD-10-PCS | Mod: CPTII,S$GLB,, | Performed by: PODIATRIST

## 2023-08-04 PROCEDURE — 3075F SYST BP GE 130 - 139MM HG: CPT | Mod: CPTII,S$GLB,, | Performed by: PODIATRIST

## 2023-08-04 NOTE — PROGRESS NOTES
"Subjective:      Patient ID: Aileen Alegria is a 62 y.o. female.    Chief Complaint:   PCP (Prabha Mancilla, NP  5/29/23//), Diabetic Foot Exam, and Nail Care    Aileen is a 62 y.o. female who presents to the clinic upon referral from Dr. Mancilla  for evaluation and treatment of diabetic feet. Aileen has a past medical history of Allergy, Environmental and seasonal allergies, and Moderate persistent asthma without complication (4/8/2022). Patient relates no major problem with feet.     Pt relates " I am not diabetic but my doctor wanted me to come"    Patient relates she is not on medicine she does not feel herself has diabetic  She is focused on her diet in eating healthy  She is down to half a pack per day of cigarettes    No issues with shoe gear        PCP: Prabha Mancilla NP    Date Last Seen by PCP: 5/29/23    Current shoe gear: Slip-on shoes    Hemoglobin A1C   Date Value Ref Range Status   05/30/2023 6.7 (H) 4.0 - 5.6 % Final     Comment:     ADA Screening Guidelines:  5.7-6.4%  Consistent with prediabetes  >or=6.5%  Consistent with diabetes    High levels of fetal hemoglobin interfere with the HbA1C  assay. Heterozygous hemoglobin variants (HbS, HgC, etc)do  not significantly interfere with this assay.   However, presence of multiple variants may affect accuracy.     06/02/2022 7.1 (H) 4.0 - 5.6 % Final     Comment:     ADA Screening Guidelines:  5.7-6.4%  Consistent with prediabetes  >or=6.5%  Consistent with diabetes    High levels of fetal hemoglobin interfere with the HbA1C  assay. Heterozygous hemoglobin variants (HbS, HgC, etc)do  not significantly interfere with this assay.   However, presence of multiple variants may affect accuracy.     05/28/2021 6.6 (H) 4.0 - 5.6 % Final     Comment:     ADA Screening Guidelines:  5.7-6.4%  Consistent with prediabetes  >or=6.5%  Consistent with diabetes    High levels of fetal hemoglobin interfere with the HbA1C  assay. Heterozygous hemoglobin variants (HbS, " HgC, etc)do  not significantly interfere with this assay.   However, presence of multiple variants may affect accuracy.            Past Medical History:   Diagnosis Date    Allergy     Environmental and seasonal allergies     Moderate persistent asthma without complication 2022     Past Surgical History:   Procedure Laterality Date     SECTION      NECK SURGERY  2020    C 2-C7     Current Outpatient Medications on File Prior to Visit   Medication Sig Dispense Refill    albuterol (ACCUNEB) 1.25 mg/3 mL Nebu USE 1 VIAL PER NEBULIZER EVERY 6 HOURS AS NEEDED FOR SHORTNESS OF BREATH OR WHEEZING 225 mL 1    albuterol (VENTOLIN HFA) 90 mcg/actuation inhaler Inhale 2 puffs into the lungs every 6 (six) hours as needed for Wheezing or Shortness of Breath. Rescue 18 g 2    budesonide-formoterol 160-4.5 mcg (SYMBICORT) 160-4.5 mcg/actuation HFAA Inhale 2 puffs into the lungs every 12 (twelve) hours. 20.4 g 6    cetirizine (ZYRTEC) 10 MG tablet Take 1 tablet (10 mg total) by mouth once daily. 90 tablet 3    cyclobenzaprine (FLEXERIL) 10 MG tablet       famotidine (PEPCID) 20 MG tablet Take 20 mg by mouth once daily.      fluticasone (FLONASE SENSIMIST) 27.5 mcg/actuation nasal spray 2 sprays by Nasal route once daily. 9.1 mL 11    HYDROcodone-acetaminophen (NORCO) 5-325 mg per tablet Take 1 tablet by mouth every 6 (six) hours as needed.      levocetirizine (XYZAL) 5 MG tablet Take 1 tablet (5 mg total) by mouth every evening. 90 tablet 3    meloxicam (MOBIC) 15 MG tablet Take 15 mg by mouth once daily.      methocarbamoL (ROBAXIN) 500 MG Tab SMARTSI Tablet(s) By Mouth Every 12 Hours      methylPREDNISolone (MEDROL DOSEPACK) 4 mg tablet use as directed 21 each 0    mometasone (NASONEX) 50 mcg/actuation nasal spray 2 sprays by Nasal route 2 (two) times daily. 2 each 5    montelukast (SINGULAIR) 10 mg tablet Take 1 tablet (10 mg total) by mouth every evening. 30 tablet 11    naproxen (NAPROSYN) 500 MG tablet TK  1 T PO BID PRF PAIN      ondansetron (ZOFRAN-ODT) 4 MG TbDL       pneumoc 20-faheem conj-dip cr,PF, (PREVNAR 20, PF,) 0.5 mL Syrg injection Inject into the muscle. 0.5 mL 0    pregabalin (LYRICA) 75 MG capsule Take 1 capsule (75 mg total) by mouth 2 (two) times daily. 60 capsule 2    traMADoL (ULTRAM) 50 mg tablet SMARTSI Tablet(s) By Mouth Every 12 Hours PRN      aspirin (ECOTRIN) 81 MG EC tablet Take 1 tablet (81 mg total) by mouth once daily. 90 tablet 3    atorvastatin (LIPITOR) 10 MG tablet Take 1 tablet (10 mg total) by mouth every evening. 90 tablet 3    buPROPion (WELLBUTRIN SR) 150 MG TBSR 12 hr tablet Take 1 tablet (150 mg total) by mouth once daily for 3 days, THEN 1 tablet (150 mg total) 2 (two) times daily. 177 tablet 0    camphor-menthoL 0.2-3.5 % Gel Apply 1 application topically 2 (two) times daily as needed (pain). 113.4 g 3    DULoxetine (CYMBALTA) 30 MG capsule Take 1 capsule (30 mg total) by mouth once daily. 30 capsule 11     No current facility-administered medications on file prior to visit.     Review of patient's allergies indicates:   Allergen Reactions    Diphenhydramine hcl Anaphylaxis       Review of Systems   Constitutional: Negative for chills, decreased appetite, fever, malaise/fatigue, night sweats, weight gain and weight loss.   Cardiovascular:  Negative for chest pain, claudication, dyspnea on exertion, leg swelling, palpitations and syncope.   Respiratory:  Negative for cough and shortness of breath.    Endocrine: Negative for cold intolerance and heat intolerance.   Hematologic/Lymphatic: Negative for bleeding problem. Does not bruise/bleed easily.   Skin:  Negative for color change, dry skin, flushing, itching, nail changes, poor wound healing, rash, skin cancer, suspicious lesions and unusual hair distribution.   Musculoskeletal:  Negative for arthritis, back pain, falls, gout, joint pain, joint swelling, muscle cramps, muscle weakness, myalgias, neck pain and stiffness.  "  Gastrointestinal:  Negative for diarrhea, nausea and vomiting.   Neurological:  Negative for dizziness, focal weakness, light-headedness, numbness, paresthesias, tremors, vertigo and weakness.   Psychiatric/Behavioral:  Negative for altered mental status and depression. The patient does not have insomnia.    Allergic/Immunologic: Negative.            Objective:       Vitals:    08/04/23 0911   BP: 138/86   Pulse: 74   Resp: 18   Weight: 62 kg (136 lb 11 oz)   Height: 5' 3" (1.6 m)   PainSc: 0-No pain   62 kg (136 lb 11 oz)     Physical Exam  Vitals reviewed.   Constitutional:       General: She is not in acute distress.     Appearance: She is well-developed. She is not ill-appearing, toxic-appearing or diaphoretic.      Comments: Proper supportive shoegear      Cardiovascular:      Pulses:           Dorsalis pedis pulses are 2+ on the right side and 2+ on the left side.        Posterior tibial pulses are 2+ on the right side and 2+ on the left side.   Musculoskeletal:         General: No tenderness.      Right lower leg: No edema.      Left lower leg: No edema.      Right ankle: Normal.      Right Achilles Tendon: Normal.      Left ankle: Normal.      Left Achilles Tendon: Normal.      Right foot: Decreased range of motion. Deformity, bunion and prominent metatarsal heads present. No tenderness or bony tenderness.      Left foot: Decreased range of motion. Deformity, bunion and prominent metatarsal heads present. No tenderness or bony tenderness.      Comments: Strength 5/5 b/l   Feet:      Right foot:      Protective Sensation: 10 sites tested.  10 sites sensed.      Skin integrity: No ulcer, blister, skin breakdown, erythema, warmth, callus or dry skin.      Toenail Condition: Right toenails are normal.      Left foot:      Protective Sensation: 10 sites tested.  10 sites sensed.      Skin integrity: No ulcer, blister, skin breakdown, erythema, warmth, callus or dry skin.      Toenail Condition: Left toenails " are normal.      Comments: Slight decrease to digits. SWM grossly intact  Skin:     General: Skin is warm.      Capillary Refill: Capillary refill takes 2 to 3 seconds.      Coloration: Skin is not pale.      Findings: No erythema or rash.   Neurological:      Mental Status: She is alert and oriented to person, place, and time.      Sensory: No sensory deficit.      Gait: Gait is intact.   Psychiatric:         Attention and Perception: Attention normal.         Mood and Affect: Mood normal.         Speech: Speech normal.         Behavior: Behavior normal.         Thought Content: Thought content normal.         Cognition and Memory: Cognition normal.         Judgment: Judgment normal.               Assessment:       Encounter Diagnoses   Name Primary?    Type 2 diabetes mellitus without complication, without long-term current use of insulin     Encounter for diabetic foot exam Yes    Cigarette nicotine dependence without complication          Plan:       Aileen was seen today for pcp, diabetic foot exam and nail care.    Diagnoses and all orders for this visit:    Encounter for diabetic foot exam    Type 2 diabetes mellitus without complication, without long-term current use of insulin  -     Ambulatory referral/consult to Podiatry    Cigarette nicotine dependence without complication      I counseled the patient on her conditions, their implications and medical management.        - Shoe inspection. Diabetic Foot Education. Patient reminded of the importance of good nutrition and blood sugar control to help prevent podiatric complications of diabetes. Patient instructed on proper foot hygeine. We discussed wearing proper shoe gear, daily foot inspections, never walking without protective shoe gear, never putting sharp instruments to feet, routine podiatric nail visits every 2-3 months.         Reviewed A1c in educated patient she is diabetic  Recommend meeting with a dietitian if she is interested    Continue proper  shoe gear    Briefly discuss nicotine use in vasculature possible future compromise currently intact        Follow up in about 1 year (around 8/4/2024).

## 2023-08-15 NOTE — PROGRESS NOTES
"[facility] Note    Chief Complaint      Chief Complaint   Patient presents with    Annual Exam    Gastroesophageal Reflux       History of Present Illness      Aileen Alegria is a 62 y.o. female who presents today for   Chief Complaint   Patient presents with    Annual Exam    Gastroesophageal Reflux         Patient has not been taking Pepcid for gastric reflux "in quite a while".  She reports that it was refilled by her last provider prior to me.  She reports epigastric burning-type pain after eating.  Last bowel movement was last night.  She denies any nausea vomiting.  Otherwise feeling well.  No other complaints at this time.         Review of Systems   Gastrointestinal:  Positive for abdominal pain.   All 12 systems otherwise negative.       Family History:  family history includes Asthma in her daughter and son; Dementia in her father; Hypertension in her father and sister; Lung cancer in her mother; No Known Problems in her brother, maternal grandfather, maternal grandmother, paternal grandfather, and paternal grandmother; Prostate cancer in her father.   Family history was reviewed with patient.     Medications:  Outpatient Encounter Medications as of 8/17/2023   Medication Sig Dispense Refill    albuterol (ACCUNEB) 1.25 mg/3 mL Nebu USE 1 VIAL PER NEBULIZER EVERY 6 HOURS AS NEEDED FOR SHORTNESS OF BREATH OR WHEEZING 225 mL 1    albuterol (VENTOLIN HFA) 90 mcg/actuation inhaler Inhale 2 puffs into the lungs every 6 (six) hours as needed for Wheezing or Shortness of Breath. Rescue 18 g 2    budesonide-formoterol 160-4.5 mcg (SYMBICORT) 160-4.5 mcg/actuation HFAA Inhale 2 puffs into the lungs every 12 (twelve) hours. 20.4 g 6    cetirizine (ZYRTEC) 10 MG tablet Take 1 tablet (10 mg total) by mouth once daily. 90 tablet 3    cyclobenzaprine (FLEXERIL) 10 MG tablet       fluticasone (FLONASE SENSIMIST) 27.5 mcg/actuation nasal spray 2 sprays by Nasal route once daily. 9.1 mL 11    " HYDROcodone-acetaminophen (NORCO) 5-325 mg per tablet Take 1 tablet by mouth every 6 (six) hours as needed.      levocetirizine (XYZAL) 5 MG tablet Take 1 tablet (5 mg total) by mouth every evening. 90 tablet 3    methocarbamoL (ROBAXIN) 500 MG Tab SMARTSI Tablet(s) By Mouth Every 12 Hours      methylPREDNISolone (MEDROL DOSEPACK) 4 mg tablet use as directed 21 each 0    mometasone (NASONEX) 50 mcg/actuation nasal spray 2 sprays by Nasal route 2 (two) times daily. 2 each 5    montelukast (SINGULAIR) 10 mg tablet Take 1 tablet (10 mg total) by mouth every evening. 30 tablet 11    ondansetron (ZOFRAN-ODT) 4 MG TbDL       pneumoc 20-faheem conj-dip cr,PF, (PREVNAR 20, PF,) 0.5 mL Syrg injection Inject into the muscle. 0.5 mL 0    pregabalin (LYRICA) 75 MG capsule Take 1 capsule (75 mg total) by mouth 2 (two) times daily. 60 capsule 2    [DISCONTINUED] famotidine (PEPCID) 20 MG tablet Take 20 mg by mouth once daily.      aspirin (ECOTRIN) 81 MG EC tablet Take 1 tablet (81 mg total) by mouth once daily. 90 tablet 3    atorvastatin (LIPITOR) 10 MG tablet Take 1 tablet (10 mg total) by mouth every evening. 90 tablet 3    buPROPion (WELLBUTRIN SR) 150 MG TBSR 12 hr tablet Take 1 tablet (150 mg total) by mouth once daily for 3 days, THEN 1 tablet (150 mg total) 2 (two) times daily. 177 tablet 0    camphor-menthoL 0.2-3.5 % Gel Apply 1 application topically 2 (two) times daily as needed (pain). 113.4 g 3    DULoxetine (CYMBALTA) 30 MG capsule Take 1 capsule (30 mg total) by mouth once daily. 30 capsule 11    famotidine (PEPCID) 20 MG tablet Take 1 tablet (20 mg total) by mouth once daily. 90 tablet 3    meloxicam (MOBIC) 15 MG tablet Take 15 mg by mouth once daily.      naproxen (NAPROSYN) 500 MG tablet TK 1 T PO BID PRF PAIN      traMADoL (ULTRAM) 50 mg tablet SMARTSI Tablet(s) By Mouth Every 12 Hours PRN       No facility-administered encounter medications on file as of 2023.       Allergies:  Review of patient's  allergies indicates:   Allergen Reactions    Diphenhydramine hcl Anaphylaxis       Health Maintenance:  Health Maintenance   Topic Date Due    Low Dose Statin  Never done    Eye Exam  05/13/2023    Colorectal Cancer Screening  10/09/2023 (Originally 1960)    Hemoglobin A1c  11/30/2023    Mammogram  05/30/2024    Lipid Panel  05/30/2024    Foot Exam  08/04/2024    TETANUS VACCINE  02/28/2029    Hepatitis C Screening  Completed    Shingles Vaccine  Completed     Health Maintenance Topics with due status: Not Due       Topic Last Completion Date    TETANUS VACCINE 02/28/2019    Cervical Cancer Screening 06/04/2021    Diabetes Urine Screening 05/29/2023    Mammogram 05/30/2023    Lipid Panel 05/30/2023    Hemoglobin A1c 05/30/2023    Foot Exam 08/04/2023    Influenza Vaccine Not Due       Physical Exam      Vital Signs  Pulse: 78  SpO2: 98 %  BP: 139/65  Pain Score: 0-No pain  Height and Weight  Weight: 63 kg (138 lb 14.2 oz)]    Physical Exam  Constitutional:       Appearance: Normal appearance. She is normal weight.   HENT:      Head: Normocephalic and atraumatic.      Nose: Nose normal.      Mouth/Throat:      Mouth: Mucous membranes are moist.      Pharynx: Oropharynx is clear.   Eyes:      Extraocular Movements: Extraocular movements intact.      Conjunctiva/sclera: Conjunctivae normal.      Pupils: Pupils are equal, round, and reactive to light.   Cardiovascular:      Rate and Rhythm: Normal rate and regular rhythm.      Pulses: Normal pulses.      Heart sounds: Normal heart sounds.   Pulmonary:      Effort: Pulmonary effort is normal.      Breath sounds: Normal breath sounds.   Abdominal:      General: Abdomen is flat. Bowel sounds are normal.      Palpations: Abdomen is soft.      Tenderness: There is abdominal tenderness.      Comments: +  tenderness noted to epigastric region upon palpation.   Musculoskeletal:         General: Normal range of motion.      Cervical back: Normal range of motion and neck  supple.   Skin:     General: Skin is warm and dry.      Capillary Refill: Capillary refill takes less than 2 seconds.   Neurological:      General: No focal deficit present.      Mental Status: She is alert and oriented to person, place, and time. Mental status is at baseline.   Psychiatric:         Mood and Affect: Mood normal.         Behavior: Behavior normal.         Thought Content: Thought content normal.         Judgment: Judgment normal.            Assessment/Plan     Aileen Alegria is a 62 y.o.female with:    Gastroesophageal reflux disease, unspecified whether esophagitis present  -     famotidine (PEPCID) 20 MG tablet; Take 1 tablet (20 mg total) by mouth once daily.  Dispense: 90 tablet; Refill: 3    Epigastric pain  -     US Abdomen Complete; Future; Expected date: 08/17/2023        As above, continue current medications and maintain follow up with specialists.  Return to clinic as needed.    Greater than 50% of visit was spent face to face with patient.  All questions were answered to patient's satisfaction.          Karen L Spencer, NP-C [facility]

## 2023-08-17 ENCOUNTER — OFFICE VISIT (OUTPATIENT)
Dept: PRIMARY CARE CLINIC | Facility: CLINIC | Age: 63
End: 2023-08-17
Payer: MEDICARE

## 2023-08-17 VITALS
DIASTOLIC BLOOD PRESSURE: 65 MMHG | BODY MASS INDEX: 24.6 KG/M2 | WEIGHT: 138.88 LBS | OXYGEN SATURATION: 98 % | HEART RATE: 78 BPM | SYSTOLIC BLOOD PRESSURE: 139 MMHG

## 2023-08-17 DIAGNOSIS — R10.13 EPIGASTRIC PAIN: ICD-10-CM

## 2023-08-17 DIAGNOSIS — K21.9 GASTROESOPHAGEAL REFLUX DISEASE, UNSPECIFIED WHETHER ESOPHAGITIS PRESENT: Primary | ICD-10-CM

## 2023-08-17 PROCEDURE — 3075F PR MOST RECENT SYSTOLIC BLOOD PRESS GE 130-139MM HG: ICD-10-PCS | Mod: CPTII,S$GLB,, | Performed by: NURSE PRACTITIONER

## 2023-08-17 PROCEDURE — 3008F PR BODY MASS INDEX (BMI) DOCUMENTED: ICD-10-PCS | Mod: CPTII,S$GLB,, | Performed by: NURSE PRACTITIONER

## 2023-08-17 PROCEDURE — 3044F PR MOST RECENT HEMOGLOBIN A1C LEVEL <7.0%: ICD-10-PCS | Mod: CPTII,S$GLB,, | Performed by: NURSE PRACTITIONER

## 2023-08-17 PROCEDURE — 99999 PR PBB SHADOW E&M-EST. PATIENT-LVL IV: ICD-10-PCS | Mod: PBBFAC,,, | Performed by: NURSE PRACTITIONER

## 2023-08-17 PROCEDURE — 1159F MED LIST DOCD IN RCRD: CPT | Mod: CPTII,S$GLB,, | Performed by: NURSE PRACTITIONER

## 2023-08-17 PROCEDURE — 3061F PR NEG MICROALBUMINURIA RESULT DOCUMENTED/REVIEW: ICD-10-PCS | Mod: CPTII,S$GLB,, | Performed by: NURSE PRACTITIONER

## 2023-08-17 PROCEDURE — 99999 PR PBB SHADOW E&M-EST. PATIENT-LVL IV: CPT | Mod: PBBFAC,,, | Performed by: NURSE PRACTITIONER

## 2023-08-17 PROCEDURE — 1159F PR MEDICATION LIST DOCUMENTED IN MEDICAL RECORD: ICD-10-PCS | Mod: CPTII,S$GLB,, | Performed by: NURSE PRACTITIONER

## 2023-08-17 PROCEDURE — 99214 PR OFFICE/OUTPT VISIT, EST, LEVL IV, 30-39 MIN: ICD-10-PCS | Mod: S$GLB,,, | Performed by: NURSE PRACTITIONER

## 2023-08-17 PROCEDURE — 3075F SYST BP GE 130 - 139MM HG: CPT | Mod: CPTII,S$GLB,, | Performed by: NURSE PRACTITIONER

## 2023-08-17 PROCEDURE — 3008F BODY MASS INDEX DOCD: CPT | Mod: CPTII,S$GLB,, | Performed by: NURSE PRACTITIONER

## 2023-08-17 PROCEDURE — 3061F NEG MICROALBUMINURIA REV: CPT | Mod: CPTII,S$GLB,, | Performed by: NURSE PRACTITIONER

## 2023-08-17 PROCEDURE — 3066F NEPHROPATHY DOC TX: CPT | Mod: CPTII,S$GLB,, | Performed by: NURSE PRACTITIONER

## 2023-08-17 PROCEDURE — 99214 OFFICE O/P EST MOD 30 MIN: CPT | Mod: S$GLB,,, | Performed by: NURSE PRACTITIONER

## 2023-08-17 PROCEDURE — 3066F PR DOCUMENTATION OF TREATMENT FOR NEPHROPATHY: ICD-10-PCS | Mod: CPTII,S$GLB,, | Performed by: NURSE PRACTITIONER

## 2023-08-17 PROCEDURE — 3044F HG A1C LEVEL LT 7.0%: CPT | Mod: CPTII,S$GLB,, | Performed by: NURSE PRACTITIONER

## 2023-08-17 PROCEDURE — 3078F PR MOST RECENT DIASTOLIC BLOOD PRESSURE < 80 MM HG: ICD-10-PCS | Mod: CPTII,S$GLB,, | Performed by: NURSE PRACTITIONER

## 2023-08-17 PROCEDURE — 3078F DIAST BP <80 MM HG: CPT | Mod: CPTII,S$GLB,, | Performed by: NURSE PRACTITIONER

## 2023-08-17 RX ORDER — FAMOTIDINE 20 MG/1
20 TABLET, FILM COATED ORAL DAILY
Qty: 90 TABLET | Refills: 3 | Status: SHIPPED | OUTPATIENT
Start: 2023-08-17

## 2023-08-22 ENCOUNTER — HOSPITAL ENCOUNTER (OUTPATIENT)
Dept: RADIOLOGY | Facility: OTHER | Age: 63
Discharge: HOME | End: 2023-08-22
Attending: NURSE PRACTITIONER
Payer: MEDICARE

## 2023-08-22 DIAGNOSIS — R10.13 EPIGASTRIC PAIN: ICD-10-CM

## 2023-08-22 PROCEDURE — 76700 US EXAM ABDOM COMPLETE: CPT | Mod: 26,,, | Performed by: INTERNAL MEDICINE

## 2023-08-22 PROCEDURE — 76700 US ABDOMEN COMPLETE: ICD-10-PCS | Mod: 26,,, | Performed by: INTERNAL MEDICINE

## 2023-08-22 PROCEDURE — 76700 US EXAM ABDOM COMPLETE: CPT | Mod: TC

## 2023-09-28 NOTE — PROGRESS NOTES
[facility] Note    Chief Complaint      Chief Complaint   Patient presents with    Back Pain    Dental Pain       History of Present Illness      Aileen Alegria is a 62 y.o. female who presents today for   Chief Complaint   Patient presents with    Back Pain    Dental Pain         Patient reports back pain and dental pain at this visit.  She denies any trauma to her back.  She is not taken any medications to resolve symptoms.  Otherwise she is feeling well.         Review of Systems   Musculoskeletal:  Positive for back pain.   All 12 systems otherwise negative.       Family History:  family history includes Asthma in her daughter and son; Dementia in her father; Hypertension in her father and sister; Lung cancer in her mother; No Known Problems in her brother, maternal grandfather, maternal grandmother, paternal grandfather, and paternal grandmother; Prostate cancer in her father.   Family history was reviewed with patient.     Medications:  Outpatient Encounter Medications as of 9/29/2023   Medication Sig Dispense Refill    albuterol (ACCUNEB) 1.25 mg/3 mL Nebu USE 1 VIAL PER NEBULIZER EVERY 6 HOURS AS NEEDED FOR SHORTNESS OF BREATH OR WHEEZING 225 mL 1    albuterol (VENTOLIN HFA) 90 mcg/actuation inhaler Inhale 2 puffs into the lungs every 6 (six) hours as needed for Wheezing or Shortness of Breath. Rescue 18 g 2    aspirin (ECOTRIN) 81 MG EC tablet Take 1 tablet (81 mg total) by mouth once daily. 90 tablet 3    budesonide-formoterol 160-4.5 mcg (SYMBICORT) 160-4.5 mcg/actuation HFAA Inhale 2 puffs into the lungs every 12 (twelve) hours. 20.4 g 6    buPROPion (WELLBUTRIN SR) 150 MG TBSR 12 hr tablet Take 1 tablet (150 mg total) by mouth once daily for 3 days, THEN 1 tablet (150 mg total) 2 (two) times daily. 177 tablet 0    cetirizine (ZYRTEC) 10 MG tablet Take 1 tablet (10 mg total) by mouth once daily. 90 tablet 3    cyclobenzaprine (FLEXERIL) 10 MG tablet       famotidine (PEPCID) 20 MG tablet  Take 1 tablet (20 mg total) by mouth once daily. 90 tablet 3    fluticasone (FLONASE SENSIMIST) 27.5 mcg/actuation nasal spray 2 sprays by Nasal route once daily. 9.1 mL 11    HYDROcodone-acetaminophen (NORCO) 5-325 mg per tablet Take 1 tablet by mouth every 6 (six) hours as needed.      levocetirizine (XYZAL) 5 MG tablet Take 1 tablet (5 mg total) by mouth every evening. 90 tablet 3    meloxicam (MOBIC) 15 MG tablet Take 15 mg by mouth once daily.      methocarbamoL (ROBAXIN) 500 MG Tab SMARTSI Tablet(s) By Mouth Every 12 Hours      methylPREDNISolone (MEDROL DOSEPACK) 4 mg tablet use as directed 21 each 0    mometasone (NASONEX) 50 mcg/actuation nasal spray 2 sprays by Nasal route 2 (two) times daily. 2 each 5    montelukast (SINGULAIR) 10 mg tablet Take 1 tablet (10 mg total) by mouth every evening. 30 tablet 11    naproxen (NAPROSYN) 500 MG tablet TK 1 T PO BID PRF PAIN      ondansetron (ZOFRAN-ODT) 4 MG TbDL       pregabalin (LYRICA) 75 MG capsule Take 1 capsule (75 mg total) by mouth 2 (two) times daily. 60 capsule 2    amoxicillin (AMOXIL) 500 MG Tab Take 1 tablet (500 mg total) by mouth every 12 (twelve) hours. for 10 days 14 tablet 0    atorvastatin (LIPITOR) 10 MG tablet Take 1 tablet (10 mg total) by mouth every evening. 90 tablet 3    camphor-menthoL 0.2-3.5 % Gel Apply 1 application topically 2 (two) times daily as needed (pain). 113.4 g 3    DULoxetine (CYMBALTA) 30 MG capsule Take 1 capsule (30 mg total) by mouth once daily. 30 capsule 11    pneumoc 20-faheem conj-dip cr,PF, (PREVNAR 20, PF,) 0.5 mL Syrg injection Inject into the muscle. 0.5 mL 0    traMADoL (ULTRAM) 50 mg tablet SMARTSI Tablet(s) By Mouth Every 12 Hours PRN       No facility-administered encounter medications on file as of 2023.       Allergies:  Review of patient's allergies indicates:   Allergen Reactions    Diphenhydramine hcl Anaphylaxis       Health Maintenance:  Health Maintenance   Topic Date Due    Low Dose Statin   "Never done    Eye Exam  05/13/2023    Colorectal Cancer Screening  10/09/2023 (Originally 1960)    Hemoglobin A1c  11/30/2023    Mammogram  05/30/2024    Lipid Panel  05/30/2024    Foot Exam  08/04/2024    TETANUS VACCINE  02/28/2029    Hepatitis C Screening  Completed    Shingles Vaccine  Completed     Health Maintenance Topics with due status: Not Due       Topic Last Completion Date    TETANUS VACCINE 02/28/2019    Cervical Cancer Screening 06/04/2021    Diabetes Urine Screening 05/29/2023    Mammogram 05/30/2023    Lipid Panel 05/30/2023    Hemoglobin A1c 05/30/2023    Foot Exam 08/04/2023       Physical Exam      Vital Signs  Temp: 98 °F (36.7 °C)  Temp Source: Oral  Pulse: 82  Resp: 20  SpO2: 99 %  BP: 134/72  BP Location: Left arm  Patient Position: Sitting  Pain Score: 10-Worst pain ever  Pain Loc: Back  Height and Weight  Height: 5' 3" (160 cm)  Weight: 64.5 kg (142 lb 3.2 oz)  BSA (Calculated - sq m): 1.69 sq meters  BMI (Calculated): 25.2  Weight in (lb) to have BMI = 25: 140.8]    Physical Exam  Vitals reviewed.   Constitutional:       Appearance: Normal appearance. She is normal weight.   HENT:      Head: Normocephalic and atraumatic.      Nose: Nose normal.      Mouth/Throat:      Mouth: Mucous membranes are moist.      Pharynx: Oropharynx is clear.   Eyes:      Extraocular Movements: Extraocular movements intact.      Conjunctiva/sclera: Conjunctivae normal.      Pupils: Pupils are equal, round, and reactive to light.   Cardiovascular:      Rate and Rhythm: Normal rate and regular rhythm.      Pulses: Normal pulses.      Heart sounds: Normal heart sounds.   Pulmonary:      Effort: Pulmonary effort is normal.      Breath sounds: Normal breath sounds.   Musculoskeletal:         General: Normal range of motion.      Cervical back: Normal range of motion and neck supple.   Skin:     General: Skin is warm and dry.      Capillary Refill: Capillary refill takes less than 2 seconds.   Neurological:      " General: No focal deficit present.      Mental Status: She is alert and oriented to person, place, and time. Mental status is at baseline.   Psychiatric:         Mood and Affect: Mood normal.         Behavior: Behavior normal.         Thought Content: Thought content normal.         Judgment: Judgment normal.            Assessment/Plan     Aileen Alegria is a 62 y.o.female with:    Acute midline low back pain without sciatica  -     X-Ray Lumbar Spine AP And Lateral; Future; Expected date: 09/29/2023  -     Ambulatory referral/consult to [facility] Spine Clinic; Future; Expected date: 10/06/2023    Dental abscess  -     amoxicillin (AMOXIL) 500 MG Tab; Take 1 tablet (500 mg total) by mouth every 12 (twelve) hours. for 10 days  Dispense: 14 tablet; Refill: 0        As above, continue current medications and maintain follow up with specialists.  Return to clinic as needed.    Greater than 50% of visit was spent face to face with patient.  All questions were answered to patient's satisfaction.          Karen L Spencer, NP-C [facility]

## 2023-09-29 ENCOUNTER — HOSPITAL ENCOUNTER (OUTPATIENT)
Dept: RADIOLOGY | Facility: HOSPITAL | Age: 63
Discharge: HOME | End: 2023-09-29
Attending: NURSE PRACTITIONER
Payer: MEDICARE

## 2023-09-29 ENCOUNTER — TELEPHONE (OUTPATIENT)
Dept: PAIN MEDICINE | Facility: CLINIC | Age: 63
End: 2023-09-29
Payer: MEDICARE

## 2023-09-29 ENCOUNTER — OFFICE VISIT (OUTPATIENT)
Dept: PRIMARY CARE CLINIC | Facility: CLINIC | Age: 63
End: 2023-09-29
Payer: MEDICARE

## 2023-09-29 VITALS
HEART RATE: 82 BPM | WEIGHT: 142.19 LBS | BODY MASS INDEX: 25.2 KG/M2 | TEMPERATURE: 98 F | DIASTOLIC BLOOD PRESSURE: 72 MMHG | RESPIRATION RATE: 20 BRPM | HEIGHT: 63 IN | OXYGEN SATURATION: 99 % | SYSTOLIC BLOOD PRESSURE: 134 MMHG

## 2023-09-29 DIAGNOSIS — M54.50 ACUTE MIDLINE LOW BACK PAIN WITHOUT SCIATICA: Primary | ICD-10-CM

## 2023-09-29 DIAGNOSIS — M54.50 ACUTE MIDLINE LOW BACK PAIN WITHOUT SCIATICA: ICD-10-CM

## 2023-09-29 DIAGNOSIS — K04.7 DENTAL ABSCESS: ICD-10-CM

## 2023-09-29 PROCEDURE — 3075F PR MOST RECENT SYSTOLIC BLOOD PRESS GE 130-139MM HG: ICD-10-PCS | Mod: CPTII,S$GLB,, | Performed by: NURSE PRACTITIONER

## 2023-09-29 PROCEDURE — 1159F PR MEDICATION LIST DOCUMENTED IN MEDICAL RECORD: ICD-10-PCS | Mod: CPTII,S$GLB,, | Performed by: NURSE PRACTITIONER

## 2023-09-29 PROCEDURE — 3075F SYST BP GE 130 - 139MM HG: CPT | Mod: CPTII,S$GLB,, | Performed by: NURSE PRACTITIONER

## 2023-09-29 PROCEDURE — 3078F DIAST BP <80 MM HG: CPT | Mod: CPTII,S$GLB,, | Performed by: NURSE PRACTITIONER

## 2023-09-29 PROCEDURE — 3044F PR MOST RECENT HEMOGLOBIN A1C LEVEL <7.0%: ICD-10-PCS | Mod: CPTII,S$GLB,, | Performed by: NURSE PRACTITIONER

## 2023-09-29 PROCEDURE — 72100 X-RAY EXAM L-S SPINE 2/3 VWS: CPT | Mod: 26,,, | Performed by: STUDENT IN AN ORGANIZED HEALTH CARE EDUCATION/TRAINING PROGRAM

## 2023-09-29 PROCEDURE — 99214 OFFICE O/P EST MOD 30 MIN: CPT | Mod: S$GLB,,, | Performed by: NURSE PRACTITIONER

## 2023-09-29 PROCEDURE — 72100 X-RAY EXAM L-S SPINE 2/3 VWS: CPT | Mod: TC,PN

## 2023-09-29 PROCEDURE — 3066F NEPHROPATHY DOC TX: CPT | Mod: CPTII,S$GLB,, | Performed by: NURSE PRACTITIONER

## 2023-09-29 PROCEDURE — 3078F PR MOST RECENT DIASTOLIC BLOOD PRESSURE < 80 MM HG: ICD-10-PCS | Mod: CPTII,S$GLB,, | Performed by: NURSE PRACTITIONER

## 2023-09-29 PROCEDURE — 3008F BODY MASS INDEX DOCD: CPT | Mod: CPTII,S$GLB,, | Performed by: NURSE PRACTITIONER

## 2023-09-29 PROCEDURE — 1160F PR REVIEW ALL MEDS BY PRESCRIBER/CLIN PHARMACIST DOCUMENTED: ICD-10-PCS | Mod: CPTII,S$GLB,, | Performed by: NURSE PRACTITIONER

## 2023-09-29 PROCEDURE — 1160F RVW MEDS BY RX/DR IN RCRD: CPT | Mod: CPTII,S$GLB,, | Performed by: NURSE PRACTITIONER

## 2023-09-29 PROCEDURE — 99999 PR PBB SHADOW E&M-EST. PATIENT-LVL V: CPT | Mod: PBBFAC,,, | Performed by: NURSE PRACTITIONER

## 2023-09-29 PROCEDURE — 99214 PR OFFICE/OUTPT VISIT, EST, LEVL IV, 30-39 MIN: ICD-10-PCS | Mod: S$GLB,,, | Performed by: NURSE PRACTITIONER

## 2023-09-29 PROCEDURE — 99999 PR PBB SHADOW E&M-EST. PATIENT-LVL V: ICD-10-PCS | Mod: PBBFAC,,, | Performed by: NURSE PRACTITIONER

## 2023-09-29 PROCEDURE — 3066F PR DOCUMENTATION OF TREATMENT FOR NEPHROPATHY: ICD-10-PCS | Mod: CPTII,S$GLB,, | Performed by: NURSE PRACTITIONER

## 2023-09-29 PROCEDURE — 3044F HG A1C LEVEL LT 7.0%: CPT | Mod: CPTII,S$GLB,, | Performed by: NURSE PRACTITIONER

## 2023-09-29 PROCEDURE — 3061F NEG MICROALBUMINURIA REV: CPT | Mod: CPTII,S$GLB,, | Performed by: NURSE PRACTITIONER

## 2023-09-29 PROCEDURE — 1159F MED LIST DOCD IN RCRD: CPT | Mod: CPTII,S$GLB,, | Performed by: NURSE PRACTITIONER

## 2023-09-29 PROCEDURE — 72100 XR LUMBAR SPINE AP AND LATERAL: ICD-10-PCS | Mod: 26,,, | Performed by: STUDENT IN AN ORGANIZED HEALTH CARE EDUCATION/TRAINING PROGRAM

## 2023-09-29 PROCEDURE — 3061F PR NEG MICROALBUMINURIA RESULT DOCUMENTED/REVIEW: ICD-10-PCS | Mod: CPTII,S$GLB,, | Performed by: NURSE PRACTITIONER

## 2023-09-29 PROCEDURE — 3008F PR BODY MASS INDEX (BMI) DOCUMENTED: ICD-10-PCS | Mod: CPTII,S$GLB,, | Performed by: NURSE PRACTITIONER

## 2023-09-29 RX ORDER — AMOXICILLIN 500 MG/1
500 TABLET, FILM COATED ORAL EVERY 12 HOURS
Qty: 14 TABLET | Refills: 0 | Status: SHIPPED | OUTPATIENT
Start: 2023-09-29 | End: 2023-10-09

## 2023-09-29 NOTE — TELEPHONE ENCOUNTER
----- Message from Fatimah Chen sent at 9/29/2023  9:08 AM CDT -----  NP Prabha Mancilla has put in a referral for Consult to Back and Spine. Please assist in scheduling.    Acute midline low back pain without sciatica [M54.50]    Thanks

## 2023-09-29 NOTE — LETTER
September 29, 2023    Aileen Alegria  [address] [place] [address]         [facility]  [address]  [place] [address]  Phone: [phone]  Fax: [phone] September 29, 2023     Patient: Aileen Alegria   YOB: 1960   Date of Visit: 9/29/2023       To Whom It May Concern:    Ms. Alegria was seen by me in clinic today.     It is my medical opinion that Aileen Alegria may return to work on 10/05/23 .    If you have any questions or concerns, please don't hesitate to call.    Sincerely,        Prabha Mancilla, NP

## 2023-10-09 ENCOUNTER — TELEPHONE (OUTPATIENT)
Dept: ENDOSCOPY | Facility: HOSPITAL | Age: 63
End: 2023-10-09

## 2023-10-09 ENCOUNTER — CLINICAL SUPPORT (OUTPATIENT)
Dept: ENDOSCOPY | Facility: HOSPITAL | Age: 63
End: 2023-10-09
Payer: MEDICARE

## 2023-10-09 DIAGNOSIS — Z12.11 COLON CANCER SCREENING: ICD-10-CM

## 2023-10-09 NOTE — TELEPHONE ENCOUNTER
Spoke to patient to schedule procedure(s) Colonoscopy       Physician to perform procedure(s) Dr. CONNER Agarwal  Date of Procedure (s) 1/3/24  Arrival Time 12:45 PM  Time of Procedure(s) 1:45 PM   Location of Procedure(s) [place] 4th Floor  Type of Rx Prep sent to patient: PEG  Instructions provided to patient via [facility]    Patient was informed on the following information and verbalized understanding. Screening questionnaire reviewed with patient and complete. If procedure requires anesthesia, a responsible adult needs to be present to accompany the patient home, patient cannot drive after receiving anesthesia. Appointment details are tentative, especially check-in time. Patient will receive a prep-op call 4 days prior to confirm check-in time for procedure. If applicable the patient should contact their pharmacy to verify Rx for procedure prep is ready for pick-up. Patient was advised to call the scheduling department at [phone] if pharmacy states no Rx is available. Patient was advised to call the endoscopy scheduling department if any questions or concerns arise.      [facility] Scheduling Department

## 2023-10-09 NOTE — PLAN OF CARE
Spoke to patient to schedule procedure(s) Colonoscopy       Physician to perform procedure(s) Dr. CONNER Agarwal  Date of Procedure (s) 1/3/24  Arrival Time 12:45 PM  Time of Procedure(s) 1:45 PM   Location of Procedure(s) [place] 4th Floor  Type of Rx Prep sent to patient: PEG  Instructions provided to patient via [facility]    Patient was informed on the following information and verbalized understanding. Screening questionnaire reviewed with patient and complete. If procedure requires anesthesia, a responsible adult needs to be present to accompany the patient home, patient cannot drive after receiving anesthesia. Appointment details are tentative, especially check-in time. Patient will receive a prep-op call 4 days prior to confirm check-in time for procedure. If applicable the patient should contact their pharmacy to verify Rx for procedure prep is ready for pick-up. Patient was advised to call the scheduling department at [phone] if pharmacy states no Rx is available. Patient was advised to call the endoscopy scheduling department if any questions or concerns arise.      [facility] Scheduling Department

## 2023-10-26 ENCOUNTER — OFFICE VISIT (OUTPATIENT)
Dept: OPTOMETRY | Facility: CLINIC | Age: 63
End: 2023-10-26
Payer: MEDICARE

## 2023-10-26 DIAGNOSIS — E11.9 TYPE 2 DIABETES MELLITUS WITHOUT COMPLICATION, WITHOUT LONG-TERM CURRENT USE OF INSULIN: Primary | ICD-10-CM

## 2023-10-26 DIAGNOSIS — H52.13 MYOPIA WITH PRESBYOPIA OF BOTH EYES: ICD-10-CM

## 2023-10-26 DIAGNOSIS — H52.4 MYOPIA WITH PRESBYOPIA OF BOTH EYES: ICD-10-CM

## 2023-10-26 DIAGNOSIS — H25.13 NUCLEAR SCLEROSIS, BILATERAL: ICD-10-CM

## 2023-10-26 PROCEDURE — 92015 DETERMINE REFRACTIVE STATE: CPT | Mod: S$GLB,,, | Performed by: OPTOMETRIST

## 2023-10-26 PROCEDURE — 92015 PR REFRACTION: ICD-10-PCS | Mod: S$GLB,,, | Performed by: OPTOMETRIST

## 2023-10-26 PROCEDURE — 2023F DILAT RTA XM W/O RTNOPTHY: CPT | Mod: CPTII,S$GLB,, | Performed by: OPTOMETRIST

## 2023-10-26 PROCEDURE — 3061F NEG MICROALBUMINURIA REV: CPT | Mod: CPTII,S$GLB,, | Performed by: OPTOMETRIST

## 2023-10-26 PROCEDURE — 92004 COMPRE OPH EXAM NEW PT 1/>: CPT | Mod: S$GLB,,, | Performed by: OPTOMETRIST

## 2023-10-26 PROCEDURE — 3061F PR NEG MICROALBUMINURIA RESULT DOCUMENTED/REVIEW: ICD-10-PCS | Mod: CPTII,S$GLB,, | Performed by: OPTOMETRIST

## 2023-10-26 PROCEDURE — 2023F PR DILATED RETINAL EXAM W/O EVID OF RETINOPATHY: ICD-10-PCS | Mod: CPTII,S$GLB,, | Performed by: OPTOMETRIST

## 2023-10-26 PROCEDURE — 99999 PR PBB SHADOW E&M-EST. PATIENT-LVL III: ICD-10-PCS | Mod: PBBFAC,,, | Performed by: OPTOMETRIST

## 2023-10-26 PROCEDURE — 3066F NEPHROPATHY DOC TX: CPT | Mod: CPTII,S$GLB,, | Performed by: OPTOMETRIST

## 2023-10-26 PROCEDURE — 3044F HG A1C LEVEL LT 7.0%: CPT | Mod: CPTII,S$GLB,, | Performed by: OPTOMETRIST

## 2023-10-26 PROCEDURE — 3044F PR MOST RECENT HEMOGLOBIN A1C LEVEL <7.0%: ICD-10-PCS | Mod: CPTII,S$GLB,, | Performed by: OPTOMETRIST

## 2023-10-26 PROCEDURE — 1159F MED LIST DOCD IN RCRD: CPT | Mod: CPTII,S$GLB,, | Performed by: OPTOMETRIST

## 2023-10-26 PROCEDURE — 3066F PR DOCUMENTATION OF TREATMENT FOR NEPHROPATHY: ICD-10-PCS | Mod: CPTII,S$GLB,, | Performed by: OPTOMETRIST

## 2023-10-26 PROCEDURE — 92004 PR EYE EXAM, NEW PATIENT,COMPREHESV: ICD-10-PCS | Mod: S$GLB,,, | Performed by: OPTOMETRIST

## 2023-10-26 PROCEDURE — 99999 PR PBB SHADOW E&M-EST. PATIENT-LVL III: CPT | Mod: PBBFAC,,, | Performed by: OPTOMETRIST

## 2023-10-26 PROCEDURE — 1159F PR MEDICATION LIST DOCUMENTED IN MEDICAL RECORD: ICD-10-PCS | Mod: CPTII,S$GLB,, | Performed by: OPTOMETRIST

## 2023-10-26 NOTE — PROGRESS NOTES
HPI    Annual Diabetic Eye Exam, first [facility]   Pt states vision could be better   Pt had PAL's lost about 2 weeks ago     Pt denies F/F   Pt reports Dry/ Itchy/ Photophobia   Gtt: No     DM Dx'ed   BS: Unknown   Hemoglobin A1C       Date                     Value               Ref Range             Status                05/30/2023               6.7 (H)             4.0 - 5.6 %           Final                Last edited by Richard Fitzpatrick, OD on 10/26/2023  9:13 AM.            Assessment /Plan     For exam results, see Encounter Report.    Type 2 diabetes mellitus without complication, without long-term current use of insulin  -     Ambulatory referral/consult to Ophthalmology  -No retinopathy noted today.  Continued control with primary care physician and annual comprehensive eye exam.    Nuclear sclerosis, bilateral  -Educated patient on presence of cataracts at today's exam, monitor at annual dilated fundus exam. 8+ years surgical estimate.    Myopia with presbyopia of both eyes  Eyeglass Final Rx       Eyeglass Final Rx         Sphere Cylinder Dist VA Add    Right -0.75 Sphere 20/20 +2.25    Left -0.75 Sphere 20/20 +2.25      Type: PAL    Expiration Date: 10/26/2024                      RTC 1 yr

## 2023-12-27 ENCOUNTER — PATIENT MESSAGE (OUTPATIENT)
Dept: ENDOSCOPY | Facility: HOSPITAL | Age: 63
End: 2023-12-27
Payer: MEDICARE

## 2024-01-03 ENCOUNTER — ANESTHESIA (OUTPATIENT)
Dept: ENDOSCOPY | Facility: HOSPITAL | Age: 64
End: 2024-01-03
Payer: MEDICARE

## 2024-01-03 ENCOUNTER — ANESTHESIA EVENT (OUTPATIENT)
Dept: ENDOSCOPY | Facility: HOSPITAL | Age: 64
End: 2024-01-03
Payer: MEDICARE

## 2024-01-03 ENCOUNTER — HOSPITAL ENCOUNTER (OUTPATIENT)
Facility: HOSPITAL | Age: 64
Discharge: HOME | End: 2024-01-03
Attending: INTERNAL MEDICINE | Admitting: INTERNAL MEDICINE
Payer: MEDICARE

## 2024-01-03 VITALS
RESPIRATION RATE: 20 BRPM | TEMPERATURE: 98 F | OXYGEN SATURATION: 100 % | HEART RATE: 77 BPM | SYSTOLIC BLOOD PRESSURE: 129 MMHG | HEIGHT: 64 IN | WEIGHT: 135 LBS | BODY MASS INDEX: 23.05 KG/M2 | DIASTOLIC BLOOD PRESSURE: 70 MMHG

## 2024-01-03 DIAGNOSIS — Z12.11 SCREENING FOR COLON CANCER: ICD-10-CM

## 2024-01-03 PROCEDURE — 25000003 PHARM REV CODE 250: Performed by: NURSE ANESTHETIST, CERTIFIED REGISTERED

## 2024-01-03 PROCEDURE — 37000008 HC ANESTHESIA 1ST 15 MINUTES: Performed by: INTERNAL MEDICINE

## 2024-01-03 PROCEDURE — 27201012 HC FORCEPS, HOT/COLD, DISP: Performed by: INTERNAL MEDICINE

## 2024-01-03 PROCEDURE — E9220 PRA ENDO ANESTHESIA: HCPCS | Mod: PT,,, | Performed by: NURSE ANESTHETIST, CERTIFIED REGISTERED

## 2024-01-03 PROCEDURE — 45380 COLONOSCOPY AND BIOPSY: CPT | Mod: PT,,, | Performed by: INTERNAL MEDICINE

## 2024-01-03 PROCEDURE — 25000003 PHARM REV CODE 250: Performed by: INTERNAL MEDICINE

## 2024-01-03 PROCEDURE — 88305 TISSUE EXAM BY PATHOLOGIST: CPT | Mod: 26,,, | Performed by: STUDENT IN AN ORGANIZED HEALTH CARE EDUCATION/TRAINING PROGRAM

## 2024-01-03 PROCEDURE — 37000009 HC ANESTHESIA EA ADD 15 MINS: Performed by: INTERNAL MEDICINE

## 2024-01-03 PROCEDURE — 63600175 PHARM REV CODE 636 W HCPCS: Performed by: NURSE ANESTHETIST, CERTIFIED REGISTERED

## 2024-01-03 PROCEDURE — 45380 COLONOSCOPY AND BIOPSY: CPT | Mod: PT | Performed by: INTERNAL MEDICINE

## 2024-01-03 PROCEDURE — 88305 TISSUE EXAM BY PATHOLOGIST: CPT | Performed by: STUDENT IN AN ORGANIZED HEALTH CARE EDUCATION/TRAINING PROGRAM

## 2024-01-03 RX ORDER — LIDOCAINE HYDROCHLORIDE 20 MG/ML
INJECTION INTRAVENOUS
Status: DISCONTINUED | OUTPATIENT
Start: 2024-01-03 | End: 2024-01-03

## 2024-01-03 RX ORDER — PROPOFOL 10 MG/ML
VIAL (ML) INTRAVENOUS
Status: DISCONTINUED | OUTPATIENT
Start: 2024-01-03 | End: 2024-01-03

## 2024-01-03 RX ORDER — SODIUM CHLORIDE 0.9 % (FLUSH) 0.9 %
10 SYRINGE (ML) INJECTION
Status: DISCONTINUED | OUTPATIENT
Start: 2024-01-03 | End: 2024-01-03 | Stop reason: HOSPADM

## 2024-01-03 RX ORDER — SODIUM CHLORIDE 9 MG/ML
INJECTION, SOLUTION INTRAVENOUS CONTINUOUS
Status: DISCONTINUED | OUTPATIENT
Start: 2024-01-03 | End: 2024-01-03 | Stop reason: HOSPADM

## 2024-01-03 RX ADMIN — PROPOFOL 70 MG: 10 INJECTION, EMULSION INTRAVENOUS at 01:01

## 2024-01-03 RX ADMIN — SODIUM CHLORIDE: 0.9 INJECTION, SOLUTION INTRAVENOUS at 01:01

## 2024-01-03 RX ADMIN — LIDOCAINE HYDROCHLORIDE 100 MG: 20 INJECTION INTRAVENOUS at 01:01

## 2024-01-03 NOTE — TRANSFER OF CARE
"Anesthesia Transfer of Care Note    Patient: Aileen Alegria    Procedure(s) Performed: Procedure(s) (LRB):  COLONOSCOPY (N/A)    Patient location: GI    Anesthesia Type: general    Transport from OR: Transported from OR on room air with adequate spontaneous ventilation    Post pain: adequate analgesia    Post assessment: no apparent anesthetic complications and tolerated procedure well    Post vital signs: stable    Level of consciousness: awake and alert    Nausea/Vomiting: no nausea/vomiting    Complications: none    Transfer of care protocol was followed      Last vitals: Visit Vitals  BP (!) 159/94 (BP Location: Left arm, Patient Position: Lying)   Pulse 78   Temp 36.5 °C (97.7 °F) (Temporal)   Resp 14   Ht 5' 4" (1.626 m)   Wt 61.2 kg (135 lb)   SpO2 98%   Breastfeeding No   BMI 23.17 kg/m²     "

## 2024-01-03 NOTE — H&P
Short Stay Endoscopy History and Physical    PCP - Prabha Mancilla NP  Referring Physician - Prabha Mancilla, NP  [address] [place] [address]    Procedure - colonoscopy  ASA - per anesthesia  Mallampati - per anesthesia  History of Anesthesia problems - no  Family history Anesthesia problems -  no   Plan of anesthesia - General    HPI:  This is a 63 y.o. female here for evaluation of: screening    Reflux - no  Dysphagia - no  Abdominal pain - no  Diarrhea - no    ROS:  Constitutional: No fevers, chills, No weight loss  CV: No chest pain  Pulm: No cough, No shortness of breath  GI: see HPI    Medical History:  has a past medical history of Allergy, Environmental and seasonal allergies, and Moderate persistent asthma without complication (2022).    Surgical History:  has a past surgical history that includes  section and Neck surgery (2020).    Family History: family history includes Asthma in her daughter and son; Dementia in her father; Hypertension in her father and sister; Lung cancer in her mother; No Known Problems in her brother, maternal grandfather, maternal grandmother, paternal grandfather, and paternal grandmother; Prostate cancer in her father..    Social History:  reports that she has been smoking cigarettes. She has never used smokeless tobacco. She reports current alcohol use. She reports that she does not use drugs.    Review of patient's allergies indicates:   Allergen Reactions    Diphenhydramine hcl Anaphylaxis       Medications:   Medications Prior to Admission   Medication Sig Dispense Refill Last Dose    albuterol (ACCUNEB) 1.25 mg/3 mL Nebu USE 1 VIAL PER NEBULIZER EVERY 6 HOURS AS NEEDED FOR SHORTNESS OF BREATH OR WHEEZING 225 mL 1 Past Week    albuterol (VENTOLIN HFA) 90 mcg/actuation inhaler Inhale 2 puffs into the lungs every 6 (six) hours as needed for Wheezing or Shortness of Breath. Rescue 18 g 2 Past Week    budesonide-formoterol 160-4.5 mcg  (SYMBICORT) 160-4.5 mcg/actuation HFAA Inhale 2 puffs into the lungs every 12 (twelve) hours. 20.4 g 6 Past Week    cetirizine (ZYRTEC) 10 MG tablet Take 1 tablet (10 mg total) by mouth once daily. 90 tablet 3 2024    cyclobenzaprine (FLEXERIL) 10 MG tablet    Past Month    levocetirizine (XYZAL) 5 MG tablet Take 1 tablet (5 mg total) by mouth every evening. 90 tablet 3 2024    montelukast (SINGULAIR) 10 mg tablet Take 1 tablet (10 mg total) by mouth every evening. 30 tablet 11 2024    naproxen (NAPROSYN) 500 MG tablet TK 1 T PO BID PRF PAIN   2024    aspirin (ECOTRIN) 81 MG EC tablet Take 1 tablet (81 mg total) by mouth once daily. 90 tablet 3     atorvastatin (LIPITOR) 10 MG tablet Take 1 tablet (10 mg total) by mouth every evening. 90 tablet 3     buPROPion (WELLBUTRIN SR) 150 MG TBSR 12 hr tablet Take 1 tablet (150 mg total) by mouth once daily for 3 days, THEN 1 tablet (150 mg total) 2 (two) times daily. 177 tablet 0     camphor-menthoL 0.2-3.5 % Gel Apply 1 application topically 2 (two) times daily as needed (pain). 113.4 g 3     DULoxetine (CYMBALTA) 30 MG capsule Take 1 capsule (30 mg total) by mouth once daily. 30 capsule 11     famotidine (PEPCID) 20 MG tablet Take 1 tablet (20 mg total) by mouth once daily. 90 tablet 3 More than a month    fluticasone (FLONASE SENSIMIST) 27.5 mcg/actuation nasal spray 2 sprays by Nasal route once daily. 9.1 mL 11 More than a month    HYDROcodone-acetaminophen (NORCO) 5-325 mg per tablet Take 1 tablet by mouth every 6 (six) hours as needed.   More than a month    meloxicam (MOBIC) 15 MG tablet Take 15 mg by mouth once daily.   More than a month    methocarbamoL (ROBAXIN) 500 MG Tab SMARTSI Tablet(s) By Mouth Every 12 Hours   More than a month    methylPREDNISolone (MEDROL DOSEPACK) 4 mg tablet use as directed 21 each 0 More than a month    mometasone (NASONEX) 50 mcg/actuation nasal spray 2 sprays by Nasal route 2 (two) times daily. 2 each 5 More than a  month    ondansetron (ZOFRAN-ODT) 4 MG TbDL    More than a month    pneumoc 20-faheem conj-dip cr,PF, (PREVNAR 20, PF,) 0.5 mL Syrg injection Inject into the muscle. 0.5 mL 0 More than a month    pregabalin (LYRICA) 75 MG capsule Take 1 capsule (75 mg total) by mouth 2 (two) times daily. 60 capsule 2 More than a month    traMADoL (ULTRAM) 50 mg tablet SMARTSI Tablet(s) By Mouth Every 12 Hours PRN   More than a month       Physical Exam:    Vital Signs:   Vitals:    24 1311   BP: (!) 159/94   Pulse: 78   Resp: 14   Temp: 97.7 °F (36.5 °C)       General Appearance: Well appearing in no acute distress  Lungs: no labored breathing  CVS:  regular rate  Abdomen: non tender    Labs:  Lab Results   Component Value Date    WBC 8.27 2021    HGB 12.9 2021    HCT 39.6 2021     2021    CHOL 190 2023    TRIG 65 2023    HDL 45 2023    ALT 9 (L) 2022    AST 12 2022     2022    K 3.9 2022     2022    CREATININE 0.9 2023    BUN 8 2022    CO2 27 2022    TSH 1.240 2023    HGBA1C 6.7 (H) 2023       I have explained the risks and benefits of this endoscopic procedure to the patient including but not limited to bleeding, inflammation, infection, perforation, and death.      Ike Agarwal MD

## 2024-01-03 NOTE — [employer] PATIENT INSTRUCTIONS
Discharge Summary/Instructions after an Endoscopic Procedure  Patient Name: Aileen Alegria  Patient MRN: [id number]  Patient YOB: 1960  Wednesday, January 3, 2024  Ike Agarwal MD  Dear patient,  As a result of recent federal legislation (The Federal Cures Act), you may   receive lab or pathology results from your procedure in your [facility]   account before your physician is able to contact you. Your physician or   their representative will relay the results to you with their   recommendations at their soonest availability.  Thank you,  RESTRICTIONS:  During your procedure today, you received medications for sedation.  These   medications may affect your judgment, balance and coordination.  Therefore,   for 24 hours, you have the following restrictions:   - DO NOT drive a car, operate machinery, make legal/financial decisions,   sign important papers or drink alcohol.    ACTIVITY:  Today: no heavy lifting, straining or running due to procedural   sedation/anesthesia.  The following day: return to full activity including work.  DIET:  Eat and drink normally unless instructed otherwise.     TREATMENT FOR COMMON SIDE EFFECTS:  - Mild abdominal pain, nausea, belching, bloating or excessive gas:  rest,   eat lightly and use a heating pad.  - Sore Throat: treat with throat lozenges and/or gargle with warm salt   water.  - Because air was used during the procedure, expelling large amounts of air   from your rectum or belching is normal.  - If a bowel prep was taken, you may not have a bowel movement for 1-3 days.    This is normal.  SYMPTOMS TO WATCH FOR AND REPORT TO YOUR PHYSICIAN:  1. Abdominal pain or bloating, other than gas cramps.  2. Chest pain.  3. Back pain.  4. Signs of infection such as: chills or fever occurring within 24 hours   after the procedure.  5. Rectal bleeding, which would show as bright red, maroon, or black stools.   (A tablespoon of blood from the rectum is not serious, especially if    hemorrhoids are present.)  6. Vomiting.  7. Weakness or dizziness.  GO DIRECTLY TO THE NEAREST EMERGENCY ROOM IF YOU HAVE ANY OF THE FOLLOWING:      Difficulty breathing              Chills and/or fever over 101 F   Persistent vomiting and/or vomiting blood   Severe abdominal pain   Severe chest pain   Black, tarry stools   Bleeding- more than one tablespoon   Any other symptom or condition that you feel may need urgent attention  Your doctor recommends these additional instructions:  If any biopsies were taken, your doctors clinic will contact you in 1 to 2   weeks with any results.  - Discharge patient to home (ambulatory).   - Patient has a contact number available for emergencies.  The signs and   symptoms of potential delayed complications were discussed with the   patient.  Return to normal activities tomorrow.  Written discharge   instructions were provided to the patient.   - Resume previous diet.   - Continue present medications.   - Return to primary care physician as previously scheduled.   - Repeat colonoscopy in 7 years for surveillance based on pathology   results.  For questions, problems or results please call your physician - Ike Agarwal MD at Work:  [phone].  [facility], EMERGENCY ROOM PHONE NUMBER: [phone]  IF A COMPLICATION OR EMERGENCY SITUATION ARISES AND YOU ARE UNABLE TO REACH   YOUR PHYSICIAN - GO DIRECTLY TO THE EMERGENCY ROOM.  Ike Agarwal MD  1/3/2024 2:07:39 PM  This report has been verified and signed electronically.  Dear patient,  As a result of recent federal legislation (The Federal Cures Act), you may   receive lab or pathology results from your procedure in your [facility]   account before your physician is able to contact you. Your physician or   their representative will relay the results to you with their   recommendations at their soonest availability.  Thank you,  [employer]ATION

## 2024-01-03 NOTE — ANESTHESIA POSTPROCEDURE EVALUATION
Anesthesia Post Evaluation    Patient: Aileen Alegria    Procedure(s) Performed: Procedure(s) (LRB):  COLONOSCOPY (N/A)    Final Anesthesia Type: general      Patient location during evaluation: GI PACU  Patient participation: Yes- Able to Participate  Level of consciousness: awake and alert  Post-procedure vital signs: reviewed and stable  Pain management: adequate  Airway patency: patent  MYLA mitigation strategies: Multimodal analgesia  PONV status at discharge: No PONV  Anesthetic complications: no      Cardiovascular status: stable  Respiratory status: unassisted and spontaneous ventilation  Hydration status: euvolemic  Follow-up not needed.              Vitals Value Taken Time   /70 01/03/24 1410   Temp  01/03/24 1446   Pulse 77 01/03/24 1410   Resp 20 01/03/24 1410   SpO2 100 % 01/03/24 1410         No case tracking events are documented in the log.      Pain/Ann Marie Score: Ann Marie Score: 9 (1/3/2024  2:11 PM)

## 2024-01-03 NOTE — ANESTHESIA PREPROCEDURE EVALUATION
01/03/2024  Aileen Alegria is a 63 y.o., female.    Pre-operative evaluation for Procedure(s) (LRB):  COLONOSCOPY (N/A)    Aileen Alegria is a 63 y.o. female     Patient Active Problem List   Diagnosis    Decreased range of motion of left shoulder    Decreased strength    Cigarette nicotine dependence without complication    Rotator cuff tear arthropathy of left shoulder    Environmental and seasonal allergies    H/O cervical spine surgery    Type 2 diabetes mellitus without complication, without long-term current use of insulin    Hyperlipidemia LDL goal <100    Allergic rhinitis due to house dust mite    Bronchitis    Chronic right shoulder pain    10 year risk of MI or stroke 7.5% or greater    Encounter for smoking cessation counseling    Cervical myelopathy    Moderate persistent asthma without complication    Asthma       Review of patient's allergies indicates:   Allergen Reactions    Diphenhydramine hcl Anaphylaxis       No current facility-administered medications on file prior to encounter.     Current Outpatient Medications on File Prior to Encounter   Medication Sig Dispense Refill    albuterol (ACCUNEB) 1.25 mg/3 mL Nebu USE 1 VIAL PER NEBULIZER EVERY 6 HOURS AS NEEDED FOR SHORTNESS OF BREATH OR WHEEZING 225 mL 1    albuterol (VENTOLIN HFA) 90 mcg/actuation inhaler Inhale 2 puffs into the lungs every 6 (six) hours as needed for Wheezing or Shortness of Breath. Rescue 18 g 2    aspirin (ECOTRIN) 81 MG EC tablet Take 1 tablet (81 mg total) by mouth once daily. 90 tablet 3    atorvastatin (LIPITOR) 10 MG tablet Take 1 tablet (10 mg total) by mouth every evening. 90 tablet 3    budesonide-formoterol 160-4.5 mcg (SYMBICORT) 160-4.5 mcg/actuation HFAA Inhale 2 puffs into the lungs every 12 (twelve) hours. 20.4 g 6    buPROPion (WELLBUTRIN SR) 150 MG TBSR 12 hr tablet Take 1 tablet (150 mg total) by mouth  once daily for 3 days, THEN 1 tablet (150 mg total) 2 (two) times daily. 177 tablet 0    camphor-menthoL 0.2-3.5 % Gel Apply 1 application topically 2 (two) times daily as needed (pain). 113.4 g 3    cetirizine (ZYRTEC) 10 MG tablet Take 1 tablet (10 mg total) by mouth once daily. 90 tablet 3    cyclobenzaprine (FLEXERIL) 10 MG tablet       DULoxetine (CYMBALTA) 30 MG capsule Take 1 capsule (30 mg total) by mouth once daily. 30 capsule 11    famotidine (PEPCID) 20 MG tablet Take 1 tablet (20 mg total) by mouth once daily. 90 tablet 3    fluticasone (FLONASE SENSIMIST) 27.5 mcg/actuation nasal spray 2 sprays by Nasal route once daily. 9.1 mL 11    HYDROcodone-acetaminophen (NORCO) 5-325 mg per tablet Take 1 tablet by mouth every 6 (six) hours as needed.      levocetirizine (XYZAL) 5 MG tablet Take 1 tablet (5 mg total) by mouth every evening. 90 tablet 3    meloxicam (MOBIC) 15 MG tablet Take 15 mg by mouth once daily.      methocarbamoL (ROBAXIN) 500 MG Tab SMARTSI Tablet(s) By Mouth Every 12 Hours      methylPREDNISolone (MEDROL DOSEPACK) 4 mg tablet use as directed 21 each 0    mometasone (NASONEX) 50 mcg/actuation nasal spray 2 sprays by Nasal route 2 (two) times daily. 2 each 5    montelukast (SINGULAIR) 10 mg tablet Take 1 tablet (10 mg total) by mouth every evening. 30 tablet 11    naproxen (NAPROSYN) 500 MG tablet TK 1 T PO BID PRF PAIN      ondansetron (ZOFRAN-ODT) 4 MG TbDL       pneumoc 20-faheem conj-dip cr,PF, (PREVNAR 20, PF,) 0.5 mL Syrg injection Inject into the muscle. 0.5 mL 0    pregabalin (LYRICA) 75 MG capsule Take 1 capsule (75 mg total) by mouth 2 (two) times daily. 60 capsule 2    traMADoL (ULTRAM) 50 mg tablet SMARTSI Tablet(s) By Mouth Every 12 Hours PRN         Past Surgical History:   Procedure Laterality Date     SECTION      NECK SURGERY  2020    C 2-C7       Social History     Socioeconomic History    Marital status: Single   Tobacco Use    Smoking status: Every Day      "Current packs/day: 1.00     Types: Cigarettes    Smokeless tobacco: Never   Substance and Sexual Activity    Alcohol use: Yes     Comment: socially    Sexual activity: Not Currently     Social Determinants of Health     Financial Resource Strain: Low Risk  (2/25/2022)    Overall Financial Resource Strain (CARDIA)     Difficulty of Paying Living Expenses: Not hard at all   Food Insecurity: No Food Insecurity (2/25/2022)    Hunger Vital Sign     Worried About Running Out of Food in the Last Year: Never true     Ran Out of Food in the Last Year: Never true   Transportation Needs: No Transportation Needs (2/25/2022)    PRAPARE - Transportation     Lack of Transportation (Medical): No     Lack of Transportation (Non-Medical): No   Physical Activity: Insufficiently Active (6/4/2021)    Exercise Vital Sign     Days of Exercise per Week: 3 days     Minutes of Exercise per Session: 30 min   Stress: No Stress Concern Present (2/25/2022)    [employer] - Occupational Stress Questionnaire     Feeling of Stress : Only a little   Social Connections: Moderately Isolated (2/25/2022)    Social Connection and Isolation Panel [NHANES]     Frequency of Communication with Friends and Family: More than three times a week     Frequency of Social Gatherings with Friends and Family: Once a week     Attends Orthodoxy Services: More than 4 times per year     Active Member of Clubs or Organizations: No     Attends Club or Organization Meetings: Never     Marital Status: Never    Housing Stability: Low Risk  (2/25/2022)    Housing Stability Vital Sign     Unable to Pay for Housing in the Last Year: No     Number of Places Lived in the Last Year: 1     Unstable Housing in the Last Year: No         CBC: No results for input(s): "WBC", "RBC", "HGB", "HCT", "PLT", "MCV", "MCH", "MCHC" in the last 72 hours.    CMP: No results for input(s): "NA", "K", "CL", "CO2", "BUN", "CREATININE", "GLU", "MG", "PHOS", " ""CALCIUM", "ALBUMIN", "PROT", "ALKPHOS", "ALT", "AST", "BILITOT" in the last 72 hours.    INR  No results for input(s): "PT", "INR", "PROTIME", "APTT" in the last 72 hours.        Diagnostic Studies:      EKD Echo:  No results found for this or any previous visit.       Pre-op Assessment    I have reviewed the Patient Summary Reports.     I have reviewed the Nursing Notes. I have reviewed the NPO Status.   I have reviewed the Medications.     Review of Systems  Anesthesia Hx:  No problems with previous Anesthesia                Social:  Smoker, Social Alcohol Use       Cardiovascular:  Exercise tolerance: good              hyperlipidemia                             Pulmonary:   COPD Asthma moderate                   Hepatic/GI:     GERD, well controlled             Musculoskeletal:         Spine Disorders: cervical            Neurological:  Neurology Normal                                          Physical Exam  General: Well nourished, Cooperative and Alert    Airway:  Mallampati: III   Mouth Opening: Normal  TM Distance: Normal  Tongue: Large  Neck ROM: Extension Decreased    Dental:  Dentures, Partial Dentures    Chest/Lungs:  Normal Respiratory Rate    Heart:  Rate: Normal        Anesthesia Plan  Type of Anesthesia, risks & benefits discussed:    Anesthesia Type: Gen ETT, Gen Natural Airway  Intra-op Monitoring Plan: Standard ASA Monitors  Post Op Pain Control Plan: multimodal analgesia  Induction:  IV  Informed Consent: Informed consent signed with the Patient and all parties understand the risks and agree with anesthesia plan.  All questions answered.   ASA Score: 2  Day of Surgery Review of History & Physical: H&P Update referred to the surgeon/provider.    Ready For Surgery From Anesthesia Perspective.     .      "

## 2024-01-11 LAB
FINAL PATHOLOGIC DIAGNOSIS: NORMAL
GROSS: NORMAL
Lab: NORMAL

## 2024-02-28 ENCOUNTER — PATIENT MESSAGE (OUTPATIENT)
Dept: ADMINISTRATIVE | Facility: HOSPITAL | Age: 64
End: 2024-02-28
Payer: MEDICARE

## 2024-05-29 ENCOUNTER — PATIENT MESSAGE (OUTPATIENT)
Dept: ADMINISTRATIVE | Facility: HOSPITAL | Age: 64
End: 2024-05-29
Payer: COMMERCIAL

## 2024-06-13 ENCOUNTER — OFFICE VISIT (OUTPATIENT)
Dept: PRIMARY CARE CLINIC | Facility: CLINIC | Age: 64
End: 2024-06-13
Payer: COMMERCIAL

## 2024-06-13 VITALS
WEIGHT: 130.06 LBS | SYSTOLIC BLOOD PRESSURE: 128 MMHG | BODY MASS INDEX: 22.2 KG/M2 | OXYGEN SATURATION: 99 % | HEART RATE: 95 BPM | HEIGHT: 64 IN | DIASTOLIC BLOOD PRESSURE: 66 MMHG | TEMPERATURE: 99 F

## 2024-06-13 DIAGNOSIS — Z00.00 ENCOUNTER FOR PREVENTATIVE ADULT HEALTH CARE EXAMINATION: ICD-10-CM

## 2024-06-13 DIAGNOSIS — Z87.891 PERSONAL HISTORY OF NICOTINE DEPENDENCE: ICD-10-CM

## 2024-06-13 DIAGNOSIS — Z12.31 SCREENING MAMMOGRAM FOR BREAST CANCER: ICD-10-CM

## 2024-06-13 DIAGNOSIS — M25.562 CHRONIC PAIN OF BOTH KNEES: ICD-10-CM

## 2024-06-13 DIAGNOSIS — G89.29 CHRONIC PAIN OF BOTH KNEES: ICD-10-CM

## 2024-06-13 DIAGNOSIS — M25.561 CHRONIC PAIN OF BOTH KNEES: ICD-10-CM

## 2024-06-13 DIAGNOSIS — E11.9 TYPE 2 DIABETES MELLITUS WITHOUT COMPLICATION, WITHOUT LONG-TERM CURRENT USE OF INSULIN: Primary | ICD-10-CM

## 2024-06-13 DIAGNOSIS — G95.9 CERVICAL MYELOPATHY: ICD-10-CM

## 2024-06-13 PROCEDURE — 1159F MED LIST DOCD IN RCRD: CPT | Mod: CPTII,S$GLB,, | Performed by: STUDENT IN AN ORGANIZED HEALTH CARE EDUCATION/TRAINING PROGRAM

## 2024-06-13 PROCEDURE — 3008F BODY MASS INDEX DOCD: CPT | Mod: CPTII,S$GLB,, | Performed by: STUDENT IN AN ORGANIZED HEALTH CARE EDUCATION/TRAINING PROGRAM

## 2024-06-13 PROCEDURE — 3072F LOW RISK FOR RETINOPATHY: CPT | Mod: CPTII,S$GLB,, | Performed by: STUDENT IN AN ORGANIZED HEALTH CARE EDUCATION/TRAINING PROGRAM

## 2024-06-13 PROCEDURE — 99396 PREV VISIT EST AGE 40-64: CPT | Mod: S$GLB,,, | Performed by: STUDENT IN AN ORGANIZED HEALTH CARE EDUCATION/TRAINING PROGRAM

## 2024-06-13 PROCEDURE — 99999 PR PBB SHADOW E&M-EST. PATIENT-LVL V: CPT | Mod: PBBFAC,,, | Performed by: STUDENT IN AN ORGANIZED HEALTH CARE EDUCATION/TRAINING PROGRAM

## 2024-06-13 PROCEDURE — 3074F SYST BP LT 130 MM HG: CPT | Mod: CPTII,S$GLB,, | Performed by: STUDENT IN AN ORGANIZED HEALTH CARE EDUCATION/TRAINING PROGRAM

## 2024-06-13 PROCEDURE — 1160F RVW MEDS BY RX/DR IN RCRD: CPT | Mod: CPTII,S$GLB,, | Performed by: STUDENT IN AN ORGANIZED HEALTH CARE EDUCATION/TRAINING PROGRAM

## 2024-06-13 PROCEDURE — 3078F DIAST BP <80 MM HG: CPT | Mod: CPTII,S$GLB,, | Performed by: STUDENT IN AN ORGANIZED HEALTH CARE EDUCATION/TRAINING PROGRAM

## 2024-06-13 RX ORDER — ATORVASTATIN CALCIUM 10 MG/1
10 TABLET, FILM COATED ORAL NIGHTLY
Qty: 90 TABLET | Refills: 3 | Status: SHIPPED | OUTPATIENT
Start: 2024-06-13 | End: 2025-06-13

## 2024-06-13 RX ORDER — LORATADINE 10 MG/1
10 TABLET ORAL DAILY PRN
COMMUNITY
Start: 2024-02-29

## 2024-06-13 RX ORDER — DICLOFENAC SODIUM 10 MG/G
2 GEL TOPICAL 4 TIMES DAILY PRN
Qty: 200 G | Refills: 1 | Status: SHIPPED | OUTPATIENT
Start: 2024-06-13

## 2024-06-13 NOTE — PROGRESS NOTES
Primary Care  Annual Office Visit - In Person  6/13/2024          Patient is a 63 y.o.   Aileen Alegria  has a past medical history of Allergy, Environmental and seasonal allergies, and Moderate persistent asthma without complication (4/8/2022).    Patient has no acute complaints today     Smoking 1 PPD   Started in 20s at most 2 PPD   Barrier to quitting is habit      She has chronic pain in knee b/l, hips b/l, and neck   Followed by sports medicine       Active Medications  Current Outpatient Medications   Medication Instructions    albuterol (ACCUNEB) 1.25 mg/3 mL Nebu USE 1 VIAL PER NEBULIZER EVERY 6 HOURS AS NEEDED FOR SHORTNESS OF BREATH OR WHEEZING    albuterol (VENTOLIN HFA) 90 mcg/actuation inhaler 2 puffs, Inhalation, Every 6 hours PRN, Rescue    atorvastatin (LIPITOR) 10 mg, Oral, Nightly    budesonide-formoterol 160-4.5 mcg (SYMBICORT) 160-4.5 mcg/actuation HFAA 2 puffs, Inhalation, Every 12 hours    camphor-menthoL 0.2-3.5 % Gel 1 application , Topical (Top), 2 times daily PRN    cetirizine (ZYRTEC) 10 mg, Oral, Daily    cyclobenzaprine (FLEXERIL) 10 MG tablet No dose, route, or frequency recorded.    diclofenac sodium (VOLTAREN ARTHRITIS PAIN) 2 g, Topical (Top), 4 times daily PRN    DULoxetine (CYMBALTA) 30 mg, Oral, Daily    fluticasone (FLONASE SENSIMIST) 27.5 mcg/actuation nasal spray 2 sprays, Nasal, Daily    levocetirizine (XYZAL) 5 mg, Oral, Nightly    loratadine (CLARITIN) 10 mg, Oral, Daily PRN    mometasone (NASONEX) 50 mcg/actuation nasal spray 2 sprays, Nasal, 2 times daily    montelukast (SINGULAIR) 10 mg, Oral, Nightly    pregabalin (LYRICA) 75 mg, Oral, 2 times daily       Annual Review of Preventative Care    Health Maintenance Due   Topic Date Due    Low Dose Statin  Never done    RSV Vaccine (Age 60+ and Pregnant patients) (1 - 1-dose 60+ series) Never done    COVID-19 Vaccine (4 - 2023-24 season) 09/01/2023    Hemoglobin A1c  11/30/2023    Mammogram  05/30/2024    Lipid Panel   05/30/2024    Diabetes Urine Screening  05/29/2024    Foot Exam  08/04/2024     Diabetes Screening:    Lab Results   Component Value Date    HGBA1C 6.7 (H) 05/30/2023    HGBA1C 7.1 (H) 06/02/2022    HGBA1C 6.6 (H) 05/28/2021     BP Readings from Last 3 Encounters:   06/13/24 128/66   01/03/24 129/70   09/29/23 134/72     Wt Readings from Last 3 Encounters:   06/13/24 1509 59 kg (130 lb 1.1 oz)   01/03/24 1311 61.2 kg (135 lb)   09/29/23 0836 64.5 kg (142 lb 3.2 oz)     Colon Cancer Screening: Next Due 2024  Mammo: Next Due 2024  Pap: Next Due 2026        Physical Exam  Vitals reviewed.   Constitutional:       General: She is not in acute distress.  Eyes:      Pupils: Pupils are equal, round, and reactive to light.   Cardiovascular:      Rate and Rhythm: Normal rate and regular rhythm.      Pulses: Normal pulses.           Dorsalis pedis pulses are 2+ on the right side and 2+ on the left side.        Posterior tibial pulses are 2+ on the right side and 2+ on the left side.      Heart sounds: Normal heart sounds.   Pulmonary:      Effort: Pulmonary effort is normal.      Breath sounds: Normal breath sounds.   Abdominal:      General: Abdomen is flat. Bowel sounds are normal.      Palpations: Abdomen is soft.   Musculoskeletal:      Right lower leg: No edema.      Left lower leg: No edema.   Feet:      Right foot:      Protective Sensation: 5 sites tested.  5 sites sensed.      Skin integrity: Dry skin present.      Toenail Condition: Right toenails are normal.      Left foot:      Protective Sensation: 5 sites tested.  5 sites sensed.      Skin integrity: Dry skin present.      Toenail Condition: Left toenails are normal.             Assessment and Plan     1. Type 2 diabetes mellitus without complication, without long-term current use of insulin  Comments:  Controlled with diet  Orders:  -     BASIC METABOLIC PANEL; Future; Expected date: 06/13/2024  -     Lipid Panel; Future; Expected date: 06/13/2024  -      Hemoglobin A1C; Future; Expected date: 06/13/2024  -     Cancel: Microalbumin/Creatinine Ratio, Urine  -     atorvastatin (LIPITOR) 10 MG tablet; Take 1 tablet (10 mg total) by mouth every evening.  Dispense: 90 tablet; Refill: 3  -     Microalbumin/Creatinine Ratio, Urine; Future; Expected date: 06/13/2024    2. Encounter for preventative adult health care examination  -     CBC Without Differential; Future; Expected date: 06/13/2024  -     Comprehensive Metabolic Panel; Future; Expected date: 06/13/2024  -     TSH; Future; Expected date: 06/13/2024    3. Screening mammogram for breast cancer  -     Mammo Digital Screening Bilat w/ Fabio; Future; Expected date: 06/13/2024    4. Cervical myelopathy  Comments:  Continue Lyrica    5. Personal history of nicotine dependence  -     Ambulatory referral/consult to Smoking Cessation Program; Future; Expected date: 06/20/2024  Benefits to cessation discussed. Patient strongly advised to quit smoking. At this time they are  willing to quit.   3 minutes spent counseling patient regarding quitting smoking to improve overall health.    6. Chronic pain of both knees  -     diclofenac sodium (VOLTAREN ARTHRITIS PAIN) 1 % Gel; Apply 2 g topically 4 (four) times daily as needed (joint pain).  Dispense: 200 g; Refill: 1                                     Upcoming Scheduled Appointments and Follow Up:    No future appointments.    Follow Up [facility] (with who? when?): No follow-ups on file.        Extended Emergency Contact Information  Primary Emergency Contact: Concepcion Gong   [place]  Mobile Phone: [phone]  Relation: Daughter      Genesis MD Jose   Internal Medicine  6/13/2024 - 3:30 PM    I spent a total of 30 minutes on the day of the visit.This includes face to face time and non-face to face time preparing to see the patient (eg, review of tests), obtaining and/or reviewing separately obtained history, documenting clinical information in the  electronic or other health record, independently interpreting results and communicating results to the patient/family/caregiver, or care coordinator.    While patients have the right to access their medical record, it is essential to recognize that progress notes primarily serve as a means of communication among healthcare professionals.

## 2024-07-03 ENCOUNTER — CLINICAL SUPPORT (OUTPATIENT)
Dept: SMOKING CESSATION | Facility: CLINIC | Age: 64
End: 2024-07-03
Payer: COMMERCIAL

## 2024-07-03 DIAGNOSIS — F17.200 NICOTINE DEPENDENCE: Primary | ICD-10-CM

## 2024-07-03 PROCEDURE — 99404 PREV MED CNSL INDIV APPRX 60: CPT | Mod: S$GLB,,,

## 2024-07-03 RX ORDER — DM/P-EPHED/ACETAMINOPH/DOXYLAM 30-7.5/3
2 LIQUID (ML) ORAL
Qty: 168 LOZENGE | Refills: 0 | Status: SHIPPED | OUTPATIENT
Start: 2024-07-03

## 2024-07-03 NOTE — Clinical Note
Patient was seen in clinic today for intake.  Patient smoking 1 ppd. Patient's CO monitor was 18  ppm. Patient states that she used nicotine patches in the past and had rashes.  Patient states that she does not like nicotine gums. Patient will begin the prescribed tobacco cessation medication regimen of  2 mg nicotine lozenges.  Completion of TCRS (Tobacco Cessation Rating Scale) reviewed learned addiction, model, personal reasons for quitting, medications, goals and quit date.  Patient's goal is to be tobacco free by her birthday 12-.  Prescribed medication management will be by physician.  Patient will continue with sessions and medication monitoring by CTTS.

## 2024-07-09 ENCOUNTER — TELEPHONE (OUTPATIENT)
Dept: SMOKING CESSATION | Facility: CLINIC | Age: 64
End: 2024-07-09
Payer: COMMERCIAL

## 2024-07-09 NOTE — TELEPHONE ENCOUNTER
Patient called and left a message stating that she cannot pick nicotine lozenges from her pharmacy.  Called [facility] pharmacy and got the issue resolved.  Called patient to inform her that the issue had resolved.

## 2024-07-17 ENCOUNTER — CLINICAL SUPPORT (OUTPATIENT)
Dept: SMOKING CESSATION | Facility: CLINIC | Age: 64
End: 2024-07-17
Payer: COMMERCIAL

## 2024-07-17 DIAGNOSIS — F17.200 NICOTINE DEPENDENCE: Primary | ICD-10-CM

## 2024-07-17 PROCEDURE — 99404 PREV MED CNSL INDIV APPRX 60: CPT | Mod: S$GLB,,,

## 2024-07-17 NOTE — Clinical Note
Patient was seen in clinic today for follow up.  Patient states smoking  6-7 cpd down from 1 ppd.  Patient reports started  vaping with nicotine 6 months ago.  Patient also states that she try to quit by vaping.  Patient reports did not start the prescribed tobacco cessation medication regimen of  2 mg nicotine lozenges due to out of stock.  Patient states she will start today. We discussed negative aspects of vaping and smoking.  We reviewed the importance of quitting and health benefits to expect. We discussed the importance of behavior change and building willpower. Encouraged patient to try a rate reduction to about 4-5  cpd for next visit. Patient will continue bi weekly sessions.

## 2024-07-17 NOTE — PROGRESS NOTES
Individual Follow-Up Form    7/17/2024    Quit Date:     Clinical Status of Patient: Outpatient    Length of Service: 60 minutes    Continuing Medication: no    Other Medications: none     Target Symptoms: Withdrawal and medication side effects. The following were  rated moderate (3) to severe (4) on TCRS:  Moderate (3): none  Severe (4): none    Comments: Patient was seen in clinic today for follow up.  Patient states smoking  6-7 cpd down from 1 ppd.  Patient reports started  vaping with nicotine 6 months ago.  Patient also states that she try to quit by vaping.  Patient reports did not start the prescribed tobacco cessation medication regimen of  2 mg nicotine lozenges due to out of stock.  Patient states she will start today. We discussed negative aspects of vaping and smoking.  We reviewed the importance of quitting and health benefits to expect. We discussed the importance of behavior change and building willpower. Encouraged patient to try a rate reduction to about 4-5  cpd for next visit. Patient will continue bi weekly sessions.      Diagnosis: F17.200    Next Visit: 2 weeks

## 2024-07-18 ENCOUNTER — OFFICE VISIT (OUTPATIENT)
Dept: ALLERGY | Facility: CLINIC | Age: 64
End: 2024-07-18
Payer: MEDICARE

## 2024-07-18 DIAGNOSIS — J45.909 ASTHMA, UNSPECIFIED ASTHMA SEVERITY, UNSPECIFIED WHETHER COMPLICATED, UNSPECIFIED WHETHER PERSISTENT: ICD-10-CM

## 2024-07-18 DIAGNOSIS — H10.403 CHRONIC CONJUNCTIVITIS OF BOTH EYES, UNSPECIFIED CHRONIC CONJUNCTIVITIS TYPE: ICD-10-CM

## 2024-07-18 DIAGNOSIS — R09.81 NASAL CONGESTION: Primary | ICD-10-CM

## 2024-07-18 PROCEDURE — 1159F MED LIST DOCD IN RCRD: CPT | Mod: CPTII,S$GLB,, | Performed by: STUDENT IN AN ORGANIZED HEALTH CARE EDUCATION/TRAINING PROGRAM

## 2024-07-18 PROCEDURE — 1160F RVW MEDS BY RX/DR IN RCRD: CPT | Mod: CPTII,S$GLB,, | Performed by: STUDENT IN AN ORGANIZED HEALTH CARE EDUCATION/TRAINING PROGRAM

## 2024-07-18 PROCEDURE — 3072F LOW RISK FOR RETINOPATHY: CPT | Mod: CPTII,S$GLB,, | Performed by: STUDENT IN AN ORGANIZED HEALTH CARE EDUCATION/TRAINING PROGRAM

## 2024-07-18 PROCEDURE — 99215 OFFICE O/P EST HI 40 MIN: CPT | Mod: S$GLB,,, | Performed by: STUDENT IN AN ORGANIZED HEALTH CARE EDUCATION/TRAINING PROGRAM

## 2024-07-18 PROCEDURE — 99999 PR PBB SHADOW E&M-EST. PATIENT-LVL II: CPT | Mod: PBBFAC,,, | Performed by: STUDENT IN AN ORGANIZED HEALTH CARE EDUCATION/TRAINING PROGRAM

## 2024-07-18 RX ORDER — KETOROLAC TROMETHAMINE 5 MG/ML
1 SOLUTION OPHTHALMIC 4 TIMES DAILY PRN
Qty: 10 ML | Refills: 1 | Status: SHIPPED | OUTPATIENT
Start: 2024-07-18

## 2024-07-18 NOTE — PROGRESS NOTES
Allergy Clinic Note  [facility]    This note was created by combination of typed  and M-Modal dictation. Transcription errors may be present.  If there are any questions, please contact me.      Subjective:      Patient ID: Aileen Alegria is a 63 y.o. female.    Chief Complaint: No chief complaint on file.      Allergy problem list:     Chronic allergic rhinitis: dust mite, pet dog  Chest symptoms  Frequent sinusitis  Smoking  (Hypertension)    History of Present Illness:  60-year-old female with chronic rhinitis, chest symptoms,   was last seen 01/19/2022.  She is here alone, and the history is extremely difficult  Related medications    Pt is not sure  Med list has Symbicort, 2 nasal steroids, 3 antihistamines and montelukast.  Client says she is taking 1 pill twice a day and all other medicines as needed.    7/18/2024:  Clients chief complaint is a frontal headache.  She also reports runny and stuffy nose.  She describes shortness of breath that it is not clear whether she is describing upper airway or lower airway symptoms.  She says she uses albuterol 2 to 3 times a week, but it appears she takes this when her nose is clogged.  She says her nasal symptoms are worse in early morning and that it takes about 45 minutes for her to clear out her nose.  She has never used nasal rinses.  She also complains of itchy eyes.  She is not able to quantify her chest symptoms.  She requests a replacement nebulizer.  She does not know her medications.    1/19/22:    Client says that for the last 2-3 weeks she has been experiencing both head and chest symptoms.  The head symptoms consist of a right-sided headache, itchy throat, itchy right ear, sore nostrils, runny nose, sneezing, soreness on the bridge of her nose extending toward her maxillary sinuses, and copious white material from her nose.  She denies fever or purulence.  She also denies ill exposures.      Over the same time.  She has been  [Bladder Hygiene] : bladder hygiene "experiencing chest "itching" and tightness.  This is associated with severe shortness of breath she also admits to wheezing and coughing.  Symptoms are severe in interfere with her ADLs such as house cleaning and also interrupt her sleep.  She says she obtains complete relief with 1-4 albuterol ampules.  This relief last 4-5 hours.  She does awaken at night due to shortness of breath every night she is using at least 12 ampules of albuterol by neb per day.  She is not taking any other medications.    She reports her last asthma exacerbation was 2-3 months ago.      5/26/2021:  She reported that her breathing had been bad for the prior two months.  She is unable to tell me whether she is referring to breathing in her chest, breathing in her nose, or both.  The symptoms she can define are fatigue, itching of eyes and nose, headache and sore nose.  She also reports "grasping" but is unable to define further.  She reports she was blowing albuterol from her son's nebulizer into her nose but stopped due to drying sensation.  Did not know what medications she was taking.  She was referred to ENT, given her own nebulizer, increased her Xyzal dose to control the itching.    10/8/2020:  At her initial visit  she reported a long history of rhinitis.  Her chief complaint was bilateral ear itching which kept her awake at night.  Other symptoms include complete nasal blockage at times, nasal congestion, runny nose, postnasal drip sensation, sneezing, itchy eyes, red eyes and throat clearing.  Symptoms are severe and interfere with her ability to function.  She says she often takes naps in order to avoid feeling these symptoms.  This is despite very frequent use of antihistamines and nasal steroids on a daily basis.  Precipitants include smoke and fresh cut grass.  Symptoms are sometimes worse outside.  They are perennial without a noticeable variation.  When symptoms are severe she often gets sinus infections.  These are manifest " [Contraception/ Emergency Contraception/ Safe Sexual Practices] : contraception, emergency contraception, safe sexual practices [Confidentiality] : confidentiality primarily with tenderness of her forehead and complete nasal blockage.  She has had no workup to date.  She admits an improvement in her symptoms (but not complete resolution) when she was taking steroids for torn rotator cuff earlier this year.  She had no steroid side effects.    For the previous 6 months (since about April 2020) she had also been experiencing chest symptoms. She describes an itching feeling in the center of her chest.  This is associated with chest tightness, shortness of breath, wheezing, and coughing.  At times the shortness of breath is severe.  She has been helped by a relative's albuterol by nebulizer.  These severe shortness of breath episodes have occurred twice in the last 6 months.  There are no clear precipitants.  She has had no workup.    Additional History:  Interval history is significant for new Dx of diabetes. Past medical history is significant for smoking and hypertension.  No Hx of ENT surgery but did have neck surgery in 2020.  Family history is significant asthma and allergies in her children.  Client  reports that she has been smoking cigarettes and vaping with nicotine. She started smoking about 40 years ago. She has a 47.5 pack-year smoking history. She has never used smokeless tobacco.  Exposures are notable for a pet dog and frequent house cleaning.  She is also exposed to both passive and active smoke.      Patient Active Problem List   Diagnosis    Decreased range of motion of left shoulder    Decreased strength    Cigarette nicotine dependence without complication    Rotator cuff tear arthropathy of left shoulder    Environmental and seasonal allergies    H/O cervical spine surgery    Type 2 diabetes mellitus without complication, without long-term current use of insulin    Hyperlipidemia LDL goal <100    Allergic rhinitis due to house dust mite    Bronchitis    Chronic right shoulder pain    10 year risk of MI or stroke 7.5% or greater    Encounter for smoking  [STD (testing, results, tx)] : STD (testing, results, tx) cessation counseling    Cervical myelopathy    Moderate persistent asthma without complication    Asthma     Current Outpatient Medications on File Prior to Visit   Medication Sig Dispense Refill    albuterol (ACCUNEB) 1.25 mg/3 mL Nebu USE 1 VIAL PER NEBULIZER EVERY 6 HOURS AS NEEDED FOR SHORTNESS OF BREATH OR WHEEZING 225 mL 1    albuterol (VENTOLIN HFA) 90 mcg/actuation inhaler Inhale 2 puffs into the lungs every 6 (six) hours as needed for Wheezing or Shortness of Breath. Rescue 18 g 2    atorvastatin (LIPITOR) 10 MG tablet Take 1 tablet (10 mg total) by mouth every evening. 90 tablet 3    budesonide-formoterol 160-4.5 mcg (SYMBICORT) 160-4.5 mcg/actuation HFAA Inhale 2 puffs into the lungs every 12 (twelve) hours. 20.4 g 6    camphor-menthoL 0.2-3.5 % Gel Apply 1 application topically 2 (two) times daily as needed (pain). 113.4 g 3    cetirizine (ZYRTEC) 10 MG tablet Take 1 tablet (10 mg total) by mouth once daily. 90 tablet 3    cyclobenzaprine (FLEXERIL) 10 MG tablet       diclofenac sodium (VOLTAREN ARTHRITIS PAIN) 1 % Gel Apply 2 g topically 4 (four) times daily as needed (joint pain). 200 g 1    DULoxetine (CYMBALTA) 30 MG capsule Take 1 capsule (30 mg total) by mouth once daily. 30 capsule 11    fluticasone (FLONASE SENSIMIST) 27.5 mcg/actuation nasal spray 2 sprays by Nasal route once daily. 9.1 mL 11    levocetirizine (XYZAL) 5 MG tablet Take 1 tablet (5 mg total) by mouth every evening. 90 tablet 3    loratadine (CLARITIN) 10 mg tablet Take 10 mg by mouth daily as needed.      mometasone (NASONEX) 50 mcg/actuation nasal spray 2 sprays by Nasal route 2 (two) times daily. 2 each 5    montelukast (SINGULAIR) 10 mg tablet Take 1 tablet (10 mg total) by mouth every evening. 30 tablet 11    nicotine polacrilex 2 MG Lozg Take 1 lozenge (2 mg total) by mouth as needed (Use in place of cigarettes, up to 12 per day). 168 lozenge 0    pregabalin (LYRICA) 75 MG capsule Take 1 capsule (75 mg total) by mouth 2 (two)  [Lab Results] : lab results times daily. 60 capsule 2     No current facility-administered medications on file prior to visit.         Review of Systems   Constitutional:  Negative for chills and fever.   HENT:  Positive for congestion. Negative for ear discharge and ear pain.    Eyes:  Negative for pain and discharge.   Respiratory:  Negative for stridor.    Cardiovascular:  Negative for chest pain and palpitations.   Skin:  Negative for itching and rash.       Objective:   There were no vitals taken for this visit.      Physical Exam  GEN: Awake and alert, no distress  DERM:  No flushing, no rashes  EYE:  No occular discharge, no redness  HEENT: No nasal discharge, no hoarseness  PULM: Normal work of breathing, no cough  NEURO:  No focal deficit, speech fluent and logical  PSYCH: appropriate affect, normal behavior      Data:   Aeroallergen testing by the Immunocap method (10/07/2020) was positive for dust mite and dog.   Class III   dust mite (Df and Dp), dog  All other allergens less than 0.35 KU/L. Ragweed 0.11  Total serum IgE 74    Eosinophil count 500 on CBC of 8/03/2020    Assessment:     1. Nasal congestion    2. Chronic conjunctivitis of both eyes, unspecified chronic conjunctivitis type    3. Asthma, unspecified asthma severity, unspecified whether complicated, unspecified whether persistent          Plan:   This is a difficult case with an extremely limited history. Client is definitely experiencing upper airway symptoms with nasal congestion and nasal blockage.  Recommended addition of nasal rinses prior to using nasal steroid of choice.  Extent of lower airway symptoms is unknown.  Recommend PFTs to assist with clarification of asthma status.      Diagnoses and all orders for this visit:    Nasal congestion  -     Ambulatory referral/consult to ENT; Future    Chronic conjunctivitis of both eyes, unspecified chronic conjunctivitis type  -     ketorolac 0.5% (ACULAR) 0.5 % Drop; Place 1 drop into both eyes 4 (four) times daily as  [Medication Management] : medication management needed (eye itching).    Asthma, unspecified asthma severity, unspecified whether complicated, unspecified whether persistent  -     NEBULIZER FOR HOME USE          Patient Instructions     Please send me portal message with meds you are currently taking.    When I receive list, I will send refills to your pharmacy.    Follow up 3 months or sooner as needed    Follow up in about 3 months (around 10/18/2024).    Doreen Hull MD    I spent a total of 41 minutes on the day of the visit. This includes face to face time and non-face to face time preparing to see the patient (eg, review of tests), obtaining and/or reviewing separately obtained history, documenting clinical information in the electronic or other health record, independently interpreting results and communicating results to the patient/family/caregiver, or care coordinator.

## 2024-07-18 NOTE — PATIENT INSTRUCTIONS
Please send me portal message with meds you are currently taking.    When I receive list, I will send refills to your pharmacy.    Follow up 3 months or sooner as needed

## 2024-07-19 DIAGNOSIS — L29.9 PRURITUS: ICD-10-CM

## 2024-07-19 DIAGNOSIS — J30.9 CHRONIC ALLERGIC RHINITIS: ICD-10-CM

## 2024-07-19 NOTE — TELEPHONE ENCOUNTER
I sent refill request for medications list, all except the Meloxicam. It was not in her list with an option to do so.

## 2024-07-19 NOTE — TELEPHONE ENCOUNTER
----- Message from Katrin Abraham sent at 7/19/2024  3:11 PM CDT -----  Regarding: call back  Contact: [phone]  Pt requesting a refill   Medication name cetirizine (ZYRTEC) 10 MG tablet  and levocetirizine (XYZAL) 5 MG tablet  and  loratadine (CLARITIN) 10 mg tablet  and montelukast (SINGULAIR) 10 mg tablet and meloxicam 15mg  and albuterol (VENTOLIN HFA) 90 mcg/actuation inhaler and   Pharmacy   [id number] - [facility] [address] AT [place] [id number] [facility] [address]  Phone: [phone] Fax: [phone]

## 2024-07-19 NOTE — TELEPHONE ENCOUNTER
Avoid bubble baths.  Wear cotton underwear.  Thongs may cause migration of bacteria, avoid wearing if possible and don’t wear to bed.  If a culture was taken, you should be notified in 2-3 days of results.  Finish all antibiotics as prescribed to prevent resistance.  Drink at least 8 8oz. cups of fluids per day.  Drink cranberry juice 1-2 times per day.  Wipe from front to back when using toilet.  Urinate after intercourse.  If using pyridium, only take for a maximum of 2 days and may cause orange urine color.  May use Ibuprofen 200-600 mg every 6-8 hrs as needed for pain.  May use Acetaminophen 325-650mg every 4-6 hrs as needed for pain or fever.  If taking any contraceptives, use another form of birth control for 3 weeks after antibiotic is completed and may have break through bleeding due to decreased effectiveness of birth control pill when taken with antibiotics    Follow-up with your primary care provider or OBGYN in 2-3 days if no improvement or worsening symptoms.  Go directly to ER for fevers above 101 or severe back or flank pain despite treatment.       LOV 7/18/24

## 2024-07-19 NOTE — TELEPHONE ENCOUNTER
----- Message from Katrin Abraham sent at 7/19/2024  3:11 PM CDT -----  Regarding: call back  Contact: [phone]  Pt requesting a refill   Medication name cetirizine (ZYRTEC) 10 MG tablet  and levocetirizine (XYZAL) 5 MG tablet  and  loratadine (CLARITIN) 10 mg tablet  and montelukast (SINGULAIR) 10 mg tablet and meloxicam 15mg  and albuterol (VENTOLIN HFA) 90 mcg/actuation inhaler and   Pharmacy   [facility] #[id number] - [facility] [address] AT [place] [address] [facility] [address]  Phone: [phone] Fax: [phone]

## 2024-07-22 RX ORDER — LEVOCETIRIZINE DIHYDROCHLORIDE 5 MG/1
5 TABLET, FILM COATED ORAL NIGHTLY
Qty: 90 TABLET | Refills: 3 | Status: SHIPPED | OUTPATIENT
Start: 2024-07-22 | End: 2025-07-22

## 2024-07-22 RX ORDER — MONTELUKAST SODIUM 10 MG/1
10 TABLET ORAL NIGHTLY
Qty: 30 TABLET | Refills: 11 | Status: SHIPPED | OUTPATIENT
Start: 2024-07-22

## 2024-07-22 RX ORDER — ALBUTEROL SULFATE 90 UG/1
2 AEROSOL, METERED RESPIRATORY (INHALATION) EVERY 6 HOURS PRN
Qty: 18 G | Refills: 2 | Status: SHIPPED | OUTPATIENT
Start: 2024-07-22

## 2024-07-22 RX ORDER — CETIRIZINE HYDROCHLORIDE 10 MG/1
10 TABLET ORAL DAILY
Qty: 90 TABLET | Refills: 3 | Status: SHIPPED | OUTPATIENT
Start: 2024-07-22

## 2024-07-22 RX ORDER — LORATADINE 10 MG/1
10 TABLET ORAL DAILY PRN
Qty: 90 TABLET | Refills: 3 | Status: SHIPPED | OUTPATIENT
Start: 2024-07-22

## 2024-07-30 ENCOUNTER — TELEPHONE (OUTPATIENT)
Dept: SMOKING CESSATION | Facility: CLINIC | Age: 64
End: 2024-07-30
Payer: MEDICARE

## 2024-07-30 NOTE — TELEPHONE ENCOUNTER
Called to remind patient about appointment for tomorrow.  Patient states that she had death in the family and will be out of town.  Patient would like a call back in about a week.

## 2024-08-07 ENCOUNTER — TELEPHONE (OUTPATIENT)
Dept: SMOKING CESSATION | Facility: CLINIC | Age: 64
End: 2024-08-07
Payer: MEDICARE

## 2024-08-26 ENCOUNTER — TELEPHONE (OUTPATIENT)
Dept: SMOKING CESSATION | Facility: CLINIC | Age: 64
End: 2024-08-26
Payer: MEDICARE

## 2024-08-26 NOTE — TELEPHONE ENCOUNTER
[facility]-called patient for missed follow up appointment.  Patient states that her pipe broke, trying to get it fix.  Patient also states that she received the bill from [facility] and  is not pleased.  Informed patient that she does not have to pay and bring in the bill on her next visit.  Patient states that she will call back to make an appointment.

## 2024-08-28 ENCOUNTER — PATIENT MESSAGE (OUTPATIENT)
Dept: ADMINISTRATIVE | Facility: HOSPITAL | Age: 64
End: 2024-08-28
Payer: MEDICARE

## 2024-09-18 ENCOUNTER — TELEPHONE (OUTPATIENT)
Dept: ALLERGY | Facility: CLINIC | Age: 64
End: 2024-09-18
Payer: MEDICARE

## 2024-09-20 ENCOUNTER — OFFICE VISIT (OUTPATIENT)
Dept: OTOLARYNGOLOGY | Facility: CLINIC | Age: 64
End: 2024-09-20
Payer: MEDICARE

## 2024-09-20 DIAGNOSIS — J32.9 CHRONIC SINUSITIS, UNSPECIFIED LOCATION: Primary | ICD-10-CM

## 2024-09-20 DIAGNOSIS — J34.2 NASAL SEPTAL DEVIATION: ICD-10-CM

## 2024-09-20 DIAGNOSIS — J34.3 HYPERTROPHY OF BOTH INFERIOR NASAL TURBINATES: ICD-10-CM

## 2024-09-20 PROCEDURE — 99999 PR PBB SHADOW E&M-EST. PATIENT-LVL II: CPT | Mod: PBBFAC,,, | Performed by: STUDENT IN AN ORGANIZED HEALTH CARE EDUCATION/TRAINING PROGRAM

## 2024-09-20 NOTE — PROGRESS NOTES
Otolaryngology - Head and Neck Surgery New Patient Visit    9/20/2024    Referring Provider: Self, Aaareferral    Chief Complaint   Patient presents with    Nasal Congestion    sinus issues       History of Present Illness, Otolaryngology Specialty-Specific Exam, and Assessment and Plan:     Aileen Alegria is a 63 y.o. female who presents for evaluation of nasal obstruction , which has been present for over 5 years. She complains of right-greater-than-left nasal obstruction, hyposmia.  Postnasal drainage and thick clear nasal mucus. She denies purulent drainage, previous trauma, vision changes, epistaxis or anosmia. She has been treated with flonase & singulair in the past. She has had allergy testing done in the past and was told she is allergic to ragweed, dust mites & dogs . She denies previous skullbase surgery. She snores but denies any pauses. The assessment of quality and severity of symptoms as measured by the SNOT-22 score is 95 and the STOP-BANG score is 4.     On exam today, the ears are normal. The oral cavity is clear. The neck is clear. The nasopharynx, hypopharynx, and larynx are normal. Nasal endoscopy reveals right septal deviation with spur. Hypertrophic inferior turbinates bilaterally. No nasal masses or nasal polyposis. No purulent drainage in the middle meatus. Nasopharynx clear without lesions. Eustachian tubes WNL.     Impression today is chronic sinusitis, septal deviation and hypertrophic inferior turbinates . I have recommended that she continue her intranasal regime.  We will obtain dedicated imaging for further evaluation of the sinuses.     Thank you for allowing us to participate in the care of your patient. We will continue to keep you informed of her progress.    Sincerely yours,    Hany Stockton MD      Objective     Physical Examination  Vitals -  vitals were not taken for this visit.   Constitutional - General appearance: Normal. Ability to communicate: Normal.  Head & Face -  Overall appearance, scars, masses: Normal. Palpation &/or percussion of face: Normal. Salivary glands: Normal. Facial strength: Normal  ENMT - Otoscopic exam: Normal. Assessment of hearing: Normal. External inspection: Normal. Nasal mucosa, septum, turbinates: Abnormal see exam details. Lips, teeth, gums: Normal. Oropharynx: Normal. Pharyngeal walls/pyriform sinus: Normal. Larynx: Normal. Nasopharynx: Normal  Neck - Neck: Normal. Thyroid: Normal  Lymphatic - Palpation of lymph nodes: Normal  Eyes - Ocular mobility: Normal  Respiratory - Inspection of Chest: Normal  Cardiovascular - Peripheral vascular system: Normal  Neurological/Psychiatric - Orientation: Normal    Review of Systems  ROS     A complete review of systems was obtained 09/20/2024 and reviewed.  The review of systems is negative for symptoms except as described above.    There were no vitals taken for this visit.     Nasal Endoscopy:  9/20/2024    The use of diagnostic nasal endoscopy was considered medically necessary for the evaluation and visualization of the nasal anatomy for symptoms suggestive of nasal or sinus origin. Physical examination (including a nasal speculum evaluation) did not provide sufficient clinical information to establish a diagnosis, or symptoms did not improve or worsened following treatment.     The nasal cavity was decongested with topical 1% phenylephrine and anesthetized with 4% lidocaine.  A rigid 0-degree endoscope was introduced into the nasal cavity.    The patient was seated in the examination chair. After discussion of risks and benefits, a nasal endoscope was inserted into the nose the endoscope was passed along the left nasal floor to the nasopharynx. It was then passed between the middle and superior meatus, nasal turbinates, nasal septum, nasopharynx and sphenoethmoid region. The nasal endoscope was withdrawn and there was no complications. An identical procedure was performed on the right side. I was present for  the entire procedure.The patient tolerated the above procedure well. The findings of this procedure can be found in the dictated note from 9/20/2024 visit.        Data Reviewed    WBC (K/uL)   Date Value   05/28/2021 8.27     Eosinophil % (%)   Date Value   07/07/2008 2.8     Eos # (K/uL)   Date Value   07/07/2008 0.2     Platelets (K/uL)   Date Value   05/28/2021 187     Glucose (mg/dL)   Date Value   06/07/2022 128 (H)     Total IgE (IU/mL)   Date Value   10/07/2020 74       No sinus imaging available.

## 2024-09-27 ENCOUNTER — HOSPITAL ENCOUNTER (OUTPATIENT)
Dept: RADIOLOGY | Facility: HOSPITAL | Age: 64
Discharge: HOME | End: 2024-09-27
Attending: STUDENT IN AN ORGANIZED HEALTH CARE EDUCATION/TRAINING PROGRAM
Payer: MEDICARE

## 2024-09-27 DIAGNOSIS — J32.9 CHRONIC SINUSITIS, UNSPECIFIED LOCATION: ICD-10-CM

## 2024-09-27 PROCEDURE — 70486 CT MAXILLOFACIAL W/O DYE: CPT | Mod: TC

## 2024-09-27 PROCEDURE — 70486 CT MAXILLOFACIAL W/O DYE: CPT | Mod: 26,,, | Performed by: STUDENT IN AN ORGANIZED HEALTH CARE EDUCATION/TRAINING PROGRAM

## 2024-10-12 PROBLEM — M25.511 ACUTE PAIN OF RIGHT SHOULDER: Status: ACTIVE | Noted: 2024-10-12

## 2024-10-12 PROBLEM — M62.838 MUSCLE SPASM: Status: ACTIVE | Noted: 2024-10-12

## 2024-10-16 ENCOUNTER — TELEPHONE (OUTPATIENT)
Dept: OTOLARYNGOLOGY | Facility: CLINIC | Age: 64
End: 2024-10-16
Payer: MEDICARE

## 2024-10-16 DIAGNOSIS — J34.3 HYPERTROPHY OF BOTH INFERIOR NASAL TURBINATES: ICD-10-CM

## 2024-10-16 DIAGNOSIS — J34.2 NASAL SEPTAL DEVIATION: Primary | ICD-10-CM

## 2024-10-16 DIAGNOSIS — J32.9 CHRONIC SINUSITIS, UNSPECIFIED LOCATION: ICD-10-CM

## 2024-10-17 ENCOUNTER — CLINICAL SUPPORT (OUTPATIENT)
Dept: SMOKING CESSATION | Facility: CLINIC | Age: 64
End: 2024-10-17
Payer: COMMERCIAL

## 2024-10-17 DIAGNOSIS — F17.200 NICOTINE DEPENDENCE: Primary | ICD-10-CM

## 2024-10-17 PROCEDURE — 99999 PR PBB SHADOW E&M-EST. PATIENT-LVL I: CPT | Mod: PBBFAC,,,

## 2024-10-17 PROCEDURE — 99407 BEHAV CHNG SMOKING > 10 MIN: CPT | Mod: S$GLB,,,

## 2024-10-17 NOTE — PROGRESS NOTES
Spoke with patient today in regard to smoking cessation progress for 3 month telephone follow up, she states not tobacco free. Patient states no interest in returning to the program at this time due to receiving a bill. Informed patient of benefit period, future follow ups, EOB, and contact information if any further help or support is needed. Will complete smart form for 3 month follow up on Quit attempt #1.

## 2024-11-04 NOTE — ANESTHESIA PAT ROS NOTE
11/04/2024  Aileen Alegria is a 64 y.o., female.      Pre-op Assessment          Review of Systems  Anesthesia Hx:  No problems with previous Anesthesia   History of prior surgery of interest to airway management or planning:  Previous anesthesia: General COLONOSCOPY  1/3/24 C- LAMINOPLASTY, 2 OR MORE  7/6/20 with general anesthesia.        Denies Family Hx of Anesthesia complications.    Denies Personal Hx of Anesthesia complications.                    Social:  Social Alcohol Use, Smoker       Hematology/Oncology:  Hematology Normal   Oncology Normal                                   EENT/Dental:   NASAL SEPTAL DEVIATION. HYPERTROPHY OF BOTH INFERIOR NASAL TURBINATES. CHRONIC SINUSITIS.           Cardiovascular:            Denies Angina.     hyperlipidemia       Functional Capacity good / => 4 METS                         Pulmonary:    Asthma   Denies Shortness of breath.  Denies Recent URI.                 Renal/:  Renal/ Normal                 Hepatic/GI:  Hepatic/GI Normal                    Musculoskeletal:          Cervical Spine Disorder, Cervical Disc Disease     Neurological:  Neurology Normal                                      Endocrine:  Endocrine Normal Denies Diabetes.           Psych:  Psychiatric Normal                         Anesthesia Assessment: Preoperative EQUATION    Planned Procedure: Procedure(s) (LRB):  SEPTOPLASTY, NOSE, WITH NASAL TURBINATE REDUCTION (Left)  Requested Anesthesia Type:General  Surgeon: Hany Stockton MD  Service: ENT  Known or anticipated Date of Surgery:11/21/2024    Surgeon notes: reviewed    Electronic QUestionnaire Assessment completed via nurse interview with patient.        Triage considerations:     The patient has no apparent active cardiac condition (No unstable coronary Syndrome such as severe unstable angina or recent [<1 month] myocardial  infarction, decompensated CHF, severe valvular   disease or significant arrhythmia)    Previous anesthesia records:GETA and No problems    Airway:  Mallampati: III   Mouth Opening: Normal  TM Distance: Normal  Tongue: Large  Neck ROM: Extension Decreased     ETT 07/06/20; 1246; Video laryngoscopy, Stylet; Oral Standard; No; 7 mm; Cuffed; Auscultation, Capnometry, Symmetrical chest wall movement, Palpation of cuff; Pink tape; Other (Comment) (Upton size 3); 3; 1; Planned extubation       Last PCP note: 3-6 months ago , within [facility]   Subspecialty notes: Allergy, ENT    Other important co-morbidities: HLD and Smoker      Tests already available:  No recent tests.             Instructions given. (See in Nurse's note)    Optimization:  Anesthesia Preop Clinic Assessment  not Indicated    Medical Opinion not Indicated             Plan:    Testing:  None Needed        Patient  has previously scheduled Medical Appointment: NOT AT THIS TIME    Navigation: Straight Line to surgery.               No tests, anesthesia preop clinic visit, or consult required.

## 2024-11-19 NOTE — DISCHARGE INSTRUCTIONS
INSTRUCTIONS TO FOLLOW AFTER SINUS AND NASAL SURGERY  DR. BARTON - [facility]    Office hours:  Weekdays 8:00 am to 5:00 pm.  Please call [phone] and ask to speak with his nurse, Lesly.    After-hours & weekends:  Please call [phone] and ask to speak with the ENT resident doctor.    Your first office visit with Dr. Stockton after surgery should have been already scheduled.  If you dont know when it is, call Dr. Stockton's nurse Lesly at [phone].    Please call IMMMEDIATELY if you have:  Temperature of 101° F or greater  Any unusual, painful swelling  Any active bleeding that saturates more than a 4x4 gauze  Any thick drainage green or yellow drainage  Changes in vision or swelling around the eye  Pain not relieved by your prescribed pain medication    ACTIVITY:    Sleep on your back with the head of the bead elevated, up on 2-3 pillows, or in a recliner for the first 3 to 5 days. This will help with swelling.     After surgery you may have a lot of nasal drainage. This is normal. You may breathe through your nose if youre able but avoid inhaling forcibly. Let all drainage fall on your mustache dressing and change it as needed.    You may wake up after surgery with thick white stockings on. Wear them until you are walking around more. It is important to walk around often while at home to keep your blood circulating and prevent blood clots.    If you use CPAP or BiPAP to sleep at night, you should wait at least 48 hours before resuming use.  Dr. Stockton will advise you when it is safe to do this.    You may shower 24 hours after surgery.    RESTRICTED ACTIVITIES AFTER SURGERY:    Do NOT blow your nose until you see Dr. Stockton in clinic. If you have to sneeze or cough, do so with your mouth open.     AVOID all heavy lifting, straining or bending for 2 weeks.     AVOID any sexual activity for 2 weeks after surgery.    AVOID semi-contact sports or vigorous exercising for 3-4 weeks. Dr. Stockton will let you know  when you are cleared to resume exercise.    AVOID flying or swimming for 2 weeks.      Do NOT operate a motor vehicle or any type of heavy machinery within 24 hours of taking pain medication.    DO NOT smoke or be around smokers.    AVOID irritating substances that might make you sneeze, such as dust, chalk, harsh chemicals, and allergic triggers.  This might also include spicy foods.    DRESSINGS:    Change the gauze mustache dressing under your nose as needed. (If unsure what this dressing is or how to do this, ask your doctor or nurse before you leave the hospital.) You may have pinkish-red drainage for 2-3 days.    Usually there is no gauze packing placed inside the nose.  If packing is necessary, you will be informed by your surgeon.  Do not touch or pull at the packing. The packing will be removed by your doctor at your first visit after surgery.     You may also have a dissolvable stent or dissolvable sponge placed into the sinuses during surgery.  These usually do not need to be removed unless you are told otherwise by Dr. Stockton.  You may notice small fragments of these items come out of your nose in the weeks following surgery.    MEDICATIONS:    After surgery, you will be sent home with prescriptions for pain medication and an anti-nausea medication.  Antibiotics are usually not necessary.    Most people need pain medication for the first few days after surgery, although a narcotic is rarely necessary.  The best pain control comes from a combination of acetaminophen (Tylenol) and ibuprofen (Motrin).  You will be given prescriptions for these at the recommended dose.  These can be alternated so that you are taking something every 2 or 3 hours.    Some people have problems with bowel movements after surgery. If you have NOT had a bowel movement 3-5 days after surgery, go to your local pharmacy and purchase an over the counter stool softener such as COLACE. You can also ask the pharmacist for his or her  recommendation. If you still do not have a bowel movement after starting the softener, please call the office.    You will need these over-the-counter medications after surgery:    SALINE SINUS RINSE ([employer] brand):  You will use this to rinse out your nose and sinuses after surgery.  Begin doing this the day after surgery, unless instructed otherwise by Dr. Stockton.  You should do this 2 times a day, following the instructions on the box.  AFRIN (regular strength): Only use if you have nasal congestion or bleeding. Use 2 times per day for 3 days, stop for 1 day, continue 2 times per day for 3 days, then stop completely.  NOTE:  You may not need to do this at all.    DIET:    Avoid hot and spicy foods for 1 week after surgery.    Begin with bland foods the evening after surgery and advance to your regular diet as tolerated.  It is not necessary to take only soft food unless you are recovering from tonsil surgery.    Drink plenty of fluids (water is best).     Avoid alcoholic and caffeinated beverages for 1 week after surgery because they can cause you to become dehydrated.

## 2024-11-20 ENCOUNTER — PATIENT MESSAGE (OUTPATIENT)
Dept: OTOLARYNGOLOGY | Facility: CLINIC | Age: 64
End: 2024-11-20
Payer: MEDICARE

## 2024-11-20 ENCOUNTER — ANESTHESIA EVENT (OUTPATIENT)
Dept: SURGERY | Facility: HOSPITAL | Age: 64
End: 2024-11-20
Payer: MEDICARE

## 2024-11-20 DIAGNOSIS — J34.2 NASAL SEPTAL DEVIATION: Primary | ICD-10-CM

## 2024-11-20 NOTE — ANESTHESIA PREPROCEDURE EVALUATION
[facility]  Anesthesia Pre-Operative Evaluation         Patient Name: Aileen Alegria  YOB: 1960  MRN: [id number]    SUBJECTIVE:     Pre-operative evaluation for Procedure(s) (LRB):  SEPTOPLASTY, NOSE, WITH NASAL TURBINATE REDUCTION (Left)     11/20/2024    Aileen Alegria is a 64 y.o. female w/ a significant PMHx of smoking, asthma, T2DM (unmedicated), HTN (unmedicated), HLD, cervical spine surgery.    Patient now presents for the above procedure(s).    LDA: None documented.     Prev airway: None documented.    Drips: None documented.    Patient Active Problem List   Diagnosis    Decreased range of motion of left shoulder    Decreased strength    Cigarette nicotine dependence without complication    Rotator cuff tear arthropathy of left shoulder    Environmental and seasonal allergies    H/O cervical spine surgery    Type 2 diabetes mellitus without complication, without long-term current use of insulin    Hyperlipidemia LDL goal <100    Allergic rhinitis due to house dust mite    Bronchitis    Chronic right shoulder pain    10 year risk of MI or stroke 7.5% or greater    Encounter for smoking cessation counseling    Cervical myelopathy    Moderate persistent asthma without complication    Asthma    Muscle spasm    Acute pain of right shoulder     Review of patient's allergies indicates:   Allergen Reactions    Amoxicillin Swelling    Diphenhydramine hcl Anaphylaxis       Current Inpatient Medications:    No current facility-administered medications on file prior to encounter.     Current Outpatient Medications on File Prior to Encounter   Medication Sig Dispense Refill    albuterol (ACCUNEB) 1.25 mg/3 mL Nebu USE 1 VIAL PER NEBULIZER EVERY 6 HOURS AS NEEDED FOR SHORTNESS OF BREATH OR WHEEZING 225 mL 1    albuterol (VENTOLIN HFA) 90 mcg/actuation inhaler Inhale 2 puffs into the lungs every 6 (six) hours as needed for Wheezing or Shortness of Breath. Rescue 18 g 2    atorvastatin  (LIPITOR) 10 MG tablet Take 1 tablet (10 mg total) by mouth every evening. 90 tablet 3    baclofen (LIORESAL) 10 MG tablet Take 1 tablet (10 mg total) by mouth 3 (three) times daily as needed. 30 tablet 0    budesonide-formoterol 160-4.5 mcg (SYMBICORT) 160-4.5 mcg/actuation HFAA Inhale 2 puffs into the lungs every 12 (twelve) hours. 20.4 g 6    camphor-menthoL 0.2-3.5 % Gel Apply 1 application topically 2 (two) times daily as needed (pain). 113.4 g 3    cetirizine (ZYRTEC) 10 MG tablet Take 1 tablet (10 mg total) by mouth once daily. 90 tablet 3    diclofenac sodium (VOLTAREN ARTHRITIS PAIN) 1 % Gel Apply 2 g topically 4 (four) times daily as needed (joint pain). 200 g 1    DULoxetine (CYMBALTA) 30 MG capsule Take 1 capsule (30 mg total) by mouth once daily. 30 capsule 11    fluticasone (FLONASE SENSIMIST) 27.5 mcg/actuation nasal spray 2 sprays by Nasal route once daily. 9.1 mL 11    ketorolac 0.5% (ACULAR) 0.5 % Drop Place 1 drop into both eyes 4 (four) times daily as needed (eye itching). 10 mL 1    levocetirizine (XYZAL) 5 MG tablet Take 1 tablet (5 mg total) by mouth every evening. 90 tablet 3    loratadine (CLARITIN) 10 mg tablet Take 1 tablet (10 mg total) by mouth daily as needed. 90 tablet 3    mometasone (NASONEX) 50 mcg/actuation nasal spray 2 sprays by Nasal route 2 (two) times daily. 2 each 5    montelukast (SINGULAIR) 10 mg tablet Take 1 tablet (10 mg total) by mouth every evening. 30 tablet 11       Past Surgical History:   Procedure Laterality Date     SECTION      COLONOSCOPY N/A 1/3/2024    Procedure: COLONOSCOPY;  Surgeon: Ike Agarwal MD;  Location: [address]);  Service: Endoscopy;  Laterality: N/A;  inst. to pt. portal. Tbou  Referral: Prabha Mancilla NP  -precall complete-MS    NECK SURGERY  2020    C 2-C7     Social History     Socioeconomic History    Marital status: Single   Tobacco Use    Smoking status: Every Day     Current packs/day: 1.00     Average  packs/day: 1.2 packs/day for 40.9 years (47.9 ttl pk-yrs)     Types: Cigarettes, Vaping with nicotine     Start date: 1984    Smokeless tobacco: Never   Substance and Sexual Activity    Alcohol use: Yes     Comment: socially    Drug use: Never    Sexual activity: Not Currently     Social Drivers of Health     Financial Resource Strain: Low Risk  (2/25/2022)    Overall Financial Resource Strain (CARDIA)     Difficulty of Paying Living Expenses: Not hard at all   Food Insecurity: No Food Insecurity (2/25/2022)    Hunger Vital Sign     Worried About Running Out of Food in the Last Year: Never true     Ran Out of Food in the Last Year: Never true   Transportation Needs: No Transportation Needs (2/25/2022)    PRAPARE - Transportation     Lack of Transportation (Medical): No     Lack of Transportation (Non-Medical): No   Physical Activity: Insufficiently Active (6/4/2021)    Exercise Vital Sign     Days of Exercise per Week: 3 days     Minutes of Exercise per Session: 30 min   Stress: No Stress Concern Present (2/25/2022)    [employer] - Occupational Stress Questionnaire     Feeling of Stress : Only a little   Housing Stability: Low Risk  (2/25/2022)    Housing Stability Vital Sign     Unable to Pay for Housing in the Last Year: No     Number of Places Lived in the Last Year: 1     Unstable Housing in the Last Year: No       OBJECTIVE:     Vital Signs Range (Last 24H):         Significant Labs:  Lab Results   Component Value Date    WBC 8.27 05/28/2021    HGB 12.9 05/28/2021    HCT 39.6 05/28/2021     05/28/2021    CHOL 190 05/30/2023    TRIG 65 05/30/2023    HDL 45 05/30/2023    ALT 9 (L) 06/07/2022    AST 12 06/07/2022     06/07/2022    K 3.9 06/07/2022     06/07/2022    CREATININE 0.9 08/03/2023    BUN 8 06/07/2022    CO2 27 06/07/2022    TSH 1.240 05/30/2023    HGBA1C 6.7 (H) 05/30/2023       Diagnostic Studies: No relevant studies.    EKG:   No results found for this  or any previous visit.    2D ECHO:  TTE:  No results found for this or any previous visit.    LUIS:  No results found for this or any previous visit.    ASSESSMENT/PLAN:           Pre-op Assessment    I have reviewed the Patient Summary Reports.     I have reviewed the Nursing Notes. I have reviewed the NPO Status.   I have reviewed the Medications.     Review of Systems  Anesthesia Hx:  No problems with previous Anesthesia   History of prior surgery of interest to airway management or planning: cervical fusion.         Denies Family Hx of Anesthesia complications.    Denies Personal Hx of Anesthesia complications.                    Social:  Smoker, No Alcohol Use       Hematology/Oncology:       -- Denies Anemia:                                  Cardiovascular:     Hypertension (unmedicated), poorly controlled   Denies MI.  Denies CAD.        Denies CHF.    hyperlipidemia                               Pulmonary:    Denies COPD. Asthma mild      Asthma:               Hepatic/GI:     GERD, well controlled                Musculoskeletal:     H/o cervical fusion       Spine Disorders: cervical            Neurological:    Denies CVA.    Denies Seizures.                                Endocrine:  Diabetes (unmedicated), type 2    Diabetes                          Physical Exam  General: Well nourished, Alert and Oriented    Airway:  Mallampati: II   Mouth Opening: Normal  TM Distance: Normal  Tongue: Normal  Neck ROM: Extension Decreased, Flexion Decreased    Dental:        Anesthesia Plan  Type of Anesthesia, risks & benefits discussed:    Anesthesia Type: Gen ETT  Intra-op Monitoring Plan: Standard ASA Monitors  Post Op Pain Control Plan: multimodal analgesia and IV/PO Opioids PRN  Induction:  IV  Airway Plan: Direct and Video, Post-Induction  Informed Consent: Informed consent signed with the Patient and all parties understand the risks and agree with anesthesia plan.  All questions answered.   ASA Score: 3  Day of  Surgery Review of History & Physical: H&P Update referred to the surgeon/provider.    Ready For Surgery From Anesthesia Perspective.     .

## 2024-11-21 ENCOUNTER — ANESTHESIA (OUTPATIENT)
Dept: SURGERY | Facility: HOSPITAL | Age: 64
End: 2024-11-21
Payer: MEDICARE

## 2024-11-21 ENCOUNTER — HOSPITAL ENCOUNTER (OUTPATIENT)
Facility: HOSPITAL | Age: 64
Discharge: HOME | End: 2024-11-21
Attending: STUDENT IN AN ORGANIZED HEALTH CARE EDUCATION/TRAINING PROGRAM | Admitting: STUDENT IN AN ORGANIZED HEALTH CARE EDUCATION/TRAINING PROGRAM
Payer: MEDICARE

## 2024-11-21 VITALS
TEMPERATURE: 98 F | DIASTOLIC BLOOD PRESSURE: 95 MMHG | SYSTOLIC BLOOD PRESSURE: 192 MMHG | RESPIRATION RATE: 18 BRPM | WEIGHT: 140 LBS | BODY MASS INDEX: 24.03 KG/M2 | HEART RATE: 86 BPM | OXYGEN SATURATION: 95 %

## 2024-11-21 DIAGNOSIS — J34.89 NASAL OBSTRUCTION: Primary | ICD-10-CM

## 2024-11-21 PROCEDURE — 25000242 PHARM REV CODE 250 ALT 637 W/ HCPCS

## 2024-11-21 PROCEDURE — 71000044 HC DOSC ROUTINE RECOVERY FIRST HOUR: Performed by: STUDENT IN AN ORGANIZED HEALTH CARE EDUCATION/TRAINING PROGRAM

## 2024-11-21 PROCEDURE — 37000008 HC ANESTHESIA 1ST 15 MINUTES: Performed by: STUDENT IN AN ORGANIZED HEALTH CARE EDUCATION/TRAINING PROGRAM

## 2024-11-21 PROCEDURE — 63600175 PHARM REV CODE 636 W HCPCS: Performed by: STUDENT IN AN ORGANIZED HEALTH CARE EDUCATION/TRAINING PROGRAM

## 2024-11-21 PROCEDURE — 71000015 HC POSTOP RECOV 1ST HR: Performed by: STUDENT IN AN ORGANIZED HEALTH CARE EDUCATION/TRAINING PROGRAM

## 2024-11-21 PROCEDURE — 63600175 PHARM REV CODE 636 W HCPCS

## 2024-11-21 PROCEDURE — 27201423 OPTIME MED/SURG SUP & DEVICES STERILE SUPPLY: Performed by: STUDENT IN AN ORGANIZED HEALTH CARE EDUCATION/TRAINING PROGRAM

## 2024-11-21 PROCEDURE — 36000706: Performed by: STUDENT IN AN ORGANIZED HEALTH CARE EDUCATION/TRAINING PROGRAM

## 2024-11-21 PROCEDURE — 25000003 PHARM REV CODE 250

## 2024-11-21 PROCEDURE — 36000707: Performed by: STUDENT IN AN ORGANIZED HEALTH CARE EDUCATION/TRAINING PROGRAM

## 2024-11-21 PROCEDURE — 94640 AIRWAY INHALATION TREATMENT: CPT

## 2024-11-21 PROCEDURE — 30520 REPAIR OF NASAL SEPTUM: CPT | Mod: ,,, | Performed by: STUDENT IN AN ORGANIZED HEALTH CARE EDUCATION/TRAINING PROGRAM

## 2024-11-21 PROCEDURE — 25000003 PHARM REV CODE 250: Performed by: STUDENT IN AN ORGANIZED HEALTH CARE EDUCATION/TRAINING PROGRAM

## 2024-11-21 PROCEDURE — 37000009 HC ANESTHESIA EA ADD 15 MINS: Performed by: STUDENT IN AN ORGANIZED HEALTH CARE EDUCATION/TRAINING PROGRAM

## 2024-11-21 PROCEDURE — 30140 RESECT INFERIOR TURBINATE: CPT | Mod: 50,51,, | Performed by: STUDENT IN AN ORGANIZED HEALTH CARE EDUCATION/TRAINING PROGRAM

## 2024-11-21 RX ORDER — MIDAZOLAM HYDROCHLORIDE 1 MG/ML
INJECTION INTRAMUSCULAR; INTRAVENOUS
Status: DISCONTINUED | OUTPATIENT
Start: 2024-11-21 | End: 2024-11-21

## 2024-11-21 RX ORDER — DEXMEDETOMIDINE HYDROCHLORIDE 100 UG/ML
INJECTION, SOLUTION INTRAVENOUS
Status: DISCONTINUED | OUTPATIENT
Start: 2024-11-21 | End: 2024-11-21

## 2024-11-21 RX ORDER — LIDOCAINE HYDROCHLORIDE AND EPINEPHRINE 10; 10 UG/ML; MG/ML
INJECTION, SOLUTION INFILTRATION; PERINEURAL
Status: DISCONTINUED | OUTPATIENT
Start: 2024-11-21 | End: 2024-11-21 | Stop reason: HOSPADM

## 2024-11-21 RX ORDER — DEXAMETHASONE SODIUM PHOSPHATE 4 MG/ML
INJECTION, SOLUTION INTRA-ARTICULAR; INTRALESIONAL; INTRAMUSCULAR; INTRAVENOUS; SOFT TISSUE
Status: DISCONTINUED | OUTPATIENT
Start: 2024-11-21 | End: 2024-11-21

## 2024-11-21 RX ORDER — FENTANYL CITRATE 50 UG/ML
INJECTION, SOLUTION INTRAMUSCULAR; INTRAVENOUS
Status: DISCONTINUED | OUTPATIENT
Start: 2024-11-21 | End: 2024-11-21

## 2024-11-21 RX ORDER — ALBUTEROL SULFATE 2.5 MG/.5ML
2.5 SOLUTION RESPIRATORY (INHALATION) ONCE AS NEEDED
Status: COMPLETED | OUTPATIENT
Start: 2024-11-21 | End: 2024-11-21

## 2024-11-21 RX ORDER — PROPOFOL 10 MG/ML
VIAL (ML) INTRAVENOUS
Status: DISCONTINUED | OUTPATIENT
Start: 2024-11-21 | End: 2024-11-21

## 2024-11-21 RX ORDER — CEFAZOLIN SODIUM 1 G/3ML
INJECTION, POWDER, FOR SOLUTION INTRAMUSCULAR; INTRAVENOUS
Status: DISCONTINUED | OUTPATIENT
Start: 2024-11-21 | End: 2024-11-21

## 2024-11-21 RX ORDER — HYDROMORPHONE HYDROCHLORIDE 1 MG/ML
0.2 INJECTION, SOLUTION INTRAMUSCULAR; INTRAVENOUS; SUBCUTANEOUS EVERY 5 MIN PRN
Status: DISCONTINUED | OUTPATIENT
Start: 2024-11-21 | End: 2024-11-21 | Stop reason: HOSPADM

## 2024-11-21 RX ORDER — BACITRACIN ZINC 500 UNIT/G
OINTMENT (GRAM) TOPICAL
Status: DISCONTINUED | OUTPATIENT
Start: 2024-11-21 | End: 2024-11-21 | Stop reason: HOSPADM

## 2024-11-21 RX ORDER — ONDANSETRON HYDROCHLORIDE 2 MG/ML
4 INJECTION, SOLUTION INTRAVENOUS DAILY PRN
Status: DISCONTINUED | OUTPATIENT
Start: 2024-11-21 | End: 2024-11-21 | Stop reason: HOSPADM

## 2024-11-21 RX ORDER — VITAMIN A 3000 MCG
2 CAPSULE ORAL EVERY 4 HOURS
Qty: 44 ML | Refills: 12 | Status: SHIPPED | OUTPATIENT
Start: 2024-11-21

## 2024-11-21 RX ORDER — ONDANSETRON 4 MG/1
4 TABLET, ORALLY DISINTEGRATING ORAL EVERY 8 HOURS PRN
Qty: 20 TABLET | Refills: 0 | Status: SHIPPED | OUTPATIENT
Start: 2024-11-21

## 2024-11-21 RX ORDER — LIDOCAINE HYDROCHLORIDE 20 MG/ML
INJECTION, SOLUTION EPIDURAL; INFILTRATION; INTRACAUDAL; PERINEURAL
Status: DISCONTINUED | OUTPATIENT
Start: 2024-11-21 | End: 2024-11-21

## 2024-11-21 RX ORDER — OXYCODONE HYDROCHLORIDE 5 MG/1
5 TABLET ORAL
Status: DISCONTINUED | OUTPATIENT
Start: 2024-11-21 | End: 2024-11-21 | Stop reason: HOSPADM

## 2024-11-21 RX ORDER — GLUCAGON 1 MG
1 KIT INJECTION
Status: DISCONTINUED | OUTPATIENT
Start: 2024-11-21 | End: 2024-11-21 | Stop reason: HOSPADM

## 2024-11-21 RX ORDER — SODIUM CHLORIDE 0.9 % (FLUSH) 0.9 %
10 SYRINGE (ML) INJECTION
Status: DISCONTINUED | OUTPATIENT
Start: 2024-11-21 | End: 2024-11-21 | Stop reason: HOSPADM

## 2024-11-21 RX ORDER — ONDANSETRON HYDROCHLORIDE 2 MG/ML
INJECTION, SOLUTION INTRAVENOUS
Status: DISCONTINUED | OUTPATIENT
Start: 2024-11-21 | End: 2024-11-21

## 2024-11-21 RX ORDER — OXYMETAZOLINE HCL 0.05 %
SPRAY, NON-AEROSOL (ML) NASAL
Status: DISCONTINUED | OUTPATIENT
Start: 2024-11-21 | End: 2024-11-21 | Stop reason: HOSPADM

## 2024-11-21 RX ORDER — PHENYLEPHRINE HYDROCHLORIDE 10 MG/ML
INJECTION INTRAVENOUS
Status: DISCONTINUED | OUTPATIENT
Start: 2024-11-21 | End: 2024-11-21

## 2024-11-21 RX ORDER — EPINEPHRINE 1 MG/ML
INJECTION INTRAMUSCULAR; INTRAVENOUS; SUBCUTANEOUS
Status: DISCONTINUED | OUTPATIENT
Start: 2024-11-21 | End: 2024-11-21 | Stop reason: HOSPADM

## 2024-11-21 RX ORDER — PHENYLEPHRINE HCL IN 0.9% NACL 1 MG/10 ML
SYRINGE (ML) INTRAVENOUS
Status: DISCONTINUED | OUTPATIENT
Start: 2024-11-21 | End: 2024-11-21

## 2024-11-21 RX ORDER — ROCURONIUM BROMIDE 10 MG/ML
INJECTION, SOLUTION INTRAVENOUS
Status: DISCONTINUED | OUTPATIENT
Start: 2024-11-21 | End: 2024-11-21

## 2024-11-21 RX ORDER — CEPHALEXIN 500 MG/1
500 CAPSULE ORAL EVERY 6 HOURS
Qty: 40 CAPSULE | Refills: 0 | Status: SHIPPED | OUTPATIENT
Start: 2024-11-21 | End: 2024-12-01

## 2024-11-21 RX ADMIN — MIDAZOLAM HYDROCHLORIDE 2 MG: 2 INJECTION, SOLUTION INTRAMUSCULAR; INTRAVENOUS at 11:11

## 2024-11-21 RX ADMIN — DEXMEDETOMIDINE 8 MCG: 100 INJECTION, SOLUTION, CONCENTRATE INTRAVENOUS at 11:11

## 2024-11-21 RX ADMIN — ONDANSETRON 4 MG: 2 INJECTION INTRAMUSCULAR; INTRAVENOUS at 12:11

## 2024-11-21 RX ADMIN — PHENYLEPHRINE HYDROCHLORIDE 100 MCG: 10 INJECTION INTRAVENOUS at 11:11

## 2024-11-21 RX ADMIN — ROCURONIUM BROMIDE 20 MG: 10 INJECTION, SOLUTION INTRAVENOUS at 11:11

## 2024-11-21 RX ADMIN — PROPOFOL 130 MG: 10 INJECTION, EMULSION INTRAVENOUS at 11:11

## 2024-11-21 RX ADMIN — Medication 100 MCG: at 11:11

## 2024-11-21 RX ADMIN — DEXMEDETOMIDINE 4 MCG: 100 INJECTION, SOLUTION, CONCENTRATE INTRAVENOUS at 12:11

## 2024-11-21 RX ADMIN — FENTANYL CITRATE 25 MCG: 50 INJECTION, SOLUTION INTRAMUSCULAR; INTRAVENOUS at 11:11

## 2024-11-21 RX ADMIN — FENTANYL CITRATE 50 MCG: 50 INJECTION, SOLUTION INTRAMUSCULAR; INTRAVENOUS at 11:11

## 2024-11-21 RX ADMIN — SODIUM CHLORIDE: 0.9 INJECTION, SOLUTION INTRAVENOUS at 11:11

## 2024-11-21 RX ADMIN — DEXAMETHASONE SODIUM PHOSPHATE 12 MG: 4 INJECTION, SOLUTION INTRAMUSCULAR; INTRAVENOUS at 11:11

## 2024-11-21 RX ADMIN — ROCURONIUM BROMIDE 50 MG: 10 INJECTION, SOLUTION INTRAVENOUS at 11:11

## 2024-11-21 RX ADMIN — LIDOCAINE HYDROCHLORIDE 50 MG: 20 INJECTION, SOLUTION EPIDURAL; INFILTRATION; INTRACAUDAL; PERINEURAL at 11:11

## 2024-11-21 RX ADMIN — PHENYLEPHRINE HYDROCHLORIDE 200 MCG: 10 INJECTION INTRAVENOUS at 11:11

## 2024-11-21 RX ADMIN — PROPOFOL 125 MCG/KG/MIN: 10 INJECTION, EMULSION INTRAVENOUS at 11:11

## 2024-11-21 RX ADMIN — ALBUTEROL SULFATE 2.5 MG: 2.5 SOLUTION RESPIRATORY (INHALATION) at 10:11

## 2024-11-21 RX ADMIN — FENTANYL CITRATE 25 MCG: 50 INJECTION, SOLUTION INTRAMUSCULAR; INTRAVENOUS at 12:11

## 2024-11-21 RX ADMIN — SUGAMMADEX 200 MG: 100 INJECTION, SOLUTION INTRAVENOUS at 12:11

## 2024-11-21 RX ADMIN — CEFAZOLIN 2 G: 330 INJECTION, POWDER, FOR SOLUTION INTRAMUSCULAR; INTRAVENOUS at 11:11

## 2024-11-21 NOTE — PROGRESS NOTES
"Patient is very defiant and uncooperative with preop assessment. Unable to verify entire med list. Patient stating "It should have been updated a long time ago so you can move on to something else."  "

## 2024-11-21 NOTE — ANESTHESIA POSTPROCEDURE EVALUATION
Anesthesia Post Evaluation    Patient: Aileen Alegria    Procedure(s) Performed: Procedure(s) (LRB):  SEPTOPLASTY, NOSE, WITH NASAL TURBINATE REDUCTION (Left)    Final Anesthesia Type: general      Patient location during evaluation: [facility]  Patient participation: Yes- Able to Participate  Level of consciousness: awake and alert and agitated  Post-procedure vital signs: reviewed and stable  Pain management: adequate  Airway patency: patent    PONV status at discharge: No PONV  Anesthetic complications: no      Cardiovascular status: blood pressure returned to baseline and hypertensive  Respiratory status: unassisted, spontaneous ventilation and room air  Hydration status: euvolemic  Follow-up not needed.              Vitals Value Taken Time   /95 11/21/24 1420   Temp 36.4 °C (97.5 °F) 11/21/24 1304   Pulse 86 11/21/24 1420   Resp 18 11/21/24 1415   SpO2 95 % 11/21/24 1420         No case tracking events are documented in the log.      Pain/Ann Marie Score: Ann Marie Score: 10 (11/21/2024  1:45 PM)

## 2024-11-21 NOTE — H&P
[facility] - Surgery ([facility])  Otorhinolaryngology-Head & Neck Surgery  History & Physical    Patient Name: Aileen Alegria  MRN: [id number]  Admission Date: 11/21/2024    Subjective:     History of Present Illness:  64 y.o. female presents with nasal obstruction , which has been present for over 5 years. She complains of right-greater-than-left nasal obstruction, hyposmia.  Postnasal drainage and thick clear nasal mucus. She denies purulent drainage, previous trauma, vision changes, epistaxis or anosmia. She has been treated with flonase & singulair in the past. She has had allergy testing done in the past and was told she is allergic to ragweed, dust mites & dogs . She denies previous skullbase surgery. She snores but denies any pauses. The assessment of quality and severity of symptoms as measured by the SNOT-22 score is 95 and the STOP-BANG score is 4.       11/21/2024: Presents today for scheduled surgery. See prior clinical notes for further details. Patient/family questions addressed. Patient wishes to proceed with surgery.     No current facility-administered medications on file prior to encounter.     Current Outpatient Medications on File Prior to Encounter   Medication Sig    albuterol (ACCUNEB) 1.25 mg/3 mL Nebu USE 1 VIAL PER NEBULIZER EVERY 6 HOURS AS NEEDED FOR SHORTNESS OF BREATH OR WHEEZING    albuterol (VENTOLIN HFA) 90 mcg/actuation inhaler Inhale 2 puffs into the lungs every 6 (six) hours as needed for Wheezing or Shortness of Breath. Rescue    budesonide-formoterol 160-4.5 mcg (SYMBICORT) 160-4.5 mcg/actuation HFAA Inhale 2 puffs into the lungs every 12 (twelve) hours.    cetirizine (ZYRTEC) 10 MG tablet Take 1 tablet (10 mg total) by mouth once daily.    levocetirizine (XYZAL) 5 MG tablet Take 1 tablet (5 mg total) by mouth every evening.    montelukast (SINGULAIR) 10 mg tablet Take 1 tablet (10 mg total) by mouth every evening.    atorvastatin (LIPITOR) 10 MG tablet Take 1 tablet (10 mg total) by  mouth every evening.    baclofen (LIORESAL) 10 MG tablet Take 1 tablet (10 mg total) by mouth 3 (three) times daily as needed.    camphor-menthoL 0.2-3.5 % Gel Apply 1 application topically 2 (two) times daily as needed (pain).    diclofenac sodium (VOLTAREN ARTHRITIS PAIN) 1 % Gel Apply 2 g topically 4 (four) times daily as needed (joint pain).    DULoxetine (CYMBALTA) 30 MG capsule Take 1 capsule (30 mg total) by mouth once daily.    fluticasone (FLONASE SENSIMIST) 27.5 mcg/actuation nasal spray 2 sprays by Nasal route once daily.    ketorolac 0.5% (ACULAR) 0.5 % Drop Place 1 drop into both eyes 4 (four) times daily as needed (eye itching).    loratadine (CLARITIN) 10 mg tablet Take 1 tablet (10 mg total) by mouth daily as needed.    mometasone (NASONEX) 50 mcg/actuation nasal spray 2 sprays by Nasal route 2 (two) times daily.       Review of patient's allergies indicates:   Allergen Reactions    Amoxicillin Swelling    Diphenhydramine hcl Anaphylaxis       Past Medical History:   Diagnosis Date    Allergy     Environmental and seasonal allergies     Moderate persistent asthma without complication 2022     Past Surgical History:   Procedure Laterality Date     SECTION      COLONOSCOPY N/A 1/3/2024    Procedure: COLONOSCOPY;  Surgeon: Ike Agarwal MD;  Location: [address];  Service: Endoscopy;  Laterality: N/A;  inst. to pt. portal. Tb  Referral: Prabha Mancilla NP  -precall complete-MS    NECK SURGERY  2020    C 2-C7     Family History       Problem Relation (Age of Onset)    Asthma Daughter, Son    Dementia Father    Hypertension Father, Sister    Lung cancer Mother    No Known Problems Brother, Maternal Grandmother, Maternal Grandfather, Paternal Grandmother, Paternal Grandfather    Prostate cancer Father          Tobacco Use    Smoking status: Every Day     Current packs/day: 1.00     Average packs/day: 1.2 packs/day for 40.9 years (47.9 ttl pk-yrs)     Types: Cigarettes,  Vaping with nicotine     Start date: 1984    Smokeless tobacco: Never   Substance and Sexual Activity    Alcohol use: Yes     Comment: socially    Drug use: Never    Sexual activity: Not Currently     Review of Systems  ENT-focused review of systems performed with pertinent positives as noted in HPI  Objective:     Vital Signs (Most Recent):  Temp: 98.2 °F (36.8 °C) (11/21/24 0924)  Pulse: 72 (11/21/24 1013)  Resp: 16 (11/21/24 1013)  BP: (!) 196/96 (11/21/24 0924)  SpO2: 100 % (11/21/24 1013) Vital Signs (24h Range):  Temp:  [98.2 °F (36.8 °C)] 98.2 °F (36.8 °C)  Pulse:  [72-76] 72  Resp:  [16] 16  SpO2:  [100 %] 100 %  BP: (196)/(96) 196/96     Weight: 63.5 kg (140 lb)  Body mass index is 24.03 kg/m².      NAD  Awake and alert  Auricles WNL AU  Nose w/ normal external appearance;right septal deviation  Normal WOB, no stridor or stertor      Assessment/Plan:   Aileen Alegria is a 64 y.o. female with:  Septal deviation and nasal obstruction    The patient has been examined and the H&P has been reviewed. I concur with the findings as noted above and no significant changes have occurred since most recent clinical visit.  Surgery risks, benefits and alternative options discussed and understood by patient/family.    Proceed to OR for surgery SEPTOPLASTY, NOSE, WITH NASAL TURBINATE REDUCTION     - Consent obtained  - Postoperative instructions/medications reviewed  - Follow-up will be coordinated with ENT clinic       Vincent Avila MD  Otorhinolaryngology-Head & Neck Surgery  [facility] - [facility] (2nd Fl)

## 2024-11-21 NOTE — ANESTHESIA PROCEDURE NOTES
Intubation    Date/Time: 11/21/2024 11:29 AM    Performed by: Addison Gregory MD  Authorized by: Haven Shaikh MD    Intubation:     Induction:  Intravenous    Mask Ventilation:  Easy with oral airway    Attempts:  1    Attempted By:  Resident anesthesiologist    Method of Intubation:  Direct    Blade:  Arturo 3    Laryngeal View Grade: Grade IIA - cords partially seen      Difficult Airway Encountered?: No      Complications:  None    Airway Device:  Oral endotracheal tube    Airway Device Size:  7.0    Style/Cuff Inflation:  Cuffed (inflated to minimal occlusive pressure)    Inflation Amount (mL):  6    Tube secured:  21    Secured at:  The lips    Placement Verified By:  Capnometry    Complicating Factors:  Poor neck/head extension    Findings Post-Intubation:  BS equal bilateral

## 2024-11-21 NOTE — TRANSFER OF CARE
Anesthesia Transfer of Care Note    Patient: Aileen Alegria    Procedure(s) Performed: Procedure(s) (LRB):  SEPTOPLASTY, NOSE, WITH NASAL TURBINATE REDUCTION (Left)    Patient location: PACU    Anesthesia Type: general    Transport from OR: Transported from OR on 2-3 L/min O2 by NC with adequate spontaneous ventilation    Post pain: adequate analgesia    Post assessment: no apparent anesthetic complications    Post vital signs: stable    Level of consciousness: awake and alert    Nausea/Vomiting: no nausea/vomiting    Complications: none    Transfer of care protocol was followed      Last vitals: Visit Vitals  BP (!) 196/96 (BP Location: Left arm, Patient Position: Lying)   Pulse 72   Temp 36.8 °C (98.2 °F) (Temporal)   Resp 16   Wt 63.5 kg (140 lb)   SpO2 100%   Breastfeeding No   BMI 24.03 kg/m²

## 2024-11-21 NOTE — H&P
[facility] - [facility] (2nd Fl)  Brief Operative Note/Discharge Note    Surgery Date: 11/21/2024     Surgeons and Role:     * Hany Stockton MD - Primary     * Vincent Avila MD - Resident - Assisting    Procedure(s) (LRB):  Procedure(s):  SEPTOPLASTY, NOSE, WITH NASAL TURBINATE REDUCTION    Pre-op Diagnosis:  Nasal septal deviation [J34.2]  Hypertrophy of both inferior nasal turbinates [J34.3]  Chronic sinusitis, unspecified location [J32.9]    Post-op Diagnosis:  Post-Op Diagnosis Codes:     * Nasal septal deviation [J34.2]     * Hypertrophy of both inferior nasal turbinates [J34.3]     * Chronic sinusitis, unspecified location [J32.9]    Procedure(s) (LRB):  SEPTOPLASTY, NOSE, WITH NASAL TURBINATE REDUCTION (Left)    Anesthesia: General    Operative Findings: See full op note for full details - septoplasty, turbinate reduction    Estimated Blood Loss: Minimal <30cc           Specimens:   Specimens (From admission, onward)      None            Implants:  * No implants in log *    Discharge Note    OUTCOME: Patient tolerated treatment/procedure well without complication and is now ready for discharge.    DISPOSITION: Home or Self Care, discharged in good condition    PREOPERATIVE DIAGNOSIS: Pre-Op Diagnosis Codes:      * Nasal septal deviation [J34.2]     * Hypertrophy of both inferior nasal turbinates [J34.3]     * Chronic sinusitis, unspecified location [J32.9]     FINAL DIAGNOSIS:  same as preop, see above  Post-Op Diagnosis Codes:     * Nasal septal deviation [J34.2]     * Hypertrophy of both inferior nasal turbinates [J34.3]     * Chronic sinusitis, unspecified location [J32.9]    FOLLOWUP: In clinic    DISCHARGE INSTRUCTIONS:  See discharge tab for complete details. Discussed with patient/family preop.    Discharge Procedure Orders   Diet Adult Regular     Lifting restrictions   Order Comments: Nothing >10lbs for 2 weeks or until f/u appt     Other restrictions (specify):   Order Comments: No nose blowing --  sinus/nasal precautions  Keep head/head of bed elevated     No driving until:   Order Comments: Not on narcotic pain medication (at least 24hr after anesthesia)     No dressing needed     Notify your health care provider if you experience any of the following:  persistent nausea and vomiting or diarrhea     Notify your health care provider if you experience any of the following:  severe uncontrolled pain     Notify your health care provider if you experience any of the following:  difficulty breathing or increased cough     Notify your health care provider if you experience any of the following:   Order Comments: persistent bleeding from the nose/mouth (soaking multiple gauze sponges)-- if severe/causing difficulty breathing report to nearest ED/call ambulance       TIME SPENT ON DISCHARGE: 35 minutes

## 2024-11-21 NOTE — PLAN OF CARE
Patient being discharged to home via wheelchair, escorted by her son, Toni. Pt alert and talkative, vitals stable on room air, refuses PO intake. Discharge instructions (written and verbal) and follow-up information given to patient and son. Pt remains hypertensive, but pulling out IV stating she is ready to go and asking for her clothes. Son at bedside. Pt able to dress independently and ambulate to restroom independently.     Meds delivered bedside by Pharmacy.    Pt OK to discharge per Dr. Shaikh w/ Anesthesia.

## 2024-11-21 NOTE — OP NOTE
11/21/2024    PREOPERATIVE DIAGNOSIS(ES):    1.   Septal deviation  2.   Inferior turbinate hypertrophy     POSTOPERATIVE DIAGNOSIS(ES):    1.   Same     PROCEDURE:    1.   Septoplasty.  2.   Bilateral partial excision of the inferior turbinates.     ANESTHESIA:  General endotracheal.     SURGEON(S):  Hany Stockton MD     ASSISTANT:  Vincent Avila MD     ESTIMATED BLOOD LOSS:  20cc.     COMPLICATIONS:  None     FINDINGS:    1.   Large left sided septal deviation with associated septal spur  2.   Hypertrophic inferior turbinates     INDICATIONS FOR PROCEDURE:  This is a 64-year-old female with a history of bilateral nasal obstruction. She was found to have a large septal deviation to the left and hypertrophic inferior turbinates. Nasal obstruction did not improve after medical therapy was initiated.     The risks, benefits, and alternatives of surgery were discussed at length with the patient and include, but are not limited to bleeding, infection, need for revision surgery, injury to the eye or brain, double vision, loss of vision, cerebrospinal fluid leakage, meningitis, chronic sinusitis, nasal obstruction, nasal septal perforation, numbness to teeth or cheek, or need for time and expense off work.  The patient demonstrates understanding and wishes to proceed.  All of her questions were answered to her satisfaction.     DESCRIPTION OF PROCEDURE:  The patient was taken back to the operating room and after successful induction of general anesthesia, was prepped and draped in a sterile fashion.  Afrin pledgets were used intranasally for decongestion, and approximately 10 cc of 1% lidocaine with epinephrine 1:100,000 was used intranasally. A preoperative timeout was taken to confirm the patient and procedure being performed.      Attention was first turned to the right side.  There was a  severe  septal deformity, which was preventing access into the paranasal sinuses.  As such, a septoplasty was performed.  A  right-sided hemitransfixion incision was made and a mucoperichondrial/mucoperiosteal flap was elevated. The bony cartilaginous junction was divided and the bony deviated septum was removed leading to good straightening of the nasal septum.  Once the septum was straight and both nasal cavities could be accessed, the right-sided hemitransfixion incision was closed using a 4-0 chromic gut suture.    Incisions were made in the anterior head of the inferior turbinate using a #15 blade.  A Candie elevator was then tunnelled medially to the turbinate bone and used to segmentally outfracture and morselize the bone.  Then, a 2 mm blade on the powered tissue shaver was tunneled submucosally and used to resect soft tissue of the turbinate while overlying mucosa was preserved.   Finally, the turbinates were outfractured using a Iniguez/Boies elevator.  An identical procedure was performed bilaterally.     At the conclusion of the case, the nose was copiously irrigated with saline solution to remove all debris and both inferior turbinates were outfractured.  Hemostasis was obtained throughout using suction monopolar cautery as well as dilute epinephrine/saline pledgets until hemostasis was achieved. Lanza nasal splints were placed and sutured in using 2-0 nylon.     The patient was then awakened, extubated, and taken to recovery room in stable condition.      I was scrubbed and personally present during the procedure, and I was immediately available throughout the entire procedure.    Hany Stockton MD

## 2024-11-22 RX ORDER — HYDROCODONE BITARTRATE AND ACETAMINOPHEN 5; 325 MG/1; MG/1
1 TABLET ORAL EVERY 6 HOURS PRN
Qty: 16 TABLET | Refills: 0 | Status: SHIPPED | OUTPATIENT
Start: 2024-11-22 | End: 2024-11-26

## 2024-11-26 ENCOUNTER — OFFICE VISIT (OUTPATIENT)
Dept: OTOLARYNGOLOGY | Facility: CLINIC | Age: 64
End: 2024-11-26
Payer: MEDICARE

## 2024-11-26 VITALS
BODY MASS INDEX: 22.44 KG/M2 | SYSTOLIC BLOOD PRESSURE: 141 MMHG | WEIGHT: 130.75 LBS | DIASTOLIC BLOOD PRESSURE: 82 MMHG | HEART RATE: 83 BPM

## 2024-11-26 DIAGNOSIS — Z98.890 S/P NASAL SEPTOPLASTY: Primary | ICD-10-CM

## 2024-11-26 PROCEDURE — 99999 PR PBB SHADOW E&M-EST. PATIENT-LVL III: CPT | Mod: PBBFAC,,, | Performed by: STUDENT IN AN ORGANIZED HEALTH CARE EDUCATION/TRAINING PROGRAM

## 2024-11-26 NOTE — PROGRESS NOTES
POSTOP ST    11/26/2024     I had the pleasure of seeing Aileen Alegria in follow up today.  She recently underwent septoplasty and SMRT on 11/21/24.  The patient complains of bilateral nasal congestion, decreased sense of smell.  The patient's SNOT-22 score was deferred  .    On physical exam, murphy splints was removed from the bilateral nasal cavities.  The underlying septum appeared to be healing very well. Midline with resolution of deviation.    Impression today is consistent with doing well status post ST.  I have recommended that they begin on Sinus Rinse twice daily and return to see us in 3 months time.    Thank you for allowing me to participate in her care.  I will keep you abreast of her progress.  Please do not hesitate to contact me with any questions.    Sincerely,    Hany Stockton MD     [employer]

## 2024-11-27 ENCOUNTER — PATIENT MESSAGE (OUTPATIENT)
Dept: ADMINISTRATIVE | Facility: HOSPITAL | Age: 64
End: 2024-11-27
Payer: MEDICARE

## 2024-11-30 ENCOUNTER — NURSE TRIAGE (OUTPATIENT)
Dept: ADMINISTRATIVE | Facility: CLINIC | Age: 64
End: 2024-11-30
Payer: MEDICARE

## 2024-12-01 NOTE — TELEPHONE ENCOUNTER
Pt reports no BM in a week, Pt advised to see a MD within 24 hours per protocol, discussed the OTC meds recommended from protocol care advise. Pt encouraged to call back with any worsening symptoms or questions. She verbalized understanding.    Reason for Disposition   Last bowel movement (BM) > 4 days ago    Additional Information   Negative: [1] Vomiting AND [2] contains bile (green color)   Negative: Patient sounds very sick or weak to the triager   Negative: [1] Vomiting AND [2] abdomen looks much more swollen than usual   Negative: [1] Constant abdominal pain AND [2] present > 2 hours   Negative: [1] Rectal pain or fullness from fecal impaction (rectum full of stool) AND [2] NOT better after SITZ bath, suppository or enema   Negative: [1] MILD abdominal pain (e.g., does not interfere with normal activities) AND [2] pain comes and goes (cramps) AND [3] fever    Protocols used: Constipation-A-AH

## 2025-01-06 ENCOUNTER — CLINICAL SUPPORT (OUTPATIENT)
Dept: SMOKING CESSATION | Facility: CLINIC | Age: 65
End: 2025-01-06
Payer: COMMERCIAL

## 2025-01-06 DIAGNOSIS — F17.200 NICOTINE DEPENDENCE: Primary | ICD-10-CM

## 2025-01-06 PROCEDURE — 99406 BEHAV CHNG SMOKING 3-10 MIN: CPT | Mod: S$GLB,,,

## 2025-01-06 NOTE — PROGRESS NOTES
Spoke with patient today in regard to smoking cessation progress for 6 month telephone follow up, she states not tobacco free.  Patient states she received a bill and I explained that our program was free and she was member of the [facility].  Patient states she is busy and will contact me at a later time to discuss the program.  Informed patient she has benefits.  Patient ended the call. Will complete smart form for 6 month follow up on Quit attempt #1.

## 2025-02-11 ENCOUNTER — PATIENT MESSAGE (OUTPATIENT)
Dept: OTOLARYNGOLOGY | Facility: CLINIC | Age: 65
End: 2025-02-11
Payer: MEDICARE

## 2025-02-26 ENCOUNTER — PATIENT MESSAGE (OUTPATIENT)
Dept: ADMINISTRATIVE | Facility: HOSPITAL | Age: 65
End: 2025-02-26
Payer: MEDICARE

## 2025-02-26 DIAGNOSIS — E11.9 TYPE 2 DIABETES MELLITUS WITHOUT COMPLICATION, UNSPECIFIED WHETHER LONG TERM INSULIN USE: ICD-10-CM

## 2025-03-03 ENCOUNTER — OFFICE VISIT (OUTPATIENT)
Dept: OTOLARYNGOLOGY | Facility: CLINIC | Age: 65
End: 2025-03-03
Payer: MEDICARE

## 2025-03-03 VITALS
DIASTOLIC BLOOD PRESSURE: 94 MMHG | BODY MASS INDEX: 24.05 KG/M2 | WEIGHT: 140.88 LBS | SYSTOLIC BLOOD PRESSURE: 163 MMHG | HEART RATE: 79 BPM | HEIGHT: 64 IN

## 2025-03-03 DIAGNOSIS — Z98.890 S/P NASAL SEPTOPLASTY: Primary | ICD-10-CM

## 2025-03-03 PROCEDURE — 99999 PR PBB SHADOW E&M-EST. PATIENT-LVL III: CPT | Mod: PBBFAC,,, | Performed by: STUDENT IN AN ORGANIZED HEALTH CARE EDUCATION/TRAINING PROGRAM

## 2025-03-03 NOTE — PROGRESS NOTES
"  Subjective:      Aileen is a 64 y.o. female who comes for follow-up of  nasal obstruction .  Her last visit with me was on 11/26/2024.  She recently septoplasty and SMRT on 11/21/24. Overall doing well. No nasal obstruction. Reports some episodic crusting in nose.     Her current sinus regime consists of: No medications.     The patient's medications, allergies, past medical, surgical, social and family histories were reviewed and updated as appropriate.    ROS     A detailed review of systems was obtained with pertinent positives as per the above HPI, and otherwise negative.        Objective:     BP (!) 163/94 (BP Location: Right arm, Patient Position: Sitting)   Pulse 79   Ht 5' 4" (1.626 m)   Wt 63.9 kg (140 lb 14 oz)   BMI 24.18 kg/m²        Constitutional:   Vital signs are normal. She appears well-developed. Normal speech.      Head:  Normocephalic and atraumatic.     Ears:    Right Ear: No drainage or tenderness. No middle ear effusion.   Left Ear: No drainage or tenderness.  No middle ear effusion.     Nose:  No septal deviation. No turbinate hypertrophy.          Mouth/Throat  Oropharynx clear and moist without lesions or asymmetry and normal uvula midline. No trismus. No oropharyngeal exudate. Mirror exam not performed due to patient tolerance.  Mirror exam not performed due to patient tolerance.      Neck:  Neck normal without thyromegaly masses, asymmetry, normal tracheal structure, crepitus, and tenderness, thyroid normal and trachea normal.     Pulmonary/Chest:   Effort normal.     Psychiatric:   She has a normal mood and affect. Her speech is normal.     Skin:   No abrasions, lacerations, lesions, or rashes.       Procedure    None        Data Reviewed    WBC (K/uL)   Date Value   05/28/2021 8.27     Eosinophil % (%)   Date Value   07/07/2008 2.8     Eos # (K/uL)   Date Value   07/07/2008 0.2     Platelets (K/uL)   Date Value   05/28/2021 187     Glucose (mg/dL)   Date Value   06/07/2022 128 (H) "     Total IgE (IU/mL)   Date Value   10/07/2020 74       No sinus imaging available.      Assessment:     1. S/P nasal septoplasty         Plan:     - Recommend high volume saline irrigations.   - RTC PRN    Hany Stockton MD

## 2025-04-23 RX ORDER — ALBUTEROL SULFATE 90 UG/1
2 INHALANT RESPIRATORY (INHALATION) EVERY 6 HOURS PRN
Qty: 18 G | Refills: 1 | Status: SHIPPED | OUTPATIENT
Start: 2025-04-23

## 2025-04-23 NOTE — TELEPHONE ENCOUNTER
----- Message from Liane sent at 4/23/2025 12:05 PM CDT -----  Regarding: Refill Request  Contact: Ochoa/Vito  Refill RequestType:  Rx Refill RequestRefill or New Rx:  RefillRx Name and Strength:   albuterol (VENTOLIN HFA) 90 mcg/actuation inhalerPreferred Pharmacy with phone number:[id number] [address] [facility] [address]Ws[place]: [phone] Fax: [phone] or Mail Order:  LocalWould the patient rather a call back or response vis [facility]?  Call Back  Best Call Back Number: [phone] Information:   Dr. Bettencourt at bedside.

## 2025-06-25 ENCOUNTER — OFFICE VISIT (OUTPATIENT)
Dept: PRIMARY CARE CLINIC | Facility: CLINIC | Age: 65
End: 2025-06-25
Payer: MEDICARE

## 2025-06-25 VITALS
DIASTOLIC BLOOD PRESSURE: 80 MMHG | HEART RATE: 95 BPM | TEMPERATURE: 99 F | OXYGEN SATURATION: 94 % | WEIGHT: 134.5 LBS | SYSTOLIC BLOOD PRESSURE: 124 MMHG | BODY MASS INDEX: 22.96 KG/M2 | HEIGHT: 64 IN

## 2025-06-25 DIAGNOSIS — Z87.891 PERSONAL HISTORY OF NICOTINE DEPENDENCE: ICD-10-CM

## 2025-06-25 DIAGNOSIS — J44.1 COPD WITH ACUTE EXACERBATION: Primary | ICD-10-CM

## 2025-06-25 PROCEDURE — 94640 AIRWAY INHALATION TREATMENT: CPT | Mod: S$GLB,,, | Performed by: STUDENT IN AN ORGANIZED HEALTH CARE EDUCATION/TRAINING PROGRAM

## 2025-06-25 PROCEDURE — 99999 PR PBB SHADOW E&M-EST. PATIENT-LVL IV: CPT | Mod: PBBFAC,,, | Performed by: STUDENT IN AN ORGANIZED HEALTH CARE EDUCATION/TRAINING PROGRAM

## 2025-06-25 PROCEDURE — 3074F SYST BP LT 130 MM HG: CPT | Mod: CPTII,S$GLB,, | Performed by: STUDENT IN AN ORGANIZED HEALTH CARE EDUCATION/TRAINING PROGRAM

## 2025-06-25 PROCEDURE — 3079F DIAST BP 80-89 MM HG: CPT | Mod: CPTII,S$GLB,, | Performed by: STUDENT IN AN ORGANIZED HEALTH CARE EDUCATION/TRAINING PROGRAM

## 2025-06-25 PROCEDURE — 3008F BODY MASS INDEX DOCD: CPT | Mod: CPTII,S$GLB,, | Performed by: STUDENT IN AN ORGANIZED HEALTH CARE EDUCATION/TRAINING PROGRAM

## 2025-06-25 PROCEDURE — 99215 OFFICE O/P EST HI 40 MIN: CPT | Mod: 25,S$GLB,, | Performed by: STUDENT IN AN ORGANIZED HEALTH CARE EDUCATION/TRAINING PROGRAM

## 2025-06-25 PROCEDURE — 1159F MED LIST DOCD IN RCRD: CPT | Mod: CPTII,S$GLB,, | Performed by: STUDENT IN AN ORGANIZED HEALTH CARE EDUCATION/TRAINING PROGRAM

## 2025-06-25 RX ORDER — PREDNISONE 20 MG/1
40 TABLET ORAL DAILY
Qty: 10 TABLET | Refills: 0 | Status: SHIPPED | OUTPATIENT
Start: 2025-06-25 | End: 2025-06-30

## 2025-06-25 RX ORDER — IPRATROPIUM BROMIDE AND ALBUTEROL SULFATE 2.5; .5 MG/3ML; MG/3ML
3 SOLUTION RESPIRATORY (INHALATION) EVERY 6 HOURS PRN
Qty: 75 ML | Refills: 3 | Status: SHIPPED | OUTPATIENT
Start: 2025-06-25

## 2025-06-25 RX ORDER — ALBUTEROL SULFATE 90 UG/1
2 INHALANT RESPIRATORY (INHALATION) EVERY 6 HOURS PRN
Qty: 18 G | Refills: 1 | Status: SHIPPED | OUTPATIENT
Start: 2025-06-25

## 2025-06-25 RX ORDER — ALBUTEROL SULFATE 2.5 MG/.5ML
2.5 SOLUTION RESPIRATORY (INHALATION)
Status: COMPLETED | OUTPATIENT
Start: 2025-06-25 | End: 2025-06-25

## 2025-06-25 RX ADMIN — ALBUTEROL SULFATE 2.5 MG: 2.5 SOLUTION RESPIRATORY (INHALATION) at 02:06

## 2025-06-25 NOTE — PROGRESS NOTES
Primary Care  Office Visit - In Person  6/25/2025      HPI    Patient is a 64 y.o.   Aileen Alegria  has a past medical history of Allergy, Environmental and seasonal allergies, and Moderate persistent asthma without complication (4/8/2022).    History of Present Illness    CHIEF COMPLAINT:  Patient presents today with shortness of breath and difficulty breathing.    RESPIRATORY:  She reports significant breathing difficulties characterized by shortness of breath with minimal exertion, requiring rest after walking only a few steps. This respiratory distress significantly limits her physical activity and mobility.  She has been using Advair but has run out of albuterol and nebulizer solution. She continues to smoke cigarettes.         Active Medications:  Current Outpatient Medications   Medication Instructions    albuterol (ACCUNEB) 1.25 mg/3 mL Nebu USE 1 VIAL PER NEBULIZER EVERY 6 HOURS AS NEEDED FOR SHORTNESS OF BREATH OR WHEEZING    albuterol (VENTOLIN HFA) 90 mcg/actuation inhaler 2 puffs, Inhalation, Every 6 hours PRN, Rescue    albuterol-ipratropium (DUO-NEB) 2.5 mg-0.5 mg/3 mL nebulizer solution 3 mLs, Nebulization, Every 6 hours PRN, Rescue    atorvastatin (LIPITOR) 10 mg, Oral, Nightly    baclofen (LIORESAL) 10 mg, Oral, 3 times daily PRN    benzonatate (TESSALON) 100-200 mg, Oral, 3 times daily PRN    budesonide-formoterol 160-4.5 mcg (SYMBICORT) 160-4.5 mcg/actuation HFAA 2 puffs, Inhalation, Every 12 hours    camphor-menthoL 0.2-3.5 % Gel 1 application , Topical (Top), 2 times daily PRN    cefdinir (OMNICEF) 300 mg, Oral, 2 times daily    cetirizine (ZYRTEC) 10 mg, Oral, Daily    diclofenac sodium (VOLTAREN ARTHRITIS PAIN) 2 g, Topical (Top), 4 times daily PRN    DULoxetine (CYMBALTA) 30 mg, Oral, Daily    fluticasone (FLONASE SENSIMIST) 27.5 mcg/actuation nasal spray 2 sprays, Nasal, Daily    ketorolac 0.5% (ACULAR) 0.5 % Drop 1 drop, Both Eyes, 4 times daily PRN    levocetirizine (XYZAL) 5 mg, Oral,  "Nightly    loratadine (CLARITIN) 10 mg, Oral, Daily PRN    mometasone (NASONEX) 50 mcg/actuation nasal spray 2 sprays, Nasal, 2 times daily    montelukast (SINGULAIR) 10 mg, Oral, Nightly    ondansetron (ZOFRAN-ODT) 4 mg, Oral, Every 8 hours PRN    predniSONE (DELTASONE) 40 mg, Oral, Daily    promethazine-dextromethorphan (PROMETHAZINE-DM) 6.25-15 mg/5 mL Syrp 5 mLs, Oral, Every 4 hours PRN    sodium chloride (SALINE NASAL) 0.65 % nasal spray 2 sprays, Nasal, Every 4 hours, Every 4 hours while awake (starting postop day 1) apply to bilateral nasal cavities       Vitals:    06/25/25 1342   BP: 124/80   BP Location: Right arm   Patient Position: Sitting   Pulse: 95   Temp: 98.6 °F (37 °C)   SpO2: (!) 94%   Weight: 61 kg (134 lb 7.7 oz)   Height: 5' 4" (1.626 m)       Physical Exam  Constitutional:       General: She is not in acute distress.     Appearance: She is not ill-appearing.   Cardiovascular:      Rate and Rhythm: Normal rate and regular rhythm.   Pulmonary:      Effort: Pulmonary effort is normal.      Breath sounds: Wheezing (diffuse) present.   Neurological:      Mental Status: She is alert.        CXR 6/2025     No acute findings in the chest.     Assessment & Plan     CHRONIC OBSTRUCTIVE PULMONARY DISEASE (COPD) EXACERBATION:  Unclear if patient ever took dexamethasone prescribed in ED. She continues Cefdinir.   Albuterol nebulizer administered in clinic.   Start prednisone 40 mg daily x 5 days. Resume Duo-nebs at home   Continue ICS-LABA and VINCENT prn.   Recommended ED for worsening dyspnea     NICOTINE DEPENDENCE:  Educated on the importance of smoking cessation for improving respiratory symptoms and advised to stop smoking immediately to improve overall health.            Orders Placed This Encounter    albuterol sulfate nebulizer solution 2.5 mg    albuterol (VENTOLIN HFA) 90 mcg/actuation inhaler    predniSONE (DELTASONE) 20 MG tablet    albuterol-ipratropium (DUO-NEB) 2.5 mg-0.5 mg/3 mL nebulizer " solution         Previous labs, records, and notes reviewed and considered for their impact on clinical decision making today.                Upcoming Scheduled Appointments and Follow Up:    Future Appointments   Date Time Provider Department Center   9/17/2025  2:00 PM Doreen Hull MD [facility] [place]       Follow Up [facility] (with who? when?): Follow up in about 1 week (around 7/2/2025).      Extended Emergency Contact Information  Primary Emergency Contact: BeltranConcepcion   [place]  Mobile Phone: [phone]  Relation: Daughter      Genesis Marcellahuang, MD   Internal Medicine  6/25/2025 - 2:44 PM    I spent a total of 30 minutes on the day of the visit.This includes face to face time and non-face to face time preparing to see the patient (eg, review of tests), obtaining and/or reviewing separately obtained history, documenting clinical information in the electronic or other health record, independently interpreting results and communicating results to the patient/family/caregiver, or care coordinator.    This note was generated with the assistance of ambient listening technology. Verbal consent was obtained by the patient and accompanying visitor(s) for the recording of patient appointment to facilitate this note. I attest to having reviewed and edited the generated note for accuracy, though some syntax or spelling errors may persist. Please contact the author of this note for any clarification.      While patients have the right to access their medical record, it is essential to recognize that progress notes primarily serve as a means of communication among healthcare professionals.

## 2025-06-28 ENCOUNTER — PATIENT MESSAGE (OUTPATIENT)
Dept: ADMINISTRATIVE | Facility: HOSPITAL | Age: 65
End: 2025-06-28
Payer: MEDICARE

## 2025-07-14 ENCOUNTER — TELEPHONE (OUTPATIENT)
Dept: SMOKING CESSATION | Facility: CLINIC | Age: 65
End: 2025-07-14
Payer: MEDICARE

## 2025-07-14 NOTE — TELEPHONE ENCOUNTER
Called patient about 12 month follow up. Left message for patient to call [phone]. Resolved quit episode.

## 2025-07-16 NOTE — PROGRESS NOTES
Allergy Clinic Note  [facility]    This note was created by combination of typed  and  dictation. Transcription errors may be present.  If there are any questions, please contact me.      HISTORY      Patient ID: Aileen Alegria is a 64 y.o. female.    Chief Complaint: No chief complaint on file.      Allergy problem list:     Chronic allergic rhinitis: dust mite, pet dog  Chest symptoms  Frequent sinusitis  Smoking      History of Present Illness:  64 year-old female with chronic rhinitis, chest symptoms is 9 months overdue for follow-up of same. She is here requesting refils of all allergy and asthma medicines.  She is here alone, the history is very difficult.    Related medications and other interventions  Pt is not sure about meds   Med list has Symbicort, albuterol, 2 nasal steroids, 3 antihistamines and montelukast and 2 nebulizer solutions.  Client says she is taking 2 pills. 2 pumps and a nose spray.  Atempted to identify by colors    07/17/2025: Pt was seen in ED 6/19/2025. Initially attributed to pneumonia, although CxR sunsequently read as clear. Having daily SOB at presents.  Says she has been out of red inhaler (Symbicort) and pill (montelukast) for a while.  Using nebulizer about twice daily with benefit.  Also complaints of runny nose, sneezing and itching.  No clear if on any allergy medicines.  Since last visit, client underwent septoplasty with nasal turbinate reduction (Dr. Stockton, 11/21/2024).     7/18/2024:  Client's chief complaint is a frontal headache.  She also reports runny and stuffy nose.  She describes shortness of breath that it is not clear whether she is describing upper airway or lower airway symptoms.  She says she uses albuterol 2 to 3 times a week, but it appears she takes this when her nose is clogged.  She says her nasal symptoms are worse in early morning and that it takes about 45 minutes for her to clear out her nose.  She has never used nasal rinses.  " She also complains of itchy eyes.  She is not able to quantify her chest symptoms.  She requests a replacement nebulizer.  She does not know her medications.    1/19/22:    Client says that for the last 2-3 weeks she has been experiencing both head and chest symptoms.  The head symptoms consist of a right-sided headache, itchy throat, itchy right ear, sore nostrils, runny nose, sneezing, soreness on the bridge of her nose extending toward her maxillary sinuses, and copious white material from her nose.  She denies fever or purulence.  She also denies ill exposures.      Over the same time.  She has been experiencing chest "itching" and tightness.  This is associated with severe shortness of breath she also admits to wheezing and coughing.  Symptoms are severe in interfere with her ADLs such as house cleaning and also interrupt her sleep.  She says she obtains complete relief with 1-4 albuterol ampules.  This relief last 4-5 hours.  She does awaken at night due to shortness of breath every night she is using at least 12 ampules of albuterol by neb per day.  She is not taking any other medications.    She reports her last asthma exacerbation was 2-3 months ago.      5/26/2021:  She reported that her breathing had been bad for the prior two months.  She is unable to tell me whether she is referring to breathing in her chest, breathing in her nose, or both.  The symptoms she can define are fatigue, itching of eyes and nose, headache and sore nose.  She also reports "grasping" but is unable to define further.  She reports she was blowing albuterol from her son's nebulizer into her nose but stopped due to drying sensation.  Did not know what medications she was taking.  She was referred to ENT, given her own nebulizer, increased her Xyzal dose to control the itching.    10/8/2020:  At her initial visit  she reported a long history of rhinitis.  Her chief complaint was bilateral ear itching which kept her awake at night.  " Other symptoms include complete nasal blockage at times, nasal congestion, runny nose, postnasal drip sensation, sneezing, itchy eyes, red eyes and throat clearing.  Symptoms are severe and interfere with her ability to function.  She says she often takes naps in order to avoid feeling these symptoms.  This is despite very frequent use of antihistamines and nasal steroids on a daily basis.  Precipitants include smoke and fresh cut grass.  Symptoms are sometimes worse outside.  They are perennial without a noticeable variation.  When symptoms are severe she often gets sinus infections.  These are manifest primarily with tenderness of her forehead and complete nasal blockage.  She has had no workup to date.  She admits an improvement in her symptoms (but not complete resolution) when she was taking steroids for torn rotator cuff earlier this year.  She had no steroid side effects.    For the previous 6 months (since about April 2020) she had also been experiencing chest symptoms. She describes an itching feeling in the center of her chest.  This is associated with chest tightness, shortness of breath, wheezing, and coughing.  At times the shortness of breath is severe.  She has been helped by a relative's albuterol by nebulizer.  These severe shortness of breath episodes have occurred twice in the last 6 months.  There are no clear precipitants.  She has had no workup.      Past history     Past medical history significant for smoking, hypertension, diabetes   Problem list reviewed   smoking Hx:  Client  reports that she has been smoking cigarettes and vaping with nicotine. She started smoking about 41 years ago. She has a 48.5 pack-year smoking history. She has never used smokeless tobacco.   MAR reviewed  ENT history notable for Status post septoplasty with turbinate reduction 2024.  Exposures: a pet dog and frequent house cleaning.  + passive and active smoke.      Patient Active Problem List   Diagnosis     Decreased range of motion of left shoulder    Decreased strength    Cigarette nicotine dependence without complication    Rotator cuff tear arthropathy of left shoulder    Environmental and seasonal allergies    H/O cervical spine surgery    Type 2 diabetes mellitus without complication, without long-term current use of insulin    Hyperlipidemia LDL goal <100    Allergic rhinitis due to house dust mite    Bronchitis    Chronic right shoulder pain    10 year risk of MI or stroke 7.5% or greater    Encounter for smoking cessation counseling    Cervical myelopathy    Moderate persistent asthma without complication    Asthma    Muscle spasm    Acute pain of right shoulder     Current Outpatient Medications on File Prior to Visit   Medication Sig Dispense Refill    albuterol-ipratropium (DUO-NEB) 2.5 mg-0.5 mg/3 mL nebulizer solution Take 3 mLs by nebulization every 6 (six) hours as needed for Wheezing. Rescue 75 mL 3    atorvastatin (LIPITOR) 10 MG tablet Take 1 tablet (10 mg total) by mouth every evening. 90 tablet 3    baclofen (LIORESAL) 10 MG tablet Take 1 tablet (10 mg total) by mouth 3 (three) times daily as needed. 30 tablet 0    diclofenac sodium (VOLTAREN ARTHRITIS PAIN) 1 % Gel Apply 2 g topically 4 (four) times daily as needed (joint pain). 200 g 1    ondansetron (ZOFRAN-ODT) 4 MG TbDL Take 1 tablet (4 mg total) by mouth every 8 (eight) hours as needed (nausea/vomiting). 20 tablet 0    sodium chloride (SALINE NASAL) 0.65 % nasal spray 2 sprays by Nasal route every 4 (four) hours. Every 4 hours while awake (starting postop day 1) apply to bilateral nasal cavities 44 mL 12    [DISCONTINUED] albuterol (ACCUNEB) 1.25 mg/3 mL Nebu USE 1 VIAL PER NEBULIZER EVERY 6 HOURS AS NEEDED FOR SHORTNESS OF BREATH OR WHEEZING 225 mL 1    [DISCONTINUED] albuterol (VENTOLIN HFA) 90 mcg/actuation inhaler Inhale 2 puffs into the lungs every 6 (six) hours as needed for Wheezing or Shortness of Breath. Rescue 18 g 1     "[DISCONTINUED] budesonide-formoterol 160-4.5 mcg (SYMBICORT) 160-4.5 mcg/actuation HFAA Inhale 2 puffs into the lungs every 12 (twelve) hours. 20.4 g 6    [DISCONTINUED] cetirizine (ZYRTEC) 10 MG tablet Take 1 tablet (10 mg total) by mouth once daily. 90 tablet 3    [DISCONTINUED] fluticasone (FLONASE SENSIMIST) 27.5 mcg/actuation nasal spray 2 sprays by Nasal route once daily. 9.1 mL 11    [DISCONTINUED] ketorolac 0.5% (ACULAR) 0.5 % Drop Place 1 drop into both eyes 4 (four) times daily as needed (eye itching). 10 mL 1    [DISCONTINUED] levocetirizine (XYZAL) 5 MG tablet Take 1 tablet (5 mg total) by mouth every evening. 90 tablet 3    [DISCONTINUED] loratadine (CLARITIN) 10 mg tablet Take 1 tablet (10 mg total) by mouth daily as needed. 90 tablet 3    [DISCONTINUED] mometasone (NASONEX) 50 mcg/actuation nasal spray 2 sprays by Nasal route 2 (two) times daily. 2 each 5    [DISCONTINUED] montelukast (SINGULAIR) 10 mg tablet Take 1 tablet (10 mg total) by mouth every evening. 30 tablet 11    camphor-menthoL 0.2-3.5 % Gel Apply 1 application topically 2 (two) times daily as needed (pain). 113.4 g 3    DULoxetine (CYMBALTA) 30 MG capsule Take 1 capsule (30 mg total) by mouth once daily. 30 capsule 11     No current facility-administered medications on file prior to visit.       Review of systems     Review of Systems   Constitutional:  Negative for chills and fever.   HENT:  Negative for ear discharge and ear pain.    Eyes:  Negative for pain and discharge.   Respiratory:  Positive for shortness of breath. Negative for stridor.    Cardiovascular:  Negative for chest pain and palpitations.   Skin:  Negative for itching and rash.       PHYSICAL EXAM   Ht 5' 4" (1.626 m)   Wt 61 kg (134 lb 7.7 oz)   BMI 23.08 kg/m²   GEN: Awake and alert, no distress  DERM:  No flushing, no rashes  EYE:  No occular discharge, no redness  HEENT: No nasal discharge, no hoarseness  PULM: No accessory muscle use, speaking in full sentences, " A--P in single breath, CTA A&P  COR:  RRR, normal radial pulses, good cap refill  NEURO:  No focal deficit, speech fluent and logical  PSYCH: appropriate affect, normal behavior    MEDICAL DECISION-MAKING     Data reviewed: (new entries in bold face)     Allergy testing     Aeroallergen testing by the Immunocap method (10/07/2020) was positive for dust mite and dog.   Class III   dust mite (Df and Dp), dog  All other allergens less than 0.35 KU/L. Ragweed 0.11  Total serum IgE 74    Pulmonary testing     Complete PFTs (7/17/2025, during SOB, on no controller meds)  Moderate obstruction + BrD response    Labs     Eosinophil count 500 on CBC of 8/03/2020    Diagnoses:     1. Allergic asthma, severe persistent, with acute exacerbation    2. Asthma-COPD overlap syndrome    3. Chronic allergic rhinitis            Assessment / Plan:   This is a difficult case with an extremely limited history. Ms Alegria is here during an asthma exacerbation,  probably due to running out of her controller medicines. She was seen in the ED for similar symptoms a month ago. She needs additional systemic steroids but does not require a higher level of care. Pred burst prescribed, starting at 60 mg.   Restart controller medications (Symbicort and montelukast) as well as allergy medicine (Flonase, Xyzal).  Follow up 7/31/2025.      Diagnoses and all orders for this visit:    Asthma, severe persistent, with acute exacerbation  Asthma-COPD overlap  -     budesonide-formoterol 160-4.5 mcg (SYMBICORT) 160-4.5 mcg/actuation HFAA; Inhale 2 puffs into the lungs every 12 (twelve) hours.  -     albuterol (VENTOLIN HFA) 90 mcg/actuation inhaler; Inhale 2 puffs into the lungs every 6 (six) hours as needed for Wheezing or Shortness of Breath. Rescue  -     montelukast (SINGULAIR) 10 mg tablet; Take 1 tablet (10 mg total) by mouth every evening.  -     albuterol (ACCUNEB) 1.25 mg/3 mL Nebu; USE 1 VIAL PER NEBULIZER EVERY 6 HOURS AS NEEDED FOR SHORTNESS OF  BREATH OR WHEEZING  -     predniSONE (DELTASONE) 10 MG tablet; Take 6 tabs on day 1 Take 5 tabs on day 2 Take 4 tabs on day 3 Take 3 tabs on day 4 Take 2 tabs on day 5 Take 1 tab on day 6 Stop    Chronic allergic rhinitis  -     levocetirizine (XYZAL) 5 MG tablet; Take 1 tablet (5 mg total) by mouth every evening.  -     fluticasone (FLONASE SENSIMIST) 27.5 mcg/actuation nasal spray; 2 sprays by Nasal route once daily.        Comorbidities  Hypertension   Smoking      Instructions and next steps     Patient Instructions       Prednisone burst using 10mg tablets:  6 tablets on day 1  5 tablets on day 2  4 tablets on day 3  3 tablets on day 4  2 tablets on day 5  1 tablet on day 6  STOP      Follow up in about 2 weeks (around 7/31/2025).    Doreen Hull MD    I spent a total of 48 minutes on the day of the visit. This includes face to face time and non-face to face time preparing to see the patient (eg, review of tests), obtaining and/or reviewing separately obtained history, documenting clinical information in the electronic or other health record, independently interpreting results and communicating results to the patient/family/caregiver, or care coordinator.

## 2025-07-17 ENCOUNTER — HOSPITAL ENCOUNTER (OUTPATIENT)
Dept: PULMONOLOGY | Facility: CLINIC | Age: 65
Discharge: HOME | End: 2025-07-17
Payer: MEDICARE

## 2025-07-17 ENCOUNTER — OFFICE VISIT (OUTPATIENT)
Dept: ALLERGY | Facility: CLINIC | Age: 65
End: 2025-07-17
Payer: MEDICARE

## 2025-07-17 VITALS — BODY MASS INDEX: 22.96 KG/M2 | WEIGHT: 134.5 LBS | HEIGHT: 64 IN

## 2025-07-17 DIAGNOSIS — J45.909 ASTHMA: ICD-10-CM

## 2025-07-17 DIAGNOSIS — J45.51 ALLERGIC ASTHMA, SEVERE PERSISTENT, WITH ACUTE EXACERBATION: Primary | ICD-10-CM

## 2025-07-17 DIAGNOSIS — J44.89 ASTHMA-COPD OVERLAP SYNDROME: ICD-10-CM

## 2025-07-17 DIAGNOSIS — J40 BRONCHITIS: Primary | ICD-10-CM

## 2025-07-17 DIAGNOSIS — J30.9 CHRONIC ALLERGIC RHINITIS: ICD-10-CM

## 2025-07-17 PROCEDURE — 1159F MED LIST DOCD IN RCRD: CPT | Mod: CPTII,S$GLB,, | Performed by: STUDENT IN AN ORGANIZED HEALTH CARE EDUCATION/TRAINING PROGRAM

## 2025-07-17 PROCEDURE — 1160F RVW MEDS BY RX/DR IN RCRD: CPT | Mod: CPTII,S$GLB,, | Performed by: STUDENT IN AN ORGANIZED HEALTH CARE EDUCATION/TRAINING PROGRAM

## 2025-07-17 PROCEDURE — 99215 OFFICE O/P EST HI 40 MIN: CPT | Mod: S$GLB,,, | Performed by: STUDENT IN AN ORGANIZED HEALTH CARE EDUCATION/TRAINING PROGRAM

## 2025-07-17 PROCEDURE — 99999 PR PBB SHADOW E&M-EST. PATIENT-LVL III: CPT | Mod: PBBFAC,,, | Performed by: STUDENT IN AN ORGANIZED HEALTH CARE EDUCATION/TRAINING PROGRAM

## 2025-07-17 PROCEDURE — 3008F BODY MASS INDEX DOCD: CPT | Mod: CPTII,S$GLB,, | Performed by: STUDENT IN AN ORGANIZED HEALTH CARE EDUCATION/TRAINING PROGRAM

## 2025-07-17 RX ORDER — PREDNISONE 10 MG/1
TABLET ORAL
Qty: 21 TABLET | Refills: 0 | Status: SHIPPED | OUTPATIENT
Start: 2025-07-17

## 2025-07-17 RX ORDER — LEVOCETIRIZINE DIHYDROCHLORIDE 5 MG/1
5 TABLET, FILM COATED ORAL NIGHTLY
Qty: 90 TABLET | Refills: 3 | Status: SHIPPED | OUTPATIENT
Start: 2025-07-17 | End: 2026-07-17

## 2025-07-17 RX ORDER — MONTELUKAST SODIUM 10 MG/1
10 TABLET ORAL NIGHTLY
Qty: 30 TABLET | Refills: 11 | Status: SHIPPED | OUTPATIENT
Start: 2025-07-17

## 2025-07-17 RX ORDER — BUDESONIDE AND FORMOTEROL FUMARATE DIHYDRATE 160; 4.5 UG/1; UG/1
2 AEROSOL RESPIRATORY (INHALATION) EVERY 12 HOURS
Qty: 20.4 G | Refills: 6 | Status: SHIPPED | OUTPATIENT
Start: 2025-07-17

## 2025-07-17 RX ORDER — FLUTICASONE FUROATE 27.5 UG/1
2 SPRAY, METERED NASAL DAILY
Qty: 9.1 ML | Refills: 11 | Status: SHIPPED | OUTPATIENT
Start: 2025-07-17

## 2025-07-17 RX ORDER — ALBUTEROL SULFATE 1.25 MG/3ML
SOLUTION RESPIRATORY (INHALATION)
Qty: 225 ML | Refills: 1 | Status: SHIPPED | OUTPATIENT
Start: 2025-07-17

## 2025-07-17 RX ORDER — ALBUTEROL SULFATE 90 UG/1
2 INHALANT RESPIRATORY (INHALATION) EVERY 6 HOURS PRN
Qty: 18 G | Refills: 1 | Status: SHIPPED | OUTPATIENT
Start: 2025-07-17

## 2025-07-17 NOTE — PATIENT INSTRUCTIONS
Prednisone burst using 10mg tablets:  6 tablets on day 1  5 tablets on day 2  4 tablets on day 3  3 tablets on day 4  2 tablets on day 5  1 tablet on day 6  STOP

## 2025-07-21 ENCOUNTER — HOSPITAL ENCOUNTER (OUTPATIENT)
Dept: RADIOLOGY | Facility: OTHER | Age: 65
Discharge: HOME | End: 2025-07-21
Attending: STUDENT IN AN ORGANIZED HEALTH CARE EDUCATION/TRAINING PROGRAM
Payer: MEDICARE

## 2025-07-21 VITALS — WEIGHT: 125 LBS | BODY MASS INDEX: 21.34 KG/M2 | HEIGHT: 64 IN

## 2025-07-21 DIAGNOSIS — Z12.31 SCREENING MAMMOGRAM FOR BREAST CANCER: ICD-10-CM

## 2025-07-21 PROCEDURE — 77067 SCR MAMMO BI INCL CAD: CPT | Mod: TC

## 2025-07-28 ENCOUNTER — OFFICE VISIT (OUTPATIENT)
Dept: OBSTETRICS AND GYNECOLOGY | Facility: CLINIC | Age: 65
End: 2025-07-28
Payer: MEDICARE

## 2025-07-28 VITALS
BODY MASS INDEX: 23.27 KG/M2 | DIASTOLIC BLOOD PRESSURE: 82 MMHG | WEIGHT: 135.56 LBS | SYSTOLIC BLOOD PRESSURE: 140 MMHG

## 2025-07-28 DIAGNOSIS — Z01.419 WELL WOMAN EXAM WITH ROUTINE GYNECOLOGICAL EXAM: Primary | ICD-10-CM

## 2025-07-28 DIAGNOSIS — N39.41 URGE URINARY INCONTINENCE: ICD-10-CM

## 2025-07-28 DIAGNOSIS — Z78.0 ASYMPTOMATIC MENOPAUSAL STATE: ICD-10-CM

## 2025-07-28 PROCEDURE — 3077F SYST BP >= 140 MM HG: CPT | Mod: CPTII,S$GLB,, | Performed by: STUDENT IN AN ORGANIZED HEALTH CARE EDUCATION/TRAINING PROGRAM

## 2025-07-28 PROCEDURE — 99999 PR PBB SHADOW E&M-EST. PATIENT-LVL III: CPT | Mod: PBBFAC,,, | Performed by: STUDENT IN AN ORGANIZED HEALTH CARE EDUCATION/TRAINING PROGRAM

## 2025-07-28 PROCEDURE — 1159F MED LIST DOCD IN RCRD: CPT | Mod: CPTII,S$GLB,, | Performed by: STUDENT IN AN ORGANIZED HEALTH CARE EDUCATION/TRAINING PROGRAM

## 2025-07-28 PROCEDURE — 99386 PREV VISIT NEW AGE 40-64: CPT | Mod: S$GLB,,, | Performed by: STUDENT IN AN ORGANIZED HEALTH CARE EDUCATION/TRAINING PROGRAM

## 2025-07-28 PROCEDURE — 3079F DIAST BP 80-89 MM HG: CPT | Mod: CPTII,S$GLB,, | Performed by: STUDENT IN AN ORGANIZED HEALTH CARE EDUCATION/TRAINING PROGRAM

## 2025-07-28 PROCEDURE — 3008F BODY MASS INDEX DOCD: CPT | Mod: CPTII,S$GLB,, | Performed by: STUDENT IN AN ORGANIZED HEALTH CARE EDUCATION/TRAINING PROGRAM

## 2025-07-28 NOTE — PROGRESS NOTES
History & Physical  Gynecology      SUBJECTIVE:     Chief Complaint: Well Woman       History of Present Illness:  Annual Exam-Postmenopausal  Patient presents for annual exam. She has complaints of urge urinary incontinence. She reports feeling urge to urinate about every 30 minutes. States this is a chronic issue. She drinks water and cranberry juice throughout the day. One cup of coffee. She wears Depends in case of incontinence.   Menopause about 20 years ago.   Patient denies post-menopausal vaginal bleeding.   She is not sexually active. She has never taken hormone replacement therapy.  She participates in regular exercise: yes - walking when not hot outside.  She does smoke - trying to quit, down to two cigarettes per day.     GYN screening history: last pap: NILM/HPV neg 2021  Mammogram history: 2025 pending results  Colonoscopy history: 2024 repeat in 7 years  Dexa history: not yet done    FH:   Breast cancer: denies  Colon cancer: denies  Ovarian cancer: denies    Review of patient's allergies indicates:   Allergen Reactions    Amoxicillin Swelling    Diphenhydramine hcl Anaphylaxis    Ibuprofen Nausea And Vomiting and Swelling       Past Medical History:   Diagnosis Date    Allergy     Environmental and seasonal allergies     Moderate persistent asthma without complication 2022     Past Surgical History:   Procedure Laterality Date     SECTION      COLONOSCOPY N/A 1/3/2024    Procedure: COLONOSCOPY;  Surgeon: Ike Agarwal MD;  Location: [facility] (4TH FLR);  Service: Endoscopy;  Laterality: N/A;  inst. to pt. portal. Tb  Referral: Prabha Mancilla NP  -precall complete-MS    NECK SURGERY  2020    C 2-C7    SEPTOPLASTY, NOSE, WITH NASAL TURBINATE REDUCTION Left 2024    Procedure: SEPTOPLASTY, NOSE, WITH NASAL TURBINATE REDUCTION;  Surgeon: Hany Stockton MD;  Location: [facility] OR [address] [place] [address];  Service: ENT;  Laterality: Left;     OB History          2    Para   2     Term   2            AB        Living             SAB        IAB        Ectopic        Multiple        Live Births                   Family History   Problem Relation Name Age of Onset    Lung cancer Mother      Dementia Father      Hypertension Father      Prostate cancer Father      Hypertension Sister      No Known Problems Brother      No Known Problems Maternal Grandmother      No Known Problems Maternal Grandfather      No Known Problems Paternal Grandmother      No Known Problems Paternal Grandfather      Asthma Daughter Judina     Asthma Son Crow      Social History[1]    Current Outpatient Medications   Medication Sig    albuterol (ACCUNEB) 1.25 mg/3 mL Nebu USE 1 VIAL PER NEBULIZER EVERY 6 HOURS AS NEEDED FOR SHORTNESS OF BREATH OR WHEEZING    albuterol (VENTOLIN HFA) 90 mcg/actuation inhaler Inhale 2 puffs into the lungs every 6 (six) hours as needed for Wheezing or Shortness of Breath. Rescue    albuterol-ipratropium (DUO-NEB) 2.5 mg-0.5 mg/3 mL nebulizer solution Take 3 mLs by nebulization every 6 (six) hours as needed for Wheezing. Rescue    baclofen (LIORESAL) 10 MG tablet Take 1 tablet (10 mg total) by mouth 3 (three) times daily as needed.    budesonide-formoterol 160-4.5 mcg (SYMBICORT) 160-4.5 mcg/actuation HFAA Inhale 2 puffs into the lungs every 12 (twelve) hours.    camphor-menthoL 0.2-3.5 % Gel Apply 1 application topically 2 (two) times daily as needed (pain).    fluticasone (FLONASE SENSIMIST) 27.5 mcg/actuation nasal spray 2 sprays by Nasal route once daily.    levocetirizine (XYZAL) 5 MG tablet Take 1 tablet (5 mg total) by mouth every evening.    montelukast (SINGULAIR) 10 mg tablet Take 1 tablet (10 mg total) by mouth every evening.    predniSONE (DELTASONE) 10 MG tablet Take 6 tabs on day 1 Take 5 tabs on day 2 Take 4 tabs on day 3 Take 3 tabs on day 4 Take 2 tabs on day 5 Take 1 tab on day 6 Stop    sodium chloride (SALINE NASAL) 0.65 % nasal spray 2 sprays by Nasal route  every 4 (four) hours. Every 4 hours while awake (starting postop day 1) apply to bilateral nasal cavities    atorvastatin (LIPITOR) 10 MG tablet Take 1 tablet (10 mg total) by mouth every evening.    diclofenac sodium (VOLTAREN ARTHRITIS PAIN) 1 % Gel Apply 2 g topically 4 (four) times daily as needed (joint pain). (Patient not taking: Reported on 7/28/2025)    DULoxetine (CYMBALTA) 30 MG capsule Take 1 capsule (30 mg total) by mouth once daily.    ondansetron (ZOFRAN-ODT) 4 MG TbDL Take 1 tablet (4 mg total) by mouth every 8 (eight) hours as needed (nausea/vomiting). (Patient not taking: Reported on 7/28/2025)     No current facility-administered medications for this visit.       Review of Systems:  Review of Systems   Constitutional:  Negative for chills and fever.   Respiratory:  Negative for shortness of breath.    Cardiovascular:  Negative for chest pain.   Gastrointestinal:  Negative for abdominal pain, nausea and vomiting.   Endocrine: Negative for hot flashes.   Genitourinary:  Positive for urinary incontinence. Negative for dysuria and postmenopausal bleeding.   Integumentary:  Negative for breast mass, nipple discharge and breast skin changes.   Neurological:  Negative for headaches.   Hematological:  Does not bruise/bleed easily.   Psychiatric/Behavioral:  Negative for depression.    Breast: Negative for mass, mastodynia, nipple discharge and skin changes       OBJECTIVE:     Physical Exam:  Physical Exam  Constitutional:       Appearance: Normal appearance.   HENT:      Head: Normocephalic and atraumatic.   Eyes:      Conjunctiva/sclera: Conjunctivae normal.      Pupils: Pupils are equal, round, and reactive to light.   Cardiovascular:      Rate and Rhythm: Normal rate and regular rhythm.   Pulmonary:      Effort: Pulmonary effort is normal. No respiratory distress.   Chest:   Breasts:     Right: No inverted nipple, mass, nipple discharge, skin change or tenderness.      Left: No inverted nipple, mass,  nipple discharge, skin change or tenderness.   Abdominal:      General: Abdomen is flat. There is no distension.      Palpations: Abdomen is soft.      Tenderness: There is no abdominal tenderness.   Genitourinary:     General: Normal vulva.      Vagina: No vaginal discharge, tenderness or bleeding.      Cervix: No cervical motion tenderness or friability.      Uterus: Not enlarged and not tender.       Adnexa:         Right: No mass, tenderness or fullness.          Left: No mass, tenderness or fullness.        Comments: Pediatric speculum used  Musculoskeletal:         General: Normal range of motion.      Cervical back: Normal range of motion.   Neurological:      Mental Status: She is alert.   Psychiatric:         Mood and Affect: Mood normal.         Thought Content: Thought content normal.         ASSESSMENT:       ICD-10-CM ICD-9-CM    1. Well woman exam with routine gynecological exam  Z01.419 V72.31 DXA Bone Density Appendicular Skeleton      2. Asymptomatic menopausal state  Z78.0 V49.81 DXA Bone Density Appendicular Skeleton      3. Urge urinary incontinence  N39.41 788.31 Ambulatory Referral/Consult to Physical Therapy             Plan:      Aileen was seen today for well woman.    Diagnoses and all orders for this visit:    Well woman exam with routine gynecological exam  -     DXA Bone Density Appendicular Skeleton; Future    Asymptomatic menopausal state  -     DXA Bone Density Appendicular Skeleton; Future    Urge urinary incontinence  -    Discussed behavioral modifications including tobacco cessation and cutting back on cranberry juice  -    She is interested in working with pelvic floor PT   -    Ambulatory Referral/Consult to Physical Therapy; Future        Orders Placed This Encounter   Procedures    DXA Bone Density Appendicular Skeleton    Ambulatory Referral/Consult to Physical Therapy       Well Woman:   - Pap smear: due 2026  - Mammogram: up to date  - Colonoscopy: up to date  - Dexa:  ordered  - Immunizations: per PCP  - Labs: reviewed  - Exercise recommended  - Tobacco Cessation counseling provided    Follow up in one year for annual, or prn.    Johnathan Masters         [1]   Social History  Tobacco Use    Smoking status: Every Day     Current packs/day: 1.00     Average packs/day: 1.2 packs/day for 41.6 years (48.6 ttl pk-yrs)     Types: Cigarettes, Vaping with nicotine     Start date: 1984    Smokeless tobacco: Never   Substance Use Topics    Alcohol use: Yes     Comment: socially    Drug use: Never

## 2025-07-30 ENCOUNTER — TELEPHONE (OUTPATIENT)
Dept: SMOKING CESSATION | Facility: CLINIC | Age: 65
End: 2025-07-30
Payer: MEDICARE

## 2025-07-30 NOTE — TELEPHONE ENCOUNTER
2nd attempt to contact patient about 12 month follow up. Left message to call KELLEY Brewster @ [phone].

## 2025-08-04 ENCOUNTER — HOSPITAL ENCOUNTER (OUTPATIENT)
Dept: RADIOLOGY | Facility: OTHER | Age: 65
Discharge: HOME | End: 2025-08-04
Attending: STUDENT IN AN ORGANIZED HEALTH CARE EDUCATION/TRAINING PROGRAM
Payer: MEDICARE

## 2025-08-04 DIAGNOSIS — Z78.0 ASYMPTOMATIC MENOPAUSAL STATE: ICD-10-CM

## 2025-08-04 DIAGNOSIS — Z01.419 WELL WOMAN EXAM WITH ROUTINE GYNECOLOGICAL EXAM: ICD-10-CM

## 2025-08-04 PROCEDURE — 77081 DXA BONE DENSITY APPENDICULR: CPT | Mod: 26,,, | Performed by: RADIOLOGY

## 2025-08-04 PROCEDURE — 77081 DXA BONE DENSITY APPENDICULR: CPT | Mod: TC

## (undated) DEVICE — SUT ETHILON 4-0 PS2 18 BLK

## (undated) DEVICE — SPLINT REUTER BIVALVE 0.5MM

## (undated) DEVICE — SUT 4-0 CHROMIC GUT / SH

## (undated) DEVICE — Device

## (undated) DEVICE — SYR 10CC LUER LOCK

## (undated) DEVICE — DRAPE EENT SPLIT STERILE

## (undated) DEVICE — ELECTRODE REM PLYHSV RETURN 9

## (undated) DEVICE — TOWEL OR DISP STRL BLUE 4/PK

## (undated) DEVICE — SPONGE PATTY SURGICAL .5X3IN

## (undated) DEVICE — CATH IV INTROCAN 14G X 2.

## (undated) DEVICE — BLADE INFERIOR TURBINATE 5/PK

## (undated) DEVICE — TRAY ENT 4/CS

## (undated) DEVICE — GOWN NONREINF SET-IN SLV 2XL